# Patient Record
Sex: FEMALE | Race: OTHER | HISPANIC OR LATINO | URBAN - METROPOLITAN AREA
[De-identification: names, ages, dates, MRNs, and addresses within clinical notes are randomized per-mention and may not be internally consistent; named-entity substitution may affect disease eponyms.]

---

## 2018-07-06 ENCOUNTER — INPATIENT (INPATIENT)
Facility: HOSPITAL | Age: 73
LOS: 9 days | Discharge: HOME HEALTH | DRG: 233 | End: 2018-07-16
Attending: THORACIC SURGERY (CARDIOTHORACIC VASCULAR SURGERY) | Admitting: HOSPITALIST
Payer: MEDICAID

## 2018-07-06 VITALS — WEIGHT: 149.91 LBS

## 2018-07-06 DIAGNOSIS — I20.0 UNSTABLE ANGINA: ICD-10-CM

## 2018-07-06 LAB
ALBUMIN SERPL ELPH-MCNC: 4 G/DL — SIGNIFICANT CHANGE UP (ref 3.3–5.2)
ALP SERPL-CCNC: 107 U/L — SIGNIFICANT CHANGE UP (ref 40–120)
ALT FLD-CCNC: 7 U/L — SIGNIFICANT CHANGE UP
ANION GAP SERPL CALC-SCNC: 16 MMOL/L — SIGNIFICANT CHANGE UP (ref 5–17)
ANION GAP SERPL CALC-SCNC: 18 MMOL/L — HIGH (ref 5–17)
ANION GAP SERPL CALC-SCNC: 19 MMOL/L — HIGH (ref 5–17)
APPEARANCE UR: CLEAR — SIGNIFICANT CHANGE UP
APTT BLD: 35 SEC — SIGNIFICANT CHANGE UP (ref 27.5–37.4)
AST SERPL-CCNC: 16 U/L — SIGNIFICANT CHANGE UP
BACTERIA # UR AUTO: ABNORMAL
BASOPHILS # BLD AUTO: 0.1 K/UL — SIGNIFICANT CHANGE UP (ref 0–0.2)
BASOPHILS # BLD AUTO: 0.1 K/UL — SIGNIFICANT CHANGE UP (ref 0–0.2)
BASOPHILS NFR BLD AUTO: 0.5 % — SIGNIFICANT CHANGE UP (ref 0–2)
BASOPHILS NFR BLD AUTO: 0.6 % — SIGNIFICANT CHANGE UP (ref 0–2)
BILIRUB SERPL-MCNC: 0.5 MG/DL — SIGNIFICANT CHANGE UP (ref 0.4–2)
BILIRUB UR-MCNC: NEGATIVE — SIGNIFICANT CHANGE UP
BUN SERPL-MCNC: 10 MG/DL — SIGNIFICANT CHANGE UP (ref 8–20)
BUN SERPL-MCNC: 12 MG/DL — SIGNIFICANT CHANGE UP (ref 8–20)
BUN SERPL-MCNC: 9 MG/DL — SIGNIFICANT CHANGE UP (ref 8–20)
CALCIUM SERPL-MCNC: 9 MG/DL — SIGNIFICANT CHANGE UP (ref 8.6–10.2)
CALCIUM SERPL-MCNC: 9.3 MG/DL — SIGNIFICANT CHANGE UP (ref 8.6–10.2)
CALCIUM SERPL-MCNC: 9.4 MG/DL — SIGNIFICANT CHANGE UP (ref 8.6–10.2)
CHLORIDE SERPL-SCNC: 97 MMOL/L — LOW (ref 98–107)
CHLORIDE SERPL-SCNC: 97 MMOL/L — LOW (ref 98–107)
CHLORIDE SERPL-SCNC: 98 MMOL/L — SIGNIFICANT CHANGE UP (ref 98–107)
CHOLEST SERPL-MCNC: 244 MG/DL — HIGH (ref 110–199)
CO2 SERPL-SCNC: 24 MMOL/L — SIGNIFICANT CHANGE UP (ref 22–29)
CO2 SERPL-SCNC: 25 MMOL/L — SIGNIFICANT CHANGE UP (ref 22–29)
CO2 SERPL-SCNC: 27 MMOL/L — SIGNIFICANT CHANGE UP (ref 22–29)
COLOR SPEC: YELLOW — SIGNIFICANT CHANGE UP
CREAT SERPL-MCNC: 0.4 MG/DL — LOW (ref 0.5–1.3)
CREAT SERPL-MCNC: 0.49 MG/DL — LOW (ref 0.5–1.3)
CREAT SERPL-MCNC: 0.52 MG/DL — SIGNIFICANT CHANGE UP (ref 0.5–1.3)
DIFF PNL FLD: NEGATIVE — SIGNIFICANT CHANGE UP
EOSINOPHIL # BLD AUTO: 0.3 K/UL — SIGNIFICANT CHANGE UP (ref 0–0.5)
EOSINOPHIL # BLD AUTO: 0.4 K/UL — SIGNIFICANT CHANGE UP (ref 0–0.5)
EOSINOPHIL NFR BLD AUTO: 2.7 % — SIGNIFICANT CHANGE UP (ref 0–6)
EOSINOPHIL NFR BLD AUTO: 3 % — SIGNIFICANT CHANGE UP (ref 0–6)
EPI CELLS # UR: SIGNIFICANT CHANGE UP
GLUCOSE SERPL-MCNC: 107 MG/DL — SIGNIFICANT CHANGE UP (ref 70–115)
GLUCOSE SERPL-MCNC: 96 MG/DL — SIGNIFICANT CHANGE UP (ref 70–115)
GLUCOSE SERPL-MCNC: 99 MG/DL — SIGNIFICANT CHANGE UP (ref 70–115)
GLUCOSE UR QL: NEGATIVE MG/DL — SIGNIFICANT CHANGE UP
HBA1C BLD-MCNC: 4.8 % — SIGNIFICANT CHANGE UP (ref 4–5.6)
HCT VFR BLD CALC: 39.7 % — SIGNIFICANT CHANGE UP (ref 37–47)
HCT VFR BLD CALC: 40 % — SIGNIFICANT CHANGE UP (ref 37–47)
HCT VFR BLD CALC: 41.1 % — SIGNIFICANT CHANGE UP (ref 37–47)
HDLC SERPL-MCNC: 58 MG/DL — LOW
HGB BLD-MCNC: 13.2 G/DL — SIGNIFICANT CHANGE UP (ref 12–16)
HGB BLD-MCNC: 13.4 G/DL — SIGNIFICANT CHANGE UP (ref 12–16)
HGB BLD-MCNC: 13.6 G/DL — SIGNIFICANT CHANGE UP (ref 12–16)
INR BLD: 1.01 RATIO — SIGNIFICANT CHANGE UP (ref 0.88–1.16)
KETONES UR-MCNC: NEGATIVE — SIGNIFICANT CHANGE UP
LACTATE BLDV-MCNC: 1.4 MMOL/L — SIGNIFICANT CHANGE UP (ref 0.5–2)
LEUKOCYTE ESTERASE UR-ACNC: NEGATIVE — SIGNIFICANT CHANGE UP
LIDOCAIN IGE QN: 26 U/L — SIGNIFICANT CHANGE UP (ref 22–51)
LIPID PNL WITH DIRECT LDL SERPL: 157 MG/DL — SIGNIFICANT CHANGE UP
LYMPHOCYTES # BLD AUTO: 2.6 K/UL — SIGNIFICANT CHANGE UP (ref 1–4.8)
LYMPHOCYTES # BLD AUTO: 23.1 % — SIGNIFICANT CHANGE UP (ref 20–55)
LYMPHOCYTES # BLD AUTO: 3.4 K/UL — SIGNIFICANT CHANGE UP (ref 1–4.8)
LYMPHOCYTES # BLD AUTO: 30 % — SIGNIFICANT CHANGE UP (ref 20–55)
MAGNESIUM SERPL-MCNC: 2.2 MG/DL — SIGNIFICANT CHANGE UP (ref 1.6–2.6)
MAGNESIUM SERPL-MCNC: 2.3 MG/DL — SIGNIFICANT CHANGE UP (ref 1.8–2.6)
MCHC RBC-ENTMCNC: 30.2 PG — SIGNIFICANT CHANGE UP (ref 27–31)
MCHC RBC-ENTMCNC: 30.4 PG — SIGNIFICANT CHANGE UP (ref 27–31)
MCHC RBC-ENTMCNC: 30.9 PG — SIGNIFICANT CHANGE UP (ref 27–31)
MCHC RBC-ENTMCNC: 33.1 G/DL — SIGNIFICANT CHANGE UP (ref 32–36)
MCHC RBC-ENTMCNC: 33.2 G/DL — SIGNIFICANT CHANGE UP (ref 32–36)
MCHC RBC-ENTMCNC: 33.5 G/DL — SIGNIFICANT CHANGE UP (ref 32–36)
MCV RBC AUTO: 91.3 FL — SIGNIFICANT CHANGE UP (ref 81–99)
MCV RBC AUTO: 91.5 FL — SIGNIFICANT CHANGE UP (ref 81–99)
MCV RBC AUTO: 92.2 FL — SIGNIFICANT CHANGE UP (ref 81–99)
MONOCYTES # BLD AUTO: 0.6 K/UL — SIGNIFICANT CHANGE UP (ref 0–0.8)
MONOCYTES # BLD AUTO: 0.8 K/UL — SIGNIFICANT CHANGE UP (ref 0–0.8)
MONOCYTES NFR BLD AUTO: 5.4 % — SIGNIFICANT CHANGE UP (ref 3–10)
MONOCYTES NFR BLD AUTO: 7.3 % — SIGNIFICANT CHANGE UP (ref 3–10)
NEUTROPHILS # BLD AUTO: 7 K/UL — SIGNIFICANT CHANGE UP (ref 1.8–8)
NEUTROPHILS # BLD AUTO: 7.4 K/UL — SIGNIFICANT CHANGE UP (ref 1.8–8)
NEUTROPHILS NFR BLD AUTO: 60.6 % — SIGNIFICANT CHANGE UP (ref 37–73)
NEUTROPHILS NFR BLD AUTO: 66 % — SIGNIFICANT CHANGE UP (ref 37–73)
NITRITE UR-MCNC: NEGATIVE — SIGNIFICANT CHANGE UP
NT-PROBNP SERPL-SCNC: 175 PG/ML — SIGNIFICANT CHANGE UP (ref 0–300)
PH UR: 7 — SIGNIFICANT CHANGE UP (ref 5–8)
PLATELET # BLD AUTO: 261 K/UL — SIGNIFICANT CHANGE UP (ref 150–400)
PLATELET # BLD AUTO: 272 K/UL — SIGNIFICANT CHANGE UP (ref 150–400)
PLATELET # BLD AUTO: 277 K/UL — SIGNIFICANT CHANGE UP (ref 150–400)
POTASSIUM SERPL-MCNC: 3.2 MMOL/L — LOW (ref 3.5–5.3)
POTASSIUM SERPL-MCNC: 3.5 MMOL/L — SIGNIFICANT CHANGE UP (ref 3.5–5.3)
POTASSIUM SERPL-MCNC: 3.7 MMOL/L — SIGNIFICANT CHANGE UP (ref 3.5–5.3)
POTASSIUM SERPL-SCNC: 3.2 MMOL/L — LOW (ref 3.5–5.3)
POTASSIUM SERPL-SCNC: 3.5 MMOL/L — SIGNIFICANT CHANGE UP (ref 3.5–5.3)
POTASSIUM SERPL-SCNC: 3.7 MMOL/L — SIGNIFICANT CHANGE UP (ref 3.5–5.3)
PROT SERPL-MCNC: 7.5 G/DL — SIGNIFICANT CHANGE UP (ref 6.6–8.7)
PROT UR-MCNC: 15 MG/DL
PROTHROM AB SERPL-ACNC: 11.1 SEC — SIGNIFICANT CHANGE UP (ref 9.8–12.7)
RBC # BLD: 4.34 M/UL — LOW (ref 4.4–5.2)
RBC # BLD: 4.34 M/UL — LOW (ref 4.4–5.2)
RBC # BLD: 4.5 M/UL — SIGNIFICANT CHANGE UP (ref 4.4–5.2)
RBC # FLD: 12.3 % — SIGNIFICANT CHANGE UP (ref 11–15.6)
RBC # FLD: 12.4 % — SIGNIFICANT CHANGE UP (ref 11–15.6)
RBC # FLD: 12.5 % — SIGNIFICANT CHANGE UP (ref 11–15.6)
RBC CASTS # UR COMP ASSIST: SIGNIFICANT CHANGE UP /HPF (ref 0–4)
SODIUM SERPL-SCNC: 139 MMOL/L — SIGNIFICANT CHANGE UP (ref 135–145)
SODIUM SERPL-SCNC: 141 MMOL/L — SIGNIFICANT CHANGE UP (ref 135–145)
SODIUM SERPL-SCNC: 141 MMOL/L — SIGNIFICANT CHANGE UP (ref 135–145)
SP GR SPEC: 1.01 — SIGNIFICANT CHANGE UP (ref 1.01–1.02)
TOTAL CHOLESTEROL/HDL RATIO MEASUREMENT: 4 RATIO — SIGNIFICANT CHANGE UP (ref 3.3–7.1)
TRIGL SERPL-MCNC: 145 MG/DL — SIGNIFICANT CHANGE UP (ref 10–200)
TROPONIN T SERPL-MCNC: 0.07 NG/ML — HIGH (ref 0–0.06)
TROPONIN T SERPL-MCNC: 0.23 NG/ML — HIGH (ref 0–0.06)
TROPONIN T SERPL-MCNC: 0.34 NG/ML — HIGH (ref 0–0.06)
TSH SERPL-MCNC: 2.36 UIU/ML — SIGNIFICANT CHANGE UP (ref 0.27–4.2)
UROBILINOGEN FLD QL: NEGATIVE MG/DL — SIGNIFICANT CHANGE UP
WBC # BLD: 10 K/UL — SIGNIFICANT CHANGE UP (ref 4.8–10.8)
WBC # BLD: 11.2 K/UL — HIGH (ref 4.8–10.8)
WBC # BLD: 11.5 K/UL — HIGH (ref 4.8–10.8)
WBC # FLD AUTO: 10 K/UL — SIGNIFICANT CHANGE UP (ref 4.8–10.8)
WBC # FLD AUTO: 11.2 K/UL — HIGH (ref 4.8–10.8)
WBC # FLD AUTO: 11.5 K/UL — HIGH (ref 4.8–10.8)
WBC UR QL: SIGNIFICANT CHANGE UP

## 2018-07-06 PROCEDURE — 71260 CT THORAX DX C+: CPT | Mod: 26

## 2018-07-06 PROCEDURE — 93010 ELECTROCARDIOGRAM REPORT: CPT

## 2018-07-06 PROCEDURE — 99223 1ST HOSP IP/OBS HIGH 75: CPT

## 2018-07-06 PROCEDURE — 93458 L HRT ARTERY/VENTRICLE ANGIO: CPT | Mod: 26

## 2018-07-06 PROCEDURE — 71046 X-RAY EXAM CHEST 2 VIEWS: CPT | Mod: 26

## 2018-07-06 PROCEDURE — 99152 MOD SED SAME PHYS/QHP 5/>YRS: CPT

## 2018-07-06 PROCEDURE — 74177 CT ABD & PELVIS W/CONTRAST: CPT | Mod: 26

## 2018-07-06 PROCEDURE — 99283 EMERGENCY DEPT VISIT LOW MDM: CPT | Mod: 25

## 2018-07-06 RX ORDER — MORPHINE SULFATE 50 MG/1
2 CAPSULE, EXTENDED RELEASE ORAL EVERY 4 HOURS
Qty: 0 | Refills: 0 | Status: DISCONTINUED | OUTPATIENT
Start: 2018-07-06 | End: 2018-07-09

## 2018-07-06 RX ORDER — HYDRALAZINE HCL 50 MG
5 TABLET ORAL EVERY 6 HOURS
Qty: 0 | Refills: 0 | Status: DISCONTINUED | OUTPATIENT
Start: 2018-07-06 | End: 2018-07-11

## 2018-07-06 RX ORDER — PANTOPRAZOLE SODIUM 20 MG/1
40 TABLET, DELAYED RELEASE ORAL ONCE
Qty: 0 | Refills: 0 | Status: COMPLETED | OUTPATIENT
Start: 2018-07-06 | End: 2018-07-06

## 2018-07-06 RX ORDER — ENOXAPARIN SODIUM 100 MG/ML
40 INJECTION SUBCUTANEOUS DAILY
Qty: 0 | Refills: 0 | Status: DISCONTINUED | OUTPATIENT
Start: 2018-07-06 | End: 2018-07-10

## 2018-07-06 RX ORDER — AMLODIPINE BESYLATE 2.5 MG/1
5 TABLET ORAL DAILY
Qty: 0 | Refills: 0 | Status: DISCONTINUED | OUTPATIENT
Start: 2018-07-06 | End: 2018-07-11

## 2018-07-06 RX ORDER — ASPIRIN/CALCIUM CARB/MAGNESIUM 324 MG
325 TABLET ORAL DAILY
Qty: 0 | Refills: 0 | Status: DISCONTINUED | OUTPATIENT
Start: 2018-07-07 | End: 2018-07-10

## 2018-07-06 RX ORDER — METOCLOPRAMIDE HCL 10 MG
10 TABLET ORAL ONCE
Qty: 0 | Refills: 0 | Status: COMPLETED | OUTPATIENT
Start: 2018-07-06 | End: 2018-07-06

## 2018-07-06 RX ORDER — ASPIRIN/CALCIUM CARB/MAGNESIUM 324 MG
325 TABLET ORAL ONCE
Qty: 0 | Refills: 0 | Status: COMPLETED | OUTPATIENT
Start: 2018-07-06 | End: 2018-07-06

## 2018-07-06 RX ORDER — ACETAMINOPHEN 500 MG
650 TABLET ORAL ONCE
Qty: 0 | Refills: 0 | Status: COMPLETED | OUTPATIENT
Start: 2018-07-06 | End: 2018-07-06

## 2018-07-06 RX ORDER — IPRATROPIUM/ALBUTEROL SULFATE 18-103MCG
3 AEROSOL WITH ADAPTER (GRAM) INHALATION EVERY 6 HOURS
Qty: 0 | Refills: 0 | Status: DISCONTINUED | OUTPATIENT
Start: 2018-07-06 | End: 2018-07-08

## 2018-07-06 RX ORDER — NICOTINE POLACRILEX 2 MG
1 GUM BUCCAL DAILY
Qty: 0 | Refills: 0 | Status: DISCONTINUED | OUTPATIENT
Start: 2018-07-06 | End: 2018-07-11

## 2018-07-06 RX ORDER — NITROGLYCERIN 6.5 MG
1 CAPSULE, EXTENDED RELEASE ORAL DAILY
Qty: 0 | Refills: 0 | Status: DISCONTINUED | OUTPATIENT
Start: 2018-07-06 | End: 2018-07-11

## 2018-07-06 RX ADMIN — Medication 325 MILLIGRAM(S): at 08:10

## 2018-07-06 RX ADMIN — Medication 650 MILLIGRAM(S): at 18:46

## 2018-07-06 RX ADMIN — Medication 104 MILLIGRAM(S): at 05:12

## 2018-07-06 RX ADMIN — PANTOPRAZOLE SODIUM 40 MILLIGRAM(S): 20 TABLET, DELAYED RELEASE ORAL at 05:12

## 2018-07-06 RX ADMIN — Medication 650 MILLIGRAM(S): at 18:31

## 2018-07-06 RX ADMIN — Medication 3 MILLILITER(S): at 14:31

## 2018-07-06 RX ADMIN — Medication 1 PATCH: at 11:20

## 2018-07-06 NOTE — PROGRESS NOTE ADULT - SUBJECTIVE AND OBJECTIVE BOX
Nurse Practitioner Progress note:     INTERVAL HISTORY: 73 year old female with c/o chest pain.     MEDICATIONS:  amLODIPine   Tablet 5 milliGRAM(s) Oral daily  hydrALAZINE Injectable 5 milliGRAM(s) IV Push every 6 hours PRN  nitroglycerin    Patch 0.1 mG/Hr(s) 1 patch Transdermal daily  ALBUTerol/ipratropium for Nebulization 3 milliLiter(s) Nebulizer every 6 hours  LORazepam   Injectable 0.25 milliGRAM(s) IV Push every 8 hours PRN  morphine  - Injectable 2 milliGRAM(s) IV Push every 4 hours PRN  enoxaparin Injectable 40 milliGRAM(s) SubCutaneous daily      TELEMETRY:  SB 54 bpm    T(C): 36.6 (07-06-18 @ 16:30), Max: 37 (07-06-18 @ 01:09)  HR: 56 (07-06-18 @ 18:03) (55 - 67)  BP: 163/77 (07-06-18 @ 18:03) (141/72 - 163/77)  RR: 14 (07-06-18 @ 18:03) (14 - 20)  SpO2: 93% (07-06-18 @ 18:03) (92% - 99%)  Wt(kg): --    PHYSICAL EXAM:  Appearance: Normal	  HEENT:   Normal oral mucosa, PERRL  Cardiovascular: Normal S1 S2, No JVD, No murmurs, No edema  Respiratory: Lungs with b/l wheezilng  Psychiatry: A & O x 3, Mood & affect appropriate.  Croatian speaking  Gastrointestinal:  Soft, Non-tender, + BS	  Skin: No rashes, No ecchymoses, No cyanosis  Neurologic: Non-focal, A&O X3.  No neuro deficits  Extremities: Normal range of motion, No clubbing, cyanosis or edema  Vascular: Peripheral pulses palpable 2+ bilaterally  Procedure Site: Right radial band in place.  Site benign.  No bleeding/hematoma/ecchymosis.  + palp radial.    	      PROCEDURE RESULTS: S/P LHC which revealed: mLAD=95%, OM=60%, pRCA 100% and normal LV (full report to follow) via right radial.  No intervention.   For CTS consult    ASSESSMENT/PLAN: 	  -Radial precautions  -D/C radial band in 1 hour  -CTS consult pending  -Site check in AM  -Please call with questions/concerns  -Transfer back to telemetry

## 2018-07-06 NOTE — ED ADULT NURSE NOTE - OBJECTIVE STATEMENT
Pt received sitting on stretcher in NAD. Pt AOx3 C/o chest pain x 3 days. pt started coughing tonight. Neuro WNL. PERRLA. Lungs with BL Wheeze, RR even unlabored. Ab soft non tender, + bowel sounds x 4quads. Denies Nausea, Vomiting, Diarrhea. Skin warm, dry, color appropriate for age and race.

## 2018-07-06 NOTE — ED ADULT NURSE REASSESSMENT NOTE - NS ED NURSE REASSESS COMMENT FT1
Pt transported to Ct scan
Report given to Marybel RN and patient transported to A9. No distress present. Safety maintained at all times.
Pt received sleeping in stretcher with family at bedside, easily arousable to verbal and tactile stimluli. Alert and oriented x4 with CM in place showing NSR. No distress present and no complaints offered. Plan of care reviewed and safety maintained at all times. Deniess dizziness, CP, or SOB. Denies nausea or vomiting. RN to continue to monitor and reassess closely.

## 2018-07-06 NOTE — H&P ADULT - HISTORY OF PRESENT ILLNESS
73 yof with pmh of active smoker, htn and possible cad presents from home with 2 day history of mid sternal chest pain which is rated 6 out of 10 with radiation to left sided neck and is intermittent. Patients family was at bedside who provided much of the history. Patient states she was told she may have had a silent heart attack in the past. Patient also states the pain is reproducible. Patient dneies nausea, diaphoresis. Admission trop is 0.07.

## 2018-07-06 NOTE — ED ADULT NURSE NOTE - NSSISCREENINGQ3_ED_A_ED
Is This A New Presentation, Or A Follow-Up?: Skin Lesion
How Severe Is Your Skin Lesion?: moderate
Has Your Skin Lesion Been Treated?: not been treated
No

## 2018-07-06 NOTE — PROGRESS NOTE ADULT - SUBJECTIVE AND OBJECTIVE BOX
Cardiology NP    ASA 2  Mallampati 2    73 year old female with prior cardiac history "pre heart attacks" with c/o chest pain.  + trops. + smoker.  + wheezing.  For LHC to assess coronary arteries.

## 2018-07-06 NOTE — ED PROVIDER NOTE - PHYSICAL EXAMINATION
Constitutional : Appears uncomfortable, talking in full sentences  Head :NC AT , no swelling  Eyes :eomi, no swelling  Mouth :mm dry,   Neck : supple, trachea in midline  Chest : Diffuse wheezing. Edilberto air entry, symm chest expansion, no distress  Heart :S1 S2 distant  Abdomen :abd soft, LUQ tenderness with localized guarding   Musc/Skel :ext no swelling, no deformity, no spine tenderness, distal pulses present  Neuro  :AAO 3 no focal deficits

## 2018-07-06 NOTE — H&P ADULT - NSHPPHYSICALEXAM_GEN_ALL_CORE
PHYSICAL EXAM:    GENERAL: NAD, well-groomed, well-developed  HEAD:  Atraumatic, Normocephalic  EYES: EOMI, PERRLA,   ENMT: No tonsillar erythema, exudates, or enlargement;   NECK: Supple, No JVD, Normal thyroid  NERVOUS SYSTEM:  Alert & Oriented X3, Good concentration;   CHEST/LUNG: Clear to percussion bilaterally; No rales,  HEART: Regular rate and rhythm; No murmurs,  ABDOMEN: Soft, Nontender, Nondistended;   EXTREMITIES:  2+ Peripheral Pulses, No edema  LYMPH: No lymphadenopathy noted  SKIN: No rashes or lesions PHYSICAL EXAM:    GENERAL: NAD, speaking in full sentences  HEAD:  Atraumatic, Normocephalic  EYES: EOMI, PERRLA,   ENMT: No tonsillar erythema, exudates, or enlargement;   NECK: Supple, No JVD, Normal thyroid  NERVOUS SYSTEM:  Alert & Oriented X3, Good concentration;   CHEST/LUNG: tender on palpation   HEART: Regular rate and rhythm; No murmurs,  ABDOMEN: Soft, Nontender, Nondistended;   EXTREMITIES:  2+ Peripheral Pulses, No edema  LYMPH: No lymphadenopathy noted  SKIN: No rashes or lesions

## 2018-07-06 NOTE — H&P ADULT - ASSESSMENT
1) Atypical chest pain --> cardio consult appreciated  --> admit to tele  --> tte, stress test  --> hemoglobin a1c and lipid panel sent  --> nitro paste. morphine prn     2) NSTEMI --> plan as per #1  ---> trending upward  --> asa given  --> will start heparin drip for now     3) smoker --> patch    4) htn --> hold oral meds for now  --> iv hydrlaazine prn     5) dvt prop --> lovenox sub q    6) diet --> npo  --> dash diet after stress 1) Atypical chest pain --> cardio consult appreciated  --> admit to tele  --> tte, stress test  --> hemoglobin a1c and lipid panel sent  --> nitro paste. morphine prn     2) NSTEMI --> plan as per #1  ---> trending upward  --> asa given  --> plan as per cardio     3) smoker --> patch    4) htn --> hold oral meds for now  --> iv hydrlaazine prn     5) dvt prop --> lovenox sub q    6) diet --> npo  --> dash diet after stress

## 2018-07-06 NOTE — CONSULT NOTE ADULT - SUBJECTIVE AND OBJECTIVE BOX
HPI:  Asked to see patient in ER  who presented with Chest pain. Her Granddaughters were present - they translated and provided the History. Patient is not a good Historian.  She has had 3 days of chest pain - constant - below the left breast - sharp in nature - reproducible to pressure/touch- not worsen with exertion - without radiation and not associated SOB or palpitations. Does not change with position  Denies DM, prior MI  h/o HTN  Seen by cardiologist in [facility] - told that she has enlarge heart.  She is a smoker since age 17  + FH x CAD ?    In ER: vss. EKG: nonspecific ST-T abn.     First Trop - negative.       PAST MEDICAL & SURGICAL HISTORY:  HTN (hypertension)      PREVIOUS DIAGNOSTIC TESTING:      ECHO  FINDINGS:    STRESS  FINDINGS:    CATHETERIZATION  FINDINGS:      Allergies    latex (Unknown)  No Known Drug Allergies    Intolerances        MEDICATIONS  (STANDING):  ALBUTerol/ipratropium for Nebulization 3 milliLiter(s) Nebulizer every 6 hours  nicotine - 21 mG/24Hr(s) Patch 1 patch Transdermal daily  nitroglycerin    Patch 0.1 mG/Hr(s) 1 patch Transdermal daily    MEDICATIONS  (PRN):  hydrALAZINE Injectable 5 milliGRAM(s) IV Push every 6 hours PRN for sbp above 160 mmhg  LORazepam   Injectable 0.25 milliGRAM(s) IV Push every 8 hours PRN Agitation  morphine  - Injectable 2 milliGRAM(s) IV Push every 4 hours PRN chest pain      FAMILY HISTORY:      SOCIAL HISTORY:    CIGARETTES:  none    ALCOHOL:  none    REVIEW OF SYSTEMS:  CONSTITUTIONAL: No fever, weight loss  EYES: Novisual disturbances  ENMT:  No sinus or throat pain  NECK: No pain or stiffness  RESPIRATORY: occasional cough cough, wheezing;  no chills or hemoptysis; occasional  Shortness of Breath  CARDIOVASCULAR: No  passing out, dizziness, or leg swelling  GASTROINTESTINAL: No abdominal or epigastric pain. No nausea, vomiting, or hematemesis;  NEUROLOGICAL: No headaches  SKIN: No rashes lesions   MUSCULOSKELETAL:joint pain   PSYCHIATRIC: No depression  HEME/LYMPH: No easy bruising  Vital Signs Last 24 Hrs  T(C): 37 (2018 01:09), Max: 37 (2018 01:09)  T(F): 98.6 (2018 01:09), Max: 98.6 (2018 01:09)  HR: 56 (2018 07:38) (56 - 67)  BP: 153/70 (2018 07:38) (153/70 - 153/84)  BP(mean): 100 (2018 07:38) (100 - 100)  RR: 20 (2018 07:38) (20 - 20)  SpO2: 96% (2018 07:38) (94% - 96%)    Daily     Daily     I&O's Detail      PHYSICAL EXAM:  Appearance: Normal  HEENT:   Normal oral mucosa, PERRL, EOMI, sclera non-icteric	  Lymphatic: No cervical lymphadenopathy  Cardiovascular: Normal S1 S2, No JVD, No cardiac murmurs, No carotid bruits, No peripheral edema  Respiratory: Lungs - scattered wheezing. No rales.   Psychiatry: A & O x 3  Gastrointestinal:  Soft, Non-tender, + BS  Skin: No rashes, No ecchymoses, No cyanosis  Neurologic: Grossly non-focal   Extremities: No clubbing, cyanosis or edema  Vascular: Peripheral pulses palpable 1+ bilaterally      INTERPRETATION OF TELEMETRY:    ECG: SR, nonspecific ST- T changes.     LABS:                        13.4   10.0  )-----------( 272      ( 2018 09:19 )             40.0     07-06    141  |  98  |  9.0  ----------------------------<  96  3.7   |  27.0  |  0.49<L>    Ca    9.4      2018 09:19  Mg     2.3     07-06    TPro  7.5  /  Alb  4.0  /  TBili  0.5  /  DBili  x   /  AST  16  /  ALT  7   /  AlkPhos  107  07-06      CARDIAC MARKERS ( 2018 09:19 )  CKx     / CKMBx     / Troponin T0.23 ng/mL /  CARDIAC MARKERS ( 2018 05:10 )  CKx     / CKMBx     / Troponin T0.07 ng/mL /      PT/INR - ( 2018 05:10 )   PT: 11.1 sec;   INR: 1.01 ratio         PTT - ( 2018 05:10 )  PTT:35.0 sec  Urinalysis Basic - ( 2018 05:10 )    Color: Yellow / Appearance: Clear / S.010 / pH: x  Gluc: x / Ketone: Negative  / Bili: Negative / Urobili: Negative mg/dL   Blood: x / Protein: 15 mg/dL / Nitrite: Negative   Leuk Esterase: Negative / RBC: 0-2 /HPF / WBC 0-2   Sq Epi: x / Non Sq Epi: Occasional / Bacteria: Occasional      I&O's Summary    BNPSerum Pro-Brain Natriuretic Peptide: 175 pg/mL ( @ 05:10)    RADIOLOGY & ADDITIONAL STUDIES:

## 2018-07-06 NOTE — ED PROVIDER NOTE - OBJECTIVE STATEMENT
72 y/o female smoker with PMHx HTN presents to the ED for evaluation of LUQ abdominal pain radiating towards chest

## 2018-07-06 NOTE — H&P ADULT - NSHPLABSRESULTS_GEN_ALL_CORE
13.4   10.0   )----------(  272       ( 2018 09:19 )               40.0      141    |  98     |  9.0    ----------------------------<  96         ( 2018 09:19 )  3.7     |  27.0   |  0.49     Ca    9.4        ( 2018 09:19 )  Mg     2.3       ( 2018 09:19 )    TPro  7.5    /  Alb  4.0    /  TBili  0.5    /  DBili  x      /  AST  16     /  ALT  7      /  AlkPhos  107    ( 2018 05:10 )    LIVER FUNCTIONS - ( 2018 05:10 )  Alb: 4.0 g/dL / Pro: 7.5 g/dL / ALK PHOS: 107 U/L / ALT: 7 U/L / AST: 16 U/L / GGT: x           PT/INR -  11.1 sec / 1.01 ratio   ( 2018 05:10 )       PTT -  35.0 sec   ( 2018 05:10 )  CAPILLARY BLOOD GLUCOSE    CARDIAC MARKERS ( 2018 09:19 )  x     / 0.23 ng/mL / x     / x     / x      CARDIAC MARKERS ( 2018 05:10 )  x     / 0.07 ng/mL / x     / x     / x          Urinalysis Basic - ( 2018 05:10 )    Color: Yellow / Appearance: Clear / S.010 / pH: x  Gluc: x / Ketone: Negative  / Bili: Negative / Urobili: Negative mg/dL   Blood: x / Protein: 15 mg/dL / Nitrite: Negative   Leuk Esterase: Negative / RBC: 0-2 /HPF / WBC 0-2   Sq Epi: x / Non Sq Epi: Occasional / Bacteria: Occasional      tte - ordered    stress test --> ordered

## 2018-07-06 NOTE — CONSULT NOTE ADULT - ASSESSMENT
The chest pain appears atypical in nature - constant, sharp, reproducible with pressure/touch  Recommend echo to assess LV function and stress test to further risk stratify. The chest pain appears atypical in nature - constant, sharp, reproducible with pressure/touch  Recommend echo to assess LV function and stress test or CTA to further risk stratify.

## 2018-07-07 DIAGNOSIS — I21.4 NON-ST ELEVATION (NSTEMI) MYOCARDIAL INFARCTION: ICD-10-CM

## 2018-07-07 DIAGNOSIS — I25.110 ATHEROSCLEROTIC HEART DISEASE OF NATIVE CORONARY ARTERY WITH UNSTABLE ANGINA PECTORIS: ICD-10-CM

## 2018-07-07 LAB
ANION GAP SERPL CALC-SCNC: 18 MMOL/L — HIGH (ref 5–17)
BUN SERPL-MCNC: 14 MG/DL — SIGNIFICANT CHANGE UP (ref 8–20)
CALCIUM SERPL-MCNC: 9.1 MG/DL — SIGNIFICANT CHANGE UP (ref 8.6–10.2)
CHLORIDE SERPL-SCNC: 97 MMOL/L — LOW (ref 98–107)
CO2 SERPL-SCNC: 25 MMOL/L — SIGNIFICANT CHANGE UP (ref 22–29)
CREAT SERPL-MCNC: 0.5 MG/DL — SIGNIFICANT CHANGE UP (ref 0.5–1.3)
GLUCOSE SERPL-MCNC: 87 MG/DL — SIGNIFICANT CHANGE UP (ref 70–115)
HCT VFR BLD CALC: 38.3 % — SIGNIFICANT CHANGE UP (ref 37–47)
HGB BLD-MCNC: 12.5 G/DL — SIGNIFICANT CHANGE UP (ref 12–16)
MAGNESIUM SERPL-MCNC: 2.4 MG/DL — SIGNIFICANT CHANGE UP (ref 1.6–2.6)
MCHC RBC-ENTMCNC: 30.5 PG — SIGNIFICANT CHANGE UP (ref 27–31)
MCHC RBC-ENTMCNC: 32.6 G/DL — SIGNIFICANT CHANGE UP (ref 32–36)
MCV RBC AUTO: 93.4 FL — SIGNIFICANT CHANGE UP (ref 81–99)
MRSA PCR RESULT.: SIGNIFICANT CHANGE UP
PLATELET # BLD AUTO: 258 K/UL — SIGNIFICANT CHANGE UP (ref 150–400)
POTASSIUM SERPL-MCNC: 3.4 MMOL/L — LOW (ref 3.5–5.3)
POTASSIUM SERPL-SCNC: 3.4 MMOL/L — LOW (ref 3.5–5.3)
RBC # BLD: 4.1 M/UL — LOW (ref 4.4–5.2)
RBC # FLD: 12.6 % — SIGNIFICANT CHANGE UP (ref 11–15.6)
S AUREUS DNA NOSE QL NAA+PROBE: SIGNIFICANT CHANGE UP
SODIUM SERPL-SCNC: 140 MMOL/L — SIGNIFICANT CHANGE UP (ref 135–145)
TROPONIN T SERPL-MCNC: 0.2 NG/ML — HIGH (ref 0–0.06)
WBC # BLD: 8.5 K/UL — SIGNIFICANT CHANGE UP (ref 4.8–10.8)
WBC # FLD AUTO: 8.5 K/UL — SIGNIFICANT CHANGE UP (ref 4.8–10.8)

## 2018-07-07 PROCEDURE — 93880 EXTRACRANIAL BILAT STUDY: CPT | Mod: 26

## 2018-07-07 PROCEDURE — 99253 IP/OBS CNSLTJ NEW/EST LOW 45: CPT

## 2018-07-07 PROCEDURE — 99233 SBSQ HOSP IP/OBS HIGH 50: CPT

## 2018-07-07 RX ORDER — ACETAMINOPHEN 500 MG
650 TABLET ORAL EVERY 6 HOURS
Qty: 0 | Refills: 0 | Status: DISCONTINUED | OUTPATIENT
Start: 2018-07-07 | End: 2018-07-11

## 2018-07-07 RX ADMIN — Medication 3 MILLILITER(S): at 15:41

## 2018-07-07 RX ADMIN — AMLODIPINE BESYLATE 5 MILLIGRAM(S): 2.5 TABLET ORAL at 05:31

## 2018-07-07 RX ADMIN — Medication 3 MILLILITER(S): at 20:58

## 2018-07-07 RX ADMIN — ENOXAPARIN SODIUM 40 MILLIGRAM(S): 100 INJECTION SUBCUTANEOUS at 20:50

## 2018-07-07 RX ADMIN — Medication 3 MILLILITER(S): at 10:09

## 2018-07-07 RX ADMIN — Medication 1 PATCH: at 12:28

## 2018-07-07 RX ADMIN — Medication 325 MILLIGRAM(S): at 12:28

## 2018-07-07 RX ADMIN — Medication 1 PATCH: at 12:29

## 2018-07-07 NOTE — PROGRESS NOTE ADULT - SUBJECTIVE AND OBJECTIVE BOX
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 Patient seen at bedside and complains of headache     REVIEW OF SYSTEMS:    CONSTITUTIONAL: No fever, weight loss, or fatigue  RESPIRATORY: No cough, wheezing, hemoptysis; No shortness of breath  CARDIOVASCULAR: No chest pain, palpitations  GASTROINTESTINAL: No abdominal or epigastric pain. No nausea, vomiting  NEUROLOGICAL: headache  MISCELLANEOUS:      Vital Signs Last 24 Hrs  T(C): 36.7 (2018 10:43), Max: 36.9 (2018 15:28)  T(F): 98 (2018 10:43), Max: 98.5 (2018 15:28)  HR: 64 (2018 10:43) (47 - 64)  BP: 112/52 (2018 10:43) (112/52 - 163/77)  BP(mean): --  RR: 18 (2018 10:43) (14 - 19)  SpO2: 99% (2018 10:43) (92% - 99%)    PHYSICAL EXAM:    	GENERAL: NAD, speaking in full sentences  	EYES: EOMI, PERRLA,   	ENMT: No tonsillar erythema, exudates, or enlargement;   	NECK: Supple, No JVD, Normal thyroid  	NERVOUS SYSTEM:  Alert & Oriented X3, Good concentration;   	CHEST/LUNG: tender on palpation   	HEART: Regular rate and rhythm; No murmurs,  	ABDOMEN: Soft, Nontender, Nondistended;   	EXTREMITIES:  2+ Peripheral Pulses, No edema  	LYMPH: No lymphadenopathy noted  SKIN: No rashes or lesions    LABS:                        12.5   8.5   )-----------( 258      ( 2018 09:53 )             38.3         140  |  97<L>  |  14.0  ----------------------------<  87  3.4<L>   |  25.0  |  0.50    Ca    9.1      2018 06:05  Mg     2.4         TPro  7.5  /  Alb  4.0  /  TBili  0.5  /  DBili  x   /  AST  16  /  ALT  7   /  AlkPhos  107      PT/INR - ( 2018 05:10 )   PT: 11.1 sec;   INR: 1.01 ratio         PTT - ( 2018 05:10 )  PTT:35.0 sec  Urinalysis Basic - ( 2018 05:10 )    Color: Yellow / Appearance: Clear / S.010 / pH: x  Gluc: x / Ketone: Negative  / Bili: Negative / Urobili: Negative mg/dL   Blood: x / Protein: 15 mg/dL / Nitrite: Negative   Leuk Esterase: Negative / RBC: 0-2 /HPF / WBC 0-2   Sq Epi: x / Non Sq Epi: Occasional / Bacteria: Occasional          MEDICATIONS  (STANDING):  ALBUTerol/ipratropium for Nebulization 3 milliLiter(s) Nebulizer every 6 hours  amLODIPine   Tablet 5 milliGRAM(s) Oral daily  aspirin 325 milliGRAM(s) Oral daily  enoxaparin Injectable 40 milliGRAM(s) SubCutaneous daily  nicotine - 21 mG/24Hr(s) Patch 1 patch Transdermal daily  nitroglycerin    Patch 0.1 mG/Hr(s) 1 patch Transdermal daily    MEDICATIONS  (PRN):  hydrALAZINE Injectable 5 milliGRAM(s) IV Push every 6 hours PRN for sbp above 160 mmhg  LORazepam   Injectable 0.25 milliGRAM(s) IV Push every 8 hours PRN Agitation  morphine  - Injectable 2 milliGRAM(s) IV Push every 4 hours PRN chest pain      RADIOLOGY & ADDITIONAL TESTS:

## 2018-07-07 NOTE — CONSULT NOTE ADULT - PROBLEM SELECTOR RECOMMENDATION 9
recommend CABG   pt unsure if she wants  will preop in the meantime  TTE/carotids/PFTs  MRSA/MSSA  UA neg  type and screen x2  if amenable to surgery, will likely proceed Tues/Wed  will need to trial a beta blocker recommend CABG   pt unsure if she wants  will preop in the meantime  TTE/carotids/PFTs  MRSA/MSSA  UA neg  type and screen x2  if amenable to surgery, will likely proceed Tues/Wed  will need to trial a beta blocker if HR will tolerate (SB 50s at baseline)

## 2018-07-07 NOTE — PROGRESS NOTE ADULT - ASSESSMENT
1) multivessel disease-> ct surgery consult appreciated  --> plan as per ct surgery  --> patient is s.p cath yesterday     2) NSTEMI --> plan as per #1  ---> plan as per cardio ad ct surgery     3) smoker --> patch  --> advised to quit     4) htn --> stable    5) headache --> tylenol prn

## 2018-07-07 NOTE — PATIENT PROFILE ADULT. - LANGUAGE ASSISTANCE NEEDED
RN able to communicate in Tajik with patient./No-Patient/Caregiver offered and refused free interpretation services.

## 2018-07-07 NOTE — CONSULT NOTE ADULT - SUBJECTIVE AND OBJECTIVE BOX
Surgeon:    Consult requesting by:    HISTORY OF PRESENT ILLNESS:  73y Female    PAST MEDICAL & SURGICAL HISTORY:  HTN (hypertension)  No significant past surgical history      MEDICATIONS  (STANDING):  ALBUTerol/ipratropium for Nebulization 3 milliLiter(s) Nebulizer every 6 hours  amLODIPine   Tablet 5 milliGRAM(s) Oral daily  aspirin 325 milliGRAM(s) Oral daily  enoxaparin Injectable 40 milliGRAM(s) SubCutaneous daily  nicotine - 21 mG/24Hr(s) Patch 1 patch Transdermal daily  nitroglycerin    Patch 0.1 mG/Hr(s) 1 patch Transdermal daily    MEDICATIONS  (PRN):  hydrALAZINE Injectable 5 milliGRAM(s) IV Push every 6 hours PRN for sbp above 160 mmhg  LORazepam   Injectable 0.25 milliGRAM(s) IV Push every 8 hours PRN Agitation  morphine  - Injectable 2 milliGRAM(s) IV Push every 4 hours PRN chest pain    Antiplatelet therapy:                           Last dose/amt:    Allergies    latex (Unknown)  No Known Drug Allergies    Intolerances        SOCIAL HISTORY:  Smoker: [ ] Yes  [ ] No        PACK YEARS:                         WHEN QUIT?  ETOH use: [ ] Yes  [ ] No              FREQUENCY / QUANTITY:  Ilicit Drug use:  [ ] Yes  [ ] No  Occupation:  Live with:  Assisted device use:    FAMILY HISTORY:  No pertinent family history in first degree relatives      Review of Systems  CONSTITUTIONAL:  Fevers[ ] chills[ ] sweats[ ] fatigue[ ] weight loss[ ] weight gain [ ]                                     NEGATIVE [ ]   NEURO:  parathesias[ ] seizures [ ]  syncope [ ]  confusion [ ]                                                                                NEGATIVE[ ]   EYES: glasses[ ]  blurry vision[ ]  discharge[ ] pain[ ] glaucoma [ ]                                                                          NEGATIVE[ ]   ENMT:  difficulty hearing [ ]  vertigo[ ]  dysphagia[ ] epistaxis[ ] recent dental work [ ]                                    NEGATIVE[ ]   CV:  chest pain[ ] palpitations[ ] LAYNE [ ] diaphoresis [ ]                                                                                           NEGATIVE[ ]   RESPIRATORY:  wheezing[ ] SOB[ ] cough [ ] sputum[ ] hemoptysis[ ]                                                                  NEGATIVE[ ]   GI:  nausea[ ]  vommiting [ ]  diarrhea[ ] constipation [ ] melena [ ]                                                                      NEGATIVE[ ]   : hematuria[ ]  dysuria[ ] urgency[ ] incontinence[ ]                                                                                            NEGATIVE[ ]   MUSKULOSKELETAL:  arthritis[ ]  joint swelling [ ] muscle weakness [ ]                                                                NEGATIVE[ ]   SKIN/BREAST:  rash[ ] itching [ ]  hair loss[ ] masses[ ]                                                                                              NEGATIVE[ ]   PSYCH:  dementia [ ] depresion [ ] anxiety[ ]                                                                                                               NEGATIVE[ ]   HEME/LYMPH:  bruises easily[ ] enlarged lymph nodes[ ] tender lymph nodes[ ]                                               NEGATIVE[ ]   ENDOCRINE:  cold intolerance[ ] heat intolerance[ ] polydipsia[ ]                                                                          NEGATIVE[ ]     PHYSICAL EXAM  Vital Signs Last 24 Hrs  T(C): 36.7 (2018 10:43), Max: 36.9 (2018 15:28)  T(F): 98 (2018 10:43), Max: 98.5 (2018 15:28)  HR: 64 (2018 10:43) (47 - 64)  BP: 112/52 (2018 10:43) (112/52 - 163/77)  BP(mean): --  RR: 18 (2018 10:43) (14 - 19)  SpO2: 99% (2018 10:43) (92% - 99%)    CONSTITUTIONAL:                                                                          WNL[ ]   Neuro: WNL[ ] Normal exam oriented to person/place & time with no focal motor or sensory  deficits. Other __________                    Eyes: WNL[ ]   Normal exam of conjunctiva & lids, pupils equally reactive. Other______________________________     ENT: WNL[ ]    Normal exam of nasal/oral mucosa with absence of cyanosis. Other_____________________________  Neck: WNL[ ]  Normal exam of jugular veins, trachea & thyroid. Other_________________________________________  Chest: WNL[ ] Normal lung exam with good air movement absence of wheezes, rales, or rhonchi: Other_________________________________________                                                                                CV:  Auscultation: normal [ ] S3[ ] S4[ ] Irregular [ ] Rub[ ] Clicks[ ]    Murmurs none:[ ]systolic [ ]  diastolic [ ] holosystolic [ ]  Carotids: No Bruits[ ] Other____________ Abdominal Aorta: normal [ ] nonpalpable[ ]Other___________                                                                                      GI: WNL[ ] Normal exam of abdomen, liver & spleen with no noted masses or tenderness. Other______________________                                                                                                        Extremities: WNL[ ] Normal no evidence of cyanosis or deformity Edema: none[ ]trace[ ]1+[ ]2+[ ]3+[ ]4+[ ]  Lower Extremity Pulses: Right[ ] Left[ ]Varicosities[ ]  SKIN :WNL[ ] Normal exam to inspection & palation. Other:____________________                                                          LABS:                        12.5   8.5   )-----------( 258      ( 2018 09:53 )             38.3     07-07    140  |  97<L>  |  14.0  ----------------------------<  87  3.4<L>   |  25.0  |  0.50    Ca    9.1      2018 06:05  Mg     2.4     07-07    TPro  7.5  /  Alb  4.0  /  TBili  0.5  /  DBili  x   /  AST  16  /  ALT  7   /  AlkPhos  107  07-06    PT/INR - ( 2018 05:10 )   PT: 11.1 sec;   INR: 1.01 ratio         PTT - ( 2018 05:10 )  PTT:35.0 sec  Urinalysis Basic - ( 2018 05:10 )    Color: Yellow / Appearance: Clear / S.010 / pH: x  Gluc: x / Ketone: Negative  / Bili: Negative / Urobili: Negative mg/dL   Blood: x / Protein: 15 mg/dL / Nitrite: Negative   Leuk Esterase: Negative / RBC: 0-2 /HPF / WBC 0-2   Sq Epi: x / Non Sq Epi: Occasional / Bacteria: Occasional    CARDIAC MARKERS ( 2018 06:05 )  x     / 0.20 ng/mL / x     / x     / x      CARDIAC MARKERS ( 2018 15:34 )  x     / 0.34 ng/mL / x     / x     / x      CARDIAC MARKERS ( 2018 09:19 )  x     / 0.23 ng/mL / x     / x     / x      CARDIAC MARKERS ( 2018 05:10 )  x     / 0.07 ng/mL / x     / x     / x      Serum Pro-Brain Natriuretic Peptide: 175 pg/mL (18 @ 05:10)  Thyroid Stimulating Hormone, Serum: 2.36 uIU/mL (18 @ 05:10)    Cardiac Cath:    TTE / VANESSA:      Assessment:          Plan: Surgeon: Kaleb    Consult requesting by: Dr. Perez    HISTORY OF PRESENT ILLNESS:  73y F, pmhx HTN, p/w 2 day h/o intermittent CP under L breast, rule in for NSTEMI, cardiac cath yesterday with triple vessel disease. CT surgery asked to eval. Pt reports pain started morning of  while making her bed. Minimal radiation across anterior chest. States she took some advil and ASA initially with some relief.  Pt also admits to SOB and feeling fatigue with one flight of stairs. Family reports she does not go out much because she is easily fatigued.     PAST MEDICAL & SURGICAL HISTORY:  HTN (hypertension)  No significant past surgical history    MEDICATIONS  (STANDING):  ALBUTerol/ipratropium for Nebulization 3 milliLiter(s) Nebulizer every 6 hours  amLODIPine   Tablet 5 milliGRAM(s) Oral daily  aspirin 325 milliGRAM(s) Oral daily  enoxaparin Injectable 40 milliGRAM(s) SubCutaneous daily  nicotine - 21 mG/24Hr(s) Patch 1 patch Transdermal daily  nitroglycerin    Patch 0.1 mG/Hr(s) 1 patch Transdermal daily    MEDICATIONS  (PRN):  hydrALAZINE Injectable 5 milliGRAM(s) IV Push every 6 hours PRN for sbp above 160 mmhg  LORazepam   Injectable 0.25 milliGRAM(s) IV Push every 8 hours PRN Agitation  morphine  - Injectable 2 milliGRAM(s) IV Push every 4 hours PRN chest pain    Antiplatelet therapy:    Asa 325 daily      Last dose/amt:    Allergies  latex (Unknown)  No Known Drug Allergies    Intolerances    SOCIAL HISTORY:  Smoker: 1/2 PPD x 55 years  ETOH use: denies  Ilicit Drug use:  denies  Occupation:  Live with: resident of [facility], current visiting and staying with daughter in 2 story house  Assisted device use: denies    FAMILY HISTORY:  No pertinent family history in first degree relatives      Review of Systems  CONSTITUTIONAL: DENIES  Fevers[ ] chills[ ] sweats[ ] fatigue[ ] weight loss[ ] weight gain [ ]                                      NEURO:  DENIES parathesias[ ] seizures [ ]  syncope [ ]  confusion [ ]                                                                               EYES: DENIES glasses[ ]  blurry vision[ ]  discharge[ ] pain[ ] glaucoma [ ]                                                                       ENMT: DENIES  difficulty hearing [ ]  vertigo[ ]  dysphagia[ ] epistaxis[ ] recent dental work [ ]                                     CV:  chest pain[X ] (none current) palpitations[ ] LAYNE [ ] diaphoresis [ ]                                                                                         RESPIRATORY:  wheezing[ ] SOB[X ] (one flight of stairs) cough [ ] sputum[ ] hemoptysis[ ]                                                                    GI:  DENIES nausea[ ]  vomiting [ ]  diarrhea[ ] constipation [ ] melena [ ]                                                                        : DENIES hematuria[ ]  dysuria[ ] urgency[ ] incontinence[ ]                                                                                            MUSKULOSKELETAL:  DENIES  arthritis[ ]  joint swelling [ ] muscle weakness [ ]                                                                  SKIN/BREAST:  DENIES rash[ ] itching [ ]  hair loss[ ] masses[ ]                                                                                             PSYCH: DENIES   dementia [ ] depression [ ] anxiety[ ]                                                                                                                 HEME/LYMPH: DENIES  bruises easily[ ] enlarged lymph nodes[ ] tender lymph nodes[ ]                                                ENDOCRINE:  DENIES cold intolerance[ ] heat intolerance[ ] polydipsia[ ]                                                                             PHYSICAL EXAM  Vital Signs Last 24 Hrs  T(C): 36.7 (2018 10:43), Max: 36.9 (2018 15:28)  T(F): 98 (2018 10:43), Max: 98.5 (2018 15:28)  HR: 64 (2018 10:43) (47 - 64)  BP: 112/52 (2018 10:43) (112/52 - 163/77)  BP(mean): --  RR: 18 (2018 10:43) (14 - 19)  SpO2: 99% (2018 10:43) (92% - 99%)    CONSTITUTIONAL: WNL[X]   Neuro: WNL[X ] Normal exam oriented to person/place & time with no focal motor or sensory  deficits. Other __________                    Eyes: WNL[X ]   Normal exam of conjunctiva & lids, pupils equally reactive. Other______________________________     ENT: WNL[ X]    Normal exam of nasal/oral mucosa with absence of cyanosis. Other_____________________________  Neck: WNL[ X]  Normal exam of jugular veins, trachea & thyroid. Other_________________________________________  Chest: WNL[ ] Normal lung exam with good air movement absence of wheezes, rales, or rhonchi: Other mild expiratory wheeze                                                                            CV:  Auscultation: normal [ ] S3[ ] S4[ ] Irregular [ ] Rub[ ] Clicks[ ]    Murmurs none:[X ]systolic [ ]  diastolic [ ] holosystolic [ ]  Carotids: No Bruits[X] Other____________ Abdominal Aorta: normal [ ] nonpalpable[ ]Other___________                                                                                      GI: WNL[X] Normal exam of abdomen, liver & spleen with no noted masses or tenderness. Other______________________                                                                                                        Extremities: WNL[ X] Normal no evidence of cyanosis or deformity Edema: none[X ]trace[ ]1+[ ]2+[ ]3+[ ]4+[ ]  Vascular: 2+ DP and radial pulses b/l Varicosities[X ]  SKIN :WNL[X] Normal exam to inspection & palpation. Other:____________________                                                          LABS:                        12.5   8.5   )-----------( 258      ( 2018 09:53 )             38.3     07-07    140  |  97<L>  |  14.0  ----------------------------<  87  3.4<L>   |  25.0  |  0.50    Ca    9.1      2018 06:05  Mg     2.4     07-07    TPro  7.5  /  Alb  4.0  /  TBili  0.5  /  DBili  x   /  AST  16  /  ALT  7   /  AlkPhos  107  07-06    PT/INR - ( 2018 05:10 )   PT: 11.1 sec;   INR: 1.01 ratio         PTT - ( 2018 05:10 )  PTT:35.0 sec  Urinalysis Basic - ( 2018 05:10 )    Color: Yellow / Appearance: Clear / S.010 / pH: x  Gluc: x / Ketone: Negative  / Bili: Negative / Urobili: Negative mg/dL   Blood: x / Protein: 15 mg/dL / Nitrite: Negative   Leuk Esterase: Negative / RBC: 0-2 /HPF / WBC 0-2   Sq Epi: x / Non Sq Epi: Occasional / Bacteria: Occasional    CARDIAC MARKERS ( 2018 06:05 )  x     / 0.20 ng/mL / x     / x     / x      CARDIAC MARKERS ( 2018 15:34 )  x     / 0.34 ng/mL / x     / x     / x      CARDIAC MARKERS ( 2018 09:19 )  x     / 0.23 ng/mL / x     / x     / x      CARDIAC MARKERS ( 2018 05:10 )  x     / 0.07 ng/mL / x     / x     / x      Serum Pro-Brain Natriuretic Peptide: 175 pg/mL (18 @ 05:10)  Thyroid Stimulating Hormone, Serum: 2.36 uIU/mL (18 @ 05:10)

## 2018-07-07 NOTE — PROGRESS NOTE ADULT - SUBJECTIVE AND OBJECTIVE BOX
REASON FOR VISIT:  Post cath check    EVENTS PAST 24 HOURS:  Denies any cp  right radial band dressing removed  strong pulse good capillary refill to nail beds  + desaturation on monitor    ROS:  CV:  No cp, sob or palpitations  Resp:  No cough, wheeze, mucus production  GI:  no abd pain, nausea, melana  :  no hematuria no dysuria  SKIN: no rashes, hematomas      Home Medications:  bisoprolol 5 mg oral tablet: 1 tab(s) orally once a day (2018 07:40)  Norvasc 5 mg oral tablet: 1 tab(s) orally once a day (2018 07:40)    ALBUTerol/ipratropium for Nebulization 3 milliLiter(s) Nebulizer every 6 hours  amLODIPine   Tablet 5 milliGRAM(s) Oral daily  aspirin 325 milliGRAM(s) Oral daily  enoxaparin Injectable 40 milliGRAM(s) SubCutaneous daily  hydrALAZINE Injectable 5 milliGRAM(s) IV Push every 6 hours PRN  LORazepam   Injectable 0.25 milliGRAM(s) IV Push every 8 hours PRN  morphine  - Injectable 2 milliGRAM(s) IV Push every 4 hours PRN  nicotine - 21 mG/24Hr(s) Patch 1 patch Transdermal daily  nitroglycerin    Patch 0.1 mG/Hr(s) 1 patch Transdermal daily    I&O's Summary    Daily     Daily Weight in k (2018 05:26)    LABS  7-6-18    CBC Full  -  ( 2018 17:51 )  WBC Count : 11.2 K/uL  Hemoglobin : 13.6 g/dL  Hematocrit : 41.1 %  Platelet Count - Automated : 277 K/uL  Mean Cell Volume : 91.3 fl  Mean Cell Hemoglobin : 30.2 pg  Mean Cell Hemoglobin Concentration : 33.1 g/dL      141  |  97<L>  |  12.0  ----------------------------<  107    3.2<L>   |  25.0  |  0.52    Ca    9.3      2018 15:34  Mg     2.3     -    TPro  7.5  /  Alb  4.0  /  TBili  0.5  /  DBili  x   /  AST  16  /  ALT  7   /  AlkPhos  107  07-06    CARDIAC MARKERS ( 2018 06:05 )  x     / 0.20 ng/mL / x     / x     / x      CARDIAC MARKERS ( 2018 15:34 )  x     / 0.34 ng/mL / x     / x     / x      CARDIAC MARKERS ( 2018 09:19 )  x     / 0.23 ng/mL / x     / x     / x      CARDIAC MARKERS ( 2018 05:10 )  x     / 0.07 ng/mL / x     / x     / x            TELEMETRY:  Sinus rhythm  PE Exam  NECK supple no jvd  Lungs clear to auscultation  Heart s1&s2   Abd soft active bowel sounds non tender  Ext  right radial pulse +    Neuro alert and oriented no focal findings      Full cath report pending  verbal report  S/P LHC which revealed: mLAD=95%, OM=60%, pRCA 100% and normal LV (full report to follow) via right radial.  No intervention.   For CTS consult    Echo Pending  Carotids pending  PFT pending     CAD  asa REASON FOR VISIT:  Post cath check    EVENTS PAST 24 HOURS:  Denies any cp  right radial band dressing removed  strong pulse good capillary refill to nail beds  + desaturation on monitor    ROS:  CV:  No cp, sob or palpitations  Resp:  No cough, wheeze, mucus production  GI:  no abd pain, nausea, melana  :  no hematuria no dysuria  SKIN: no rashes, hematomas      Home Medications:  bisoprolol 5 mg oral tablet: 1 tab(s) orally once a day (2018 07:40)  Norvasc 5 mg oral tablet: 1 tab(s) orally once a day (2018 07:40)    ALBUTerol/ipratropium for Nebulization 3 milliLiter(s) Nebulizer every 6 hours  amLODIPine   Tablet 5 milliGRAM(s) Oral daily  aspirin 325 milliGRAM(s) Oral daily  enoxaparin Injectable 40 milliGRAM(s) SubCutaneous daily  hydrALAZINE Injectable 5 milliGRAM(s) IV Push every 6 hours PRN  LORazepam   Injectable 0.25 milliGRAM(s) IV Push every 8 hours PRN  morphine  - Injectable 2 milliGRAM(s) IV Push every 4 hours PRN  nicotine - 21 mG/24Hr(s) Patch 1 patch Transdermal daily  nitroglycerin    Patch 0.1 mG/Hr(s) 1 patch Transdermal daily    I&O's Summary    Daily     Daily Weight in k (2018 05:26)    LABS  7-6-18    CBC Full  -  ( 2018 17:51 )  WBC Count : 11.2 K/uL  Hemoglobin : 13.6 g/dL  Hematocrit : 41.1 %  Platelet Count - Automated : 277 K/uL  Mean Cell Volume : 91.3 fl  Mean Cell Hemoglobin : 30.2 pg  Mean Cell Hemoglobin Concentration : 33.1 g/dL      141  |  97<L>  |  12.0  ----------------------------<  107    3.2<L>   |  25.0  |  0.52    Ca    9.3      2018 15:34  Mg     2.3     -    TPro  7.5  /  Alb  4.0  /  TBili  0.5  /  DBili  x   /  AST  16  /  ALT  7   /  AlkPhos  107  07-06    CARDIAC MARKERS ( 2018 06:05 )  x     / 0.20 ng/mL / x     / x     / x      CARDIAC MARKERS ( 2018 15:34 )  x     / 0.34 ng/mL / x     / x     / x      CARDIAC MARKERS ( 2018 09:19 )  x     / 0.23 ng/mL / x     / x     / x      CARDIAC MARKERS ( 2018 05:10 )  x     / 0.07 ng/mL / x     / x     / x          TELEMETRY:  Sinus rhythm, sinus josefa  desaturations  PE Exam  NECK supple no jvd  Lungs clear to auscultation  Heart s1&s2   Abd soft active bowel sounds non tender  Ext  right radial pulse +    Neuro alert and oriented no focal findings      Full cath report pending  verbal report  S/P LHC which revealed: m LAD=95%, OM=60%, pRCA 100% and normal LV (full report to follow) via right radial.  No intervention.   For CTS consult    Echo Pending  Carotids pending  PFT pending     CAD  Multi vessel  Evaluated By CV  for cabg  asa  hr slow without beta blocker  Surgical workup in progress  Discussed with granddaughter at bedside\  Repeat labs today REASON FOR VISIT:  Post cath check    EVENTS PAST 24 HOURS:  Denies any cp  right radial band dressing removed  strong pulse good capillary refill to nail beds  + desaturation on monitor    ROS:  CV:  No cp, sob or palpitations  Resp:  No cough, wheeze, mucus production  GI:  no abd pain, nausea, melana  :  no hematuria no dysuria  SKIN: no rashes, hematomas      Home Medications:  bisoprolol 5 mg oral tablet: 1 tab(s) orally once a day (2018 07:40)  Norvasc 5 mg oral tablet: 1 tab(s) orally once a day (2018 07:40)    ALBUTerol/ipratropium for Nebulization 3 milliLiter(s) Nebulizer every 6 hours  amLODIPine   Tablet 5 milliGRAM(s) Oral daily  aspirin 325 milliGRAM(s) Oral daily  enoxaparin Injectable 40 milliGRAM(s) SubCutaneous daily  hydrALAZINE Injectable 5 milliGRAM(s) IV Push every 6 hours PRN  LORazepam   Injectable 0.25 milliGRAM(s) IV Push every 8 hours PRN  morphine  - Injectable 2 milliGRAM(s) IV Push every 4 hours PRN  nicotine - 21 mG/24Hr(s) Patch 1 patch Transdermal daily  nitroglycerin    Patch 0.1 mG/Hr(s) 1 patch Transdermal daily    I&O's Summary    Daily     Daily Weight in k (2018 05:26)    LABS  7-6-18    CBC Full  -  ( 2018 17:51 )  WBC Count : 11.2 K/uL  Hemoglobin : 13.6 g/dL  Hematocrit : 41.1 %  Platelet Count - Automated : 277 K/uL  Mean Cell Volume : 91.3 fl  Mean Cell Hemoglobin : 30.2 pg  Mean Cell Hemoglobin Concentration : 33.1 g/dL      141  |  97<L>  |  12.0  ----------------------------<  107    3.2<L>   |  25.0  |  0.52    Ca    9.3      2018 15:34  Mg     2.3     -    TPro  7.5  /  Alb  4.0  /  TBili  0.5  /  DBili  x   /  AST  16  /  ALT  7   /  AlkPhos  107  07-06    CARDIAC MARKERS ( 2018 06:05 )  x     / 0.20 ng/mL / x     / x     / x      CARDIAC MARKERS ( 2018 15:34 )  x     / 0.34 ng/mL / x     / x     / x      CARDIAC MARKERS ( 2018 09:19 )  x     / 0.23 ng/mL / x     / x     / x      CARDIAC MARKERS ( 2018 05:10 )  x     / 0.07 ng/mL / x     / x     / x          TELEMETRY:  Sinus rhythm, sinus josefa  desaturations  PE Exam  NECK supple no jvd  Lungs clear to auscultation  Heart s1&s2   Abd soft active bowel sounds non tender  Ext  right radial pulse +    Neuro alert and oriented no focal findings      Full cath report pending  verbal report  S/P LHC which revealed: m LAD=95%, OM=60%, pRCA 100% and normal LV (full report to follow) via right radial.  No intervention.   For CTS consult    Echo Pending  Carotids pending  PFT pending     CAD  Multi vessel  NSTEMI  Peak trop 0.34  Evaluated By CV  for cabg  asa  hr slow without beta blocker  Surgical workup in progress  Discussed with granddaughter at bedside\  Repeat labs today

## 2018-07-08 DIAGNOSIS — F17.200 NICOTINE DEPENDENCE, UNSPECIFIED, UNCOMPLICATED: ICD-10-CM

## 2018-07-08 LAB
ABO RH CONFIRMATION: SIGNIFICANT CHANGE UP
ALBUMIN SERPL ELPH-MCNC: 4.1 G/DL — SIGNIFICANT CHANGE UP (ref 3.3–5.2)
ALP SERPL-CCNC: 101 U/L — SIGNIFICANT CHANGE UP (ref 40–120)
ALT FLD-CCNC: 8 U/L — SIGNIFICANT CHANGE UP
ANION GAP SERPL CALC-SCNC: 18 MMOL/L — HIGH (ref 5–17)
APTT BLD: 37.1 SEC — SIGNIFICANT CHANGE UP (ref 27.5–37.4)
AST SERPL-CCNC: 12 U/L — SIGNIFICANT CHANGE UP
BILIRUB DIRECT SERPL-MCNC: 0.1 MG/DL — SIGNIFICANT CHANGE UP (ref 0–0.3)
BILIRUB INDIRECT FLD-MCNC: 0.5 MG/DL — SIGNIFICANT CHANGE UP (ref 0.2–1)
BILIRUB SERPL-MCNC: 0.6 MG/DL — SIGNIFICANT CHANGE UP (ref 0.4–2)
BLD GP AB SCN SERPL QL: SIGNIFICANT CHANGE UP
BUN SERPL-MCNC: 12 MG/DL — SIGNIFICANT CHANGE UP (ref 8–20)
CALCIUM SERPL-MCNC: 9.5 MG/DL — SIGNIFICANT CHANGE UP (ref 8.6–10.2)
CHLORIDE SERPL-SCNC: 99 MMOL/L — SIGNIFICANT CHANGE UP (ref 98–107)
CO2 SERPL-SCNC: 25 MMOL/L — SIGNIFICANT CHANGE UP (ref 22–29)
CREAT SERPL-MCNC: 0.55 MG/DL — SIGNIFICANT CHANGE UP (ref 0.5–1.3)
GLUCOSE SERPL-MCNC: 108 MG/DL — SIGNIFICANT CHANGE UP (ref 70–115)
HCT VFR BLD CALC: 38.9 % — SIGNIFICANT CHANGE UP (ref 37–47)
HGB BLD-MCNC: 12.8 G/DL — SIGNIFICANT CHANGE UP (ref 12–16)
INR BLD: 1.05 RATIO — SIGNIFICANT CHANGE UP (ref 0.88–1.16)
MAGNESIUM SERPL-MCNC: 2.3 MG/DL — SIGNIFICANT CHANGE UP (ref 1.6–2.6)
MCHC RBC-ENTMCNC: 30.5 PG — SIGNIFICANT CHANGE UP (ref 27–31)
MCHC RBC-ENTMCNC: 32.9 G/DL — SIGNIFICANT CHANGE UP (ref 32–36)
MCV RBC AUTO: 92.6 FL — SIGNIFICANT CHANGE UP (ref 81–99)
PHOSPHATE SERPL-MCNC: 4 MG/DL — SIGNIFICANT CHANGE UP (ref 2.4–4.7)
PLATELET # BLD AUTO: 264 K/UL — SIGNIFICANT CHANGE UP (ref 150–400)
POTASSIUM SERPL-MCNC: 4 MMOL/L — SIGNIFICANT CHANGE UP (ref 3.5–5.3)
POTASSIUM SERPL-SCNC: 4 MMOL/L — SIGNIFICANT CHANGE UP (ref 3.5–5.3)
PREALB SERPL-MCNC: 23 MG/DL — SIGNIFICANT CHANGE UP (ref 18–38)
PROT SERPL-MCNC: 7.4 G/DL — SIGNIFICANT CHANGE UP (ref 6.6–8.7)
PROTHROM AB SERPL-ACNC: 11.6 SEC — SIGNIFICANT CHANGE UP (ref 9.8–12.7)
RBC # BLD: 4.2 M/UL — LOW (ref 4.4–5.2)
RBC # FLD: 12.5 % — SIGNIFICANT CHANGE UP (ref 11–15.6)
SODIUM SERPL-SCNC: 142 MMOL/L — SIGNIFICANT CHANGE UP (ref 135–145)
T4 AB SER-ACNC: 7.4 UG/DL — SIGNIFICANT CHANGE UP (ref 4.5–12)
TYPE + AB SCN PNL BLD: SIGNIFICANT CHANGE UP
WBC # BLD: 10 K/UL — SIGNIFICANT CHANGE UP (ref 4.8–10.8)
WBC # FLD AUTO: 10 K/UL — SIGNIFICANT CHANGE UP (ref 4.8–10.8)

## 2018-07-08 PROCEDURE — 99232 SBSQ HOSP IP/OBS MODERATE 35: CPT

## 2018-07-08 PROCEDURE — 99223 1ST HOSP IP/OBS HIGH 75: CPT

## 2018-07-08 PROCEDURE — 99233 SBSQ HOSP IP/OBS HIGH 50: CPT

## 2018-07-08 RX ORDER — CHLORHEXIDINE GLUCONATE 213 G/1000ML
1 SOLUTION TOPICAL
Qty: 0 | Refills: 0 | Status: DISCONTINUED | OUTPATIENT
Start: 2018-07-09 | End: 2018-07-11

## 2018-07-08 RX ORDER — CHLORHEXIDINE GLUCONATE 213 G/1000ML
15 SOLUTION TOPICAL
Qty: 0 | Refills: 0 | Status: DISCONTINUED | OUTPATIENT
Start: 2018-07-08 | End: 2018-07-11

## 2018-07-08 RX ORDER — BUDESONIDE, MICRONIZED 100 %
0.5 POWDER (GRAM) MISCELLANEOUS
Qty: 0 | Refills: 0 | Status: DISCONTINUED | OUTPATIENT
Start: 2018-07-08 | End: 2018-07-11

## 2018-07-08 RX ORDER — ATORVASTATIN CALCIUM 80 MG/1
40 TABLET, FILM COATED ORAL AT BEDTIME
Qty: 0 | Refills: 0 | Status: DISCONTINUED | OUTPATIENT
Start: 2018-07-08 | End: 2018-07-11

## 2018-07-08 RX ORDER — IPRATROPIUM/ALBUTEROL SULFATE 18-103MCG
3 AEROSOL WITH ADAPTER (GRAM) INHALATION EVERY 4 HOURS
Qty: 0 | Refills: 0 | Status: DISCONTINUED | OUTPATIENT
Start: 2018-07-08 | End: 2018-07-10

## 2018-07-08 RX ADMIN — Medication 40 MILLIGRAM(S): at 22:53

## 2018-07-08 RX ADMIN — Medication 1 PATCH: at 11:01

## 2018-07-08 RX ADMIN — Medication 1 PATCH: at 00:45

## 2018-07-08 RX ADMIN — Medication 1 PATCH: at 11:00

## 2018-07-08 RX ADMIN — ATORVASTATIN CALCIUM 40 MILLIGRAM(S): 80 TABLET, FILM COATED ORAL at 22:53

## 2018-07-08 RX ADMIN — Medication 3 MILLILITER(S): at 08:46

## 2018-07-08 RX ADMIN — Medication 3 MILLILITER(S): at 23:53

## 2018-07-08 RX ADMIN — Medication 650 MILLIGRAM(S): at 05:18

## 2018-07-08 RX ADMIN — Medication 650 MILLIGRAM(S): at 22:30

## 2018-07-08 RX ADMIN — ENOXAPARIN SODIUM 40 MILLIGRAM(S): 100 INJECTION SUBCUTANEOUS at 22:53

## 2018-07-08 RX ADMIN — Medication 1 PATCH: at 22:02

## 2018-07-08 RX ADMIN — CHLORHEXIDINE GLUCONATE 15 MILLILITER(S): 213 SOLUTION TOPICAL at 17:04

## 2018-07-08 RX ADMIN — Medication 3 MILLILITER(S): at 03:51

## 2018-07-08 RX ADMIN — Medication 40 MILLIGRAM(S): at 17:04

## 2018-07-08 RX ADMIN — Medication 3 MILLILITER(S): at 21:09

## 2018-07-08 RX ADMIN — Medication 650 MILLIGRAM(S): at 22:00

## 2018-07-08 RX ADMIN — Medication 3 MILLILITER(S): at 15:10

## 2018-07-08 RX ADMIN — Medication 650 MILLIGRAM(S): at 06:00

## 2018-07-08 RX ADMIN — CHLORHEXIDINE GLUCONATE 1 APPLICATION(S): 213 SOLUTION TOPICAL at 21:00

## 2018-07-08 RX ADMIN — AMLODIPINE BESYLATE 5 MILLIGRAM(S): 2.5 TABLET ORAL at 05:18

## 2018-07-08 RX ADMIN — Medication 325 MILLIGRAM(S): at 11:00

## 2018-07-08 RX ADMIN — Medication 0.5 MILLIGRAM(S): at 21:07

## 2018-07-08 NOTE — CONSULT NOTE ADULT - SUBJECTIVE AND OBJECTIVE BOX
PULMONARY CONSULT NOTE      ALEJANDRA MEJÍAN-[id number]    Patient is a 73y old  Female who presents with a chief complaint of chest pain (06 Jul 2018 12:16)  Visiting from [place] and developed anterior chest pain>NSTEMI.  +cath with need for CABG.  +tobacco 1/2 PPD for most of adult life.  Reports cough only with URI.  No unusual exercise intolerance other than with exertional activities.  Currently denies chest pain, shortness of breath; some cough.     INTERVAL HPI/OVERNIGHT EVENTS:    MEDICATIONS  (STANDING):  ALBUTerol/ipratropium for Nebulization 3 milliLiter(s) Nebulizer every 4 hours  amLODIPine   Tablet 5 milliGRAM(s) Oral daily  aspirin 325 milliGRAM(s) Oral daily  buDESOnide   0.5 milliGRAM(s) Respule 0.5 milliGRAM(s) Inhalation two times a day  chlorhexidine 0.12% Liquid 15 milliLiter(s) Swish and Spit two times a day  enoxaparin Injectable 40 milliGRAM(s) SubCutaneous daily  methylPREDNISolone sodium succinate Injectable 40 milliGRAM(s) IV Push every 6 hours  nicotine - 21 mG/24Hr(s) Patch 1 patch Transdermal daily  nitroglycerin    Patch 0.1 mG/Hr(s) 1 patch Transdermal daily      MEDICATIONS  (PRN):  acetaminophen   Tablet. 650 milliGRAM(s) Oral every 6 hours PRN headache  hydrALAZINE Injectable 5 milliGRAM(s) IV Push every 6 hours PRN for sbp above 160 mmhg  LORazepam   Injectable 0.25 milliGRAM(s) IV Push every 8 hours PRN Agitation  morphine  - Injectable 2 milliGRAM(s) IV Push every 4 hours PRN chest pain      Allergies    latex (Unknown)  No Known Drug Allergies    Intolerances        PAST MEDICAL & SURGICAL HISTORY:  HTN (hypertension)  No significant past surgical history      FAMILY HISTORY:  No pertinent family history in first degree relatives      SOCIAL HISTORY  Smoking History: Active    REVIEW OF SYSTEMS:    CONSTITUTIONAL:  As per HPI.    HEENT:  Eyes:  No diplopia or blurred vision. ENT:  No earache, sore throat or runny nose.    CARDIOVASCULAR: Per HPI    RESPIRATORY: Per HPI    GASTROINTESTINAL:  No nausea, vomiting or diarrhea.    GENITOURINARY:  No dysuria, frequency or urgency.    MUSCULOSKELETAL:  No joint pains    SKIN:  No new lesions.    NEUROLOGIC:  No paresthesias, fasciculations, seizures or weakness.    PSYCHIATRIC:  No disorder of thought or mood.    ENDOCRINE:  No heat or cold intolerance, polyuria or polydipsia.    HEMATOLOGICAL:  No easy bruising or bleeding.     Vital Signs Last 24 Hrs  T(C): 36.7 (08 Jul 2018 10:22), Max: 37.1 (08 Jul 2018 05:13)  T(F): 98.1 (08 Jul 2018 10:22), Max: 98.8 (08 Jul 2018 05:13)  HR: 72 (08 Jul 2018 10:58) (59 - 89)  BP: 122/68 (08 Jul 2018 10:58) (100/71 - 138/60)  BP(mean): --  RR: 18 (08 Jul 2018 10:58) (18 - 18)  SpO2: 98% (08 Jul 2018 10:58) (95% - 98%)    PHYSICAL EXAMINATION:    GENERAL: The patient is a well-developed, well-nourished _____in no apparent distress.     HEENT: Head is normocephalic and atraumatic. Extraocular muscles are intact. Mucous membranes are moist.     NECK: Supple.     LUNGS: Bilateral scattered expiratory wheezes and rhonchi    HEART: Regular rate and rhythm without murmur.    ABDOMEN: Soft, nontender, and nondistended.  No hepatosplenomegaly is noted.    EXTREMITIES: Without any cyanosis, clubbing, rash, lesions or edema.    NEUROLOGIC: Grossly intact.    SKIN: No ulceration or induration present.      LABS:                        12.8   10.0  )-----------( 264      ( 08 Jul 2018 05:57 )             38.9     07-08    142  |  99  |  12.0  ----------------------------<  108  4.0   |  25.0  |  0.55    Ca    9.5      08 Jul 2018 05:57  Phos  4.0     07-08  Mg     2.3     07-08    TPro  7.4  /  Alb  4.1  /  TBili  0.6  /  DBili  0.1  /  AST  12  /  ALT  8   /  AlkPhos  101  07-08    PT/INR - ( 08 Jul 2018 05:57 )   PT: 11.6 sec;   INR: 1.05 ratio         PTT - ( 08 Jul 2018 05:57 )  PTT:37.1 sec      CARDIAC MARKERS ( 07 Jul 2018 06:05 )  x     / 0.20 ng/mL / x     / x     / x      CARDIAC MARKERS ( 06 Jul 2018 15:34 )  x     / 0.34 ng/mL / x     / x     / x            Serum Pro-Brain Natriuretic Peptide: 175 pg/mL (07-06-18 @ 05:10)          MICROBIOLOGY:    RADIOLOGY & ADDITIONAL STUDIES:< from: CT Chest w/ IV Cont (07.06.18 @ 06:48) >   EXAM:  CT ABDOMEN AND PELVIS OC IC                         EXAM:  CT CHEST IC                          PROCEDURE DATE:  07/06/2018          INTERPRETATION:  CLINICAL INFORMATION: Chest and epigastric pain.    COMPARISON: None available.    PROCEDURE:   CT of the Chest, Abdomen and Pelvis was performed with intravenous   contrast.   Imaging was performed through the chest in the arterial phase followed by   imaging of the abdomen and pelvis in the portal venous phase.  Intravenous contrast: 92 ml Omnipaque 300. 8 ml discarded.  Oral contrast: Positive oral contrast.  Sagittal and coronal reformats were performed.    FINDINGS:    CHEST:     LUNGS AND LARGE AIRWAYS: Patent central airways. 2 mm right upper lobe   pulmonary nodule (series 3, image 37). Left upper in the lower lobe   fissural nodular opacities, likely intrapulmonary lymph nodes.  PLEURA: No pleural effusion.  VESSELS: Atherosclerosis of the thoracic aorta. No central pulmonary   embolus.  HEART: Heart size is normal. No pericardial effusion. Coronary artery   calcification and/or stents.  MEDIASTINUM AND PARAG: No lymphadenopathy.  CHEST WALL AND LOWER NECK: Within normal limits.    ABDOMEN AND PELVIS:    LIVER: Within normal limits.  BILE DUCTS: Normal caliber.  GALLBLADDER: Cholelithiasis.  SPLEEN: Within normal limits.  PANCREAS: Within normal limits.  ADRENALS: Within normal limits.  KIDNEYS/URETERS: Nonobstructing left renal calculi measuring up to 4 mm.   No hydronephrosis. Symmetric parenchymal enhancement.    BLADDER: Within normal limits.  REPRODUCTIVE ORGANS: The uterus and adnexa are within normal limits.    BOWEL: No bowel obstruction. Appendix normal.  PERITONEUM: No ascites.  VESSELS:  Atherosclerosis of the abdominal aorta and its branch vessels.  RETROPERITONEUM: No lymphadenopathy.    ABDOMINAL WALL: Within normal limits.  BONES: Multilevel degenerative changes of the spine.    IMPRESSION:    No CT evidence of acute pathology    < end of copied text >

## 2018-07-08 NOTE — PROGRESS NOTE ADULT - SUBJECTIVE AND OBJECTIVE BOX
Subjective: Pt sitting up in chair.  Denies CP or SOB.  NAD noted.  Granddaughter at bedside.        Tele:   SR                       T(F): 98.5 (18 @ 16:09), Max: 98.8 (18 @ 05:13)  HR: 94 (18 @ 16:09) (59 - 94)  BP: 122/69 (18 @ 16:09) (100/71 - 138/60)  RR: 18 (18 @ 16:09) (18 - 18)  SpO2: 98% (18 @ 16:09) (95% - 98%) on room air at rest.    Daily     Daily Weight in k (2018 04:00)    LV EF: 70-75%    latex (Unknown)  No Known Drug Allergies          142  |  99  |  12.0  ----------------------------<  108  4.0   |  25.0  |  0.55    Ca    9.5      2018 05:57  Phos  4.0       Mg     2.3         TPro  7.4  /  Alb  4.1  /  TBili  0.6  /  DBili  0.1  /  AST  12  /  ALT  8   /  AlkPhos  101  08                               12.8   10.0  )-----------( 264      ( 2018 05:57 )             38.9        PT/INR - ( 2018 05:57 )   PT: 11.6 sec;   INR: 1.05 ratio         PTT - ( 2018 05:57 )  PTT:37.1 sec              CXR: < from: Xray Chest 2 Views PA/Lat (18 @ 05:12) >  EXAM:  XR CHEST PA LAT 2V                          PROCEDURE DATE:  2018          INTERPRETATION:    CHEST X-RAY PA AND LATERAL:    History: Epigastric pain.    Date and time of exam: 2018 5:17 AM.    Comparison exam: No prior exam.    Technique: PA and lateral views of the chest were obtained.    Findings:  The lungs are clear.  The heart is enlarged. The aorta is tortuous. The   mediastinum is unremarkable. No hilar abnormality is seen. There is no   evidence of pleural effusion.  The visualized osseous structures   demonstrate degenerative changes in the spine.    Impression:  Cardiomegaly. Clear lungs.      DOREEN RIGGS M.D., ATTENDING RADIOLOGIST  This document has been electronically signed. 2018  9:22AM    < end of copied text >    < from: TTE Echo Complete w/Doppler (18 @ 13:32) >  Summary:   1. Technically difficult study.   2. There is mild concentric left ventricular hypertrophy.  3. Hyperdynamic global left ventricular systolic function.   4. Left ventricular ejection fraction, by visual estimation, is 70 to   75%.   5. Spectral Doppler shows impaired relaxation pattern of left   ventricular myocardial filling (Grade I diastolic dysfunction).   6. Normal right ventricular size and function.   7. Mildly enlarged left atrium.   8. The right atrium is normal in size.   9. Sclerotic aortic valve with normal opening.  10. Normal pulmonary artery pressure.  11. Moderate sized plaque involving the ascending aorta.  12. There is no evidence of pericardial effusion.    [id number] Elvin Lozoya MD, Electronically signed on 2018 at 4:27:52 PM      < end of copied text >    < from: US Duplex Carotid Arteries Complete, Bilateral (18 @ 10:03) >  Impression:    No evidence of hemodynamically significant stenosis in the right internal   carotid artery.    50-69% stenosis in the region of the left carotid bulb/proximal left   internal carotid artery.      RED CABRERA M.D., ATTENDING RADIOLOGIST  This document has been electronically signed. 2018 10:24AM        < end of copied text >      < from: CT Chest w/ IV Cont (18 @ 06:48) >  IMPRESSION:    No CT evidence of acute pathology within the chest, abdomen, or pelvis.                      DESTINEY TRUJILLO M.D., ATTENDING RADIOLOGIST  This document has been electronically signed. 2018  7:17AM    < end of copied text >          I&O's Detail    2018 07:01  -  2018 07:00  --------------------------------------------------------  IN:    Oral Fluid: 480 mL  Total IN: 480 mL    OUT:    Voided: 1950 mL  Total OUT: 1950 mL    Total NET: -1470 mL      2018 07:01  -  2018 16:52  --------------------------------------------------------  IN:    Oral Fluid: 480 mL  Total IN: 480 mL    OUT:    Voided: 500 mL  Total OUT: 500 mL    Total NET: -20 mL          Active Medications:  acetaminophen   Tablet. 650 milliGRAM(s) Oral every 6 hours PRN  ALBUTerol/ipratropium for Nebulization 3 milliLiter(s) Nebulizer every 4 hours  amLODIPine   Tablet 5 milliGRAM(s) Oral daily  aspirin 325 milliGRAM(s) Oral daily  buDESOnide   0.5 milliGRAM(s) Respule 0.5 milliGRAM(s) Inhalation two times a day  chlorhexidine 0.12% Liquid 15 milliLiter(s) Swish and Spit two times a day  enoxaparin Injectable 40 milliGRAM(s) SubCutaneous daily  hydrALAZINE Injectable 5 milliGRAM(s) IV Push every 6 hours PRN  LORazepam   Injectable 0.25 milliGRAM(s) IV Push every 8 hours PRN  methylPREDNISolone sodium succinate Injectable 40 milliGRAM(s) IV Push every 6 hours  morphine  - Injectable 2 milliGRAM(s) IV Push every 4 hours PRN  nicotine - 21 mG/24Hr(s) Patch 1 patch Transdermal daily  nitroglycerin    Patch 0.1 mG/Hr(s) 1 patch Transdermal daily      Physical Exam:    Neuro: AAOX3.  No focal deficits.    Pulm: Diminished BLL with slight expiratory wheezing.    CV: RRR.  +S1+S2.    Abd: Soft/NT/ND.  +BS.    Extremities: +pp                      PAST MEDICAL & SURGICAL HISTORY:  HTN (hypertension)  No significant past surgical history

## 2018-07-08 NOTE — CONSULT NOTE ADULT - ASSESSMENT
Personal review of CT demonstrates some element of emphysema to the upper zones.  No other significant findings  Currently with some wheezing however consistent with element of COPD and likely related to cessation of tobacco approximately one week ago.  Spirometry done was reviewed and is not a good study with at least values.  However by examination of the flow volume loop patient does not have evidence of severe disease and likely at most mild to moderate    Plan:  1.Duoneb q4 WA  2.Budesonide BID  3.Brief steroids  4.Have asked respiratory to attempt to obtain a better study>language issues prominent and unfortunately  during study does not improve necessarily quality of study due to timing of maneuvers  5. No pulmonary contraindications to procedure as necessary  6.Continue nebs post op.

## 2018-07-08 NOTE — PROGRESS NOTE ADULT - PROBLEM SELECTOR PLAN 3
Current cigarette smoker.  Smoking cessation education.  Pulmonary consult appreciated.  Course of steroids added, as well as nebs.    Discussed with CT surgery team and Dr. Manuel in AM rounds

## 2018-07-08 NOTE — PROGRESS NOTE ADULT - ASSESSMENT
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

## 2018-07-08 NOTE — PROGRESS NOTE ADULT - ASSESSMENT
73y F, pmhx HTN, p/w 2 day h/o intermittent CP under L breast, rule in for NSTEMI, cardiac cath yesterday with triple vessel disease. CT surgery asked to deanna. Pt reports pain started morning of 4th of July while making her bed. Minimal radiation across anterior chest. States she took some advil and ASA initially with some relief.  Pt also admits to SOB and feeling fatigue with one flight of stairs.

## 2018-07-08 NOTE — PROGRESS NOTE ADULT - SUBJECTIVE AND OBJECTIVE BOX
ALEJANDRA RANDALLLOSALLISON    [id number]    73y      Female    INTERVAL HPI/OVERNIGHT EVENTS:    patient being seen for multivessel disease and active smoker. PAtient seen at bedside with family and complains of intermittent headaches.       REVIEW OF SYSTEMS:    CONSTITUTIONAL: No fever, weight loss, or fatigue  RESPIRATORY: No cough, wheezing, hemoptysis; No shortness of breath  CARDIOVASCULAR: No chest pain, palpitations  GASTROINTESTINAL: No abdominal or epigastric pain. No nausea, vomiting  NEUROLOGICAL: headaches  MISCELLANEOUS:      Vital Signs Last 24 Hrs  T(C): 36.7 (08 Jul 2018 10:22), Max: 37.1 (08 Jul 2018 05:13)  T(F): 98.1 (08 Jul 2018 10:22), Max: 98.8 (08 Jul 2018 05:13)  HR: 72 (08 Jul 2018 10:58) (59 - 89)  BP: 122/68 (08 Jul 2018 10:58) (100/71 - 138/60)  BP(mean): --  RR: 18 (08 Jul 2018 10:58) (18 - 18)  SpO2: 98% (08 Jul 2018 10:58) (95% - 98%)    PHYSICAL EXAM:    	GENERAL: NAD, speaking in full sentences  	EYES: EOMI, PERRLA,   	ENMT: No tonsillar erythema, exudates, or enlargement;   	NECK: Supple, No JVD, Normal thyroid  	NERVOUS SYSTEM:  Alert & Oriented X3, Good concentration;   	CHEST/LUNG: clear   	HEART: Regular rate and rhythm; No murmurs,  	ABDOMEN: Soft, Nontender, Nondistended;   	EXTREMITIES:  2+ Peripheral Pulses, No edema  	LYMPH: No lymphadenopathy noted  SKIN: No rashes or lesions    LABS:                        12.8   10.0  )-----------( 264      ( 08 Jul 2018 05:57 )             38.9     07-08    142  |  99  |  12.0  ----------------------------<  108  4.0   |  25.0  |  0.55    Ca    9.5      08 Jul 2018 05:57  Phos  4.0     07-08  Mg     2.3     07-08    TPro  7.4  /  Alb  4.1  /  TBili  0.6  /  DBili  0.1  /  AST  12  /  ALT  8   /  AlkPhos  101  07-08    PT/INR - ( 08 Jul 2018 05:57 )   PT: 11.6 sec;   INR: 1.05 ratio         PTT - ( 08 Jul 2018 05:57 )  PTT:37.1 sec        MEDICATIONS  (STANDING):  ALBUTerol/ipratropium for Nebulization 3 milliLiter(s) Nebulizer every 4 hours  amLODIPine   Tablet 5 milliGRAM(s) Oral daily  aspirin 325 milliGRAM(s) Oral daily  buDESOnide   0.5 milliGRAM(s) Respule 0.5 milliGRAM(s) Inhalation two times a day  chlorhexidine 0.12% Liquid 15 milliLiter(s) Swish and Spit two times a day  enoxaparin Injectable 40 milliGRAM(s) SubCutaneous daily  methylPREDNISolone sodium succinate Injectable 40 milliGRAM(s) IV Push every 6 hours  nicotine - 21 mG/24Hr(s) Patch 1 patch Transdermal daily  nitroglycerin    Patch 0.1 mG/Hr(s) 1 patch Transdermal daily    MEDICATIONS  (PRN):  acetaminophen   Tablet. 650 milliGRAM(s) Oral every 6 hours PRN headache  hydrALAZINE Injectable 5 milliGRAM(s) IV Push every 6 hours PRN for sbp above 160 mmhg  LORazepam   Injectable 0.25 milliGRAM(s) IV Push every 8 hours PRN Agitation  morphine  - Injectable 2 milliGRAM(s) IV Push every 4 hours PRN chest pain      RADIOLOGY & ADDITIONAL TESTS:

## 2018-07-09 DIAGNOSIS — R53.83 OTHER FATIGUE: ICD-10-CM

## 2018-07-09 PROBLEM — Z00.00 ENCOUNTER FOR PREVENTIVE HEALTH EXAMINATION: Status: ACTIVE | Noted: 2018-07-09

## 2018-07-09 PROBLEM — I10 ESSENTIAL (PRIMARY) HYPERTENSION: Chronic | Status: ACTIVE | Noted: 2018-07-06

## 2018-07-09 LAB — TROPONIN T SERPL-MCNC: 0.07 NG/ML — HIGH (ref 0–0.06)

## 2018-07-09 PROCEDURE — 99233 SBSQ HOSP IP/OBS HIGH 50: CPT

## 2018-07-09 PROCEDURE — 99232 SBSQ HOSP IP/OBS MODERATE 35: CPT

## 2018-07-09 PROCEDURE — 93010 ELECTROCARDIOGRAM REPORT: CPT

## 2018-07-09 RX ADMIN — Medication 1 PATCH: at 09:36

## 2018-07-09 RX ADMIN — Medication 3 MILLILITER(S): at 11:59

## 2018-07-09 RX ADMIN — Medication 3 MILLILITER(S): at 20:55

## 2018-07-09 RX ADMIN — ENOXAPARIN SODIUM 40 MILLIGRAM(S): 100 INJECTION SUBCUTANEOUS at 21:21

## 2018-07-09 RX ADMIN — AMLODIPINE BESYLATE 5 MILLIGRAM(S): 2.5 TABLET ORAL at 05:22

## 2018-07-09 RX ADMIN — Medication 1 PATCH: at 20:41

## 2018-07-09 RX ADMIN — CHLORHEXIDINE GLUCONATE 15 MILLILITER(S): 213 SOLUTION TOPICAL at 17:12

## 2018-07-09 RX ADMIN — Medication 3 MILLILITER(S): at 03:51

## 2018-07-09 RX ADMIN — ATORVASTATIN CALCIUM 40 MILLIGRAM(S): 80 TABLET, FILM COATED ORAL at 21:21

## 2018-07-09 RX ADMIN — Medication 0.5 MILLIGRAM(S): at 08:45

## 2018-07-09 RX ADMIN — Medication 1 PATCH: at 09:30

## 2018-07-09 RX ADMIN — Medication 40 MILLIGRAM(S): at 10:33

## 2018-07-09 RX ADMIN — CHLORHEXIDINE GLUCONATE 1 APPLICATION(S): 213 SOLUTION TOPICAL at 10:39

## 2018-07-09 RX ADMIN — CHLORHEXIDINE GLUCONATE 15 MILLILITER(S): 213 SOLUTION TOPICAL at 05:25

## 2018-07-09 RX ADMIN — Medication 3 MILLILITER(S): at 08:45

## 2018-07-09 RX ADMIN — Medication 40 MILLIGRAM(S): at 05:22

## 2018-07-09 RX ADMIN — Medication 3 MILLILITER(S): at 15:55

## 2018-07-09 RX ADMIN — CHLORHEXIDINE GLUCONATE 1 APPLICATION(S): 213 SOLUTION TOPICAL at 19:21

## 2018-07-09 RX ADMIN — Medication 0.25 MILLIGRAM(S): at 10:08

## 2018-07-09 RX ADMIN — Medication 3 MILLILITER(S): at 23:47

## 2018-07-09 RX ADMIN — Medication 325 MILLIGRAM(S): at 09:36

## 2018-07-09 NOTE — PROGRESS NOTE ADULT - SUBJECTIVE AND OBJECTIVE BOX
PULMONARY PROGRESS NOTE      [id number]    Patient is a 73y old  Female who presents with a chief complaint of chest pain (06 Jul 2018 12:16)  NSTEMI  Smoker  Overweight    INTERVAL HPI/OVERNIGHT EVENTS:  MIld chest pain  Somnolent and confused per her daughter    MEDICATIONS  (STANDING):  ALBUTerol/ipratropium for Nebulization 3 milliLiter(s) Nebulizer every 4 hours  amLODIPine   Tablet 5 milliGRAM(s) Oral daily  aspirin 325 milliGRAM(s) Oral daily  atorvastatin 40 milliGRAM(s) Oral at bedtime  buDESOnide   0.5 milliGRAM(s) Respule 0.5 milliGRAM(s) Inhalation two times a day  chlorhexidine 0.12% Liquid 15 milliLiter(s) Swish and Spit two times a day  chlorhexidine 4% Liquid 1 Application(s) Topical two times a day  enoxaparin Injectable 40 milliGRAM(s) SubCutaneous daily  nicotine - 21 mG/24Hr(s) Patch 1 patch Transdermal daily  nitroglycerin    Patch 0.1 mG/Hr(s) 1 patch Transdermal daily      MEDICATIONS  (PRN):  acetaminophen   Tablet. 650 milliGRAM(s) Oral every 6 hours PRN headache  hydrALAZINE Injectable 5 milliGRAM(s) IV Push every 6 hours PRN for sbp above 160 mmhg  LORazepam   Injectable 0.25 milliGRAM(s) IV Push every 8 hours PRN Agitation  morphine  - Injectable 2 milliGRAM(s) IV Push every 4 hours PRN chest pain      Allergies    latex (Unknown)  No Known Drug Allergies    Intolerances        PAST MEDICAL & SURGICAL HISTORY:  HTN (hypertension)  No significant past surgical history      SOCIAL HISTORY  Smoking History:       REVIEW OF SYSTEMS:    CONSTITUTIONAL:  No distress    HEENT:  Eyes:  No diplopia or blurred vision. ENT:  No earache, sore throat or runny nose.    CARDIOVASCULAR:  No palpitations.    RESPIRATORY:  No cough, shortness of breath, PND or orthopnea. Mild SOBOE    GASTROINTESTINAL:  No nausea, vomiting or diarrhea.    GENITOURINARY:  No dysuria, frequency or urgency.    NEUROLOGIC:  No paresthesias, fasciculations, seizures or weakness.    PSYCHIATRIC:  No disorder of thought or mood.    Vital Signs Last 24 Hrs  T(C): 36.6 (09 Jul 2018 05:35), Max: 37.1 (08 Jul 2018 22:45)  T(F): 97.8 (09 Jul 2018 05:35), Max: 98.7 (08 Jul 2018 22:45)  HR: 103 (09 Jul 2018 09:24) (75 - 104)  BP: 162/90 (09 Jul 2018 09:24) (122/69 - 162/90)  BP(mean): --  RR: 17 (09 Jul 2018 09:24) (17 - 18)  SpO2: 95% (09 Jul 2018 09:24) (93% - 98%)    PHYSICAL EXAMINATION:    GENERAL: The patient is awake and alert in no apparent distress.     HEENT: Head is normocephalic and atraumatic. Extraocular muscles are intact. Mucous membranes are moist.    NECK: Supple.    LUNGS: Clear to auscultation without wheezing, rales or rhonchi; respirations unlabored    HEART: Regular rate and rhythm without murmur.    ABDOMEN: Soft, nontender, and nondistended.      EXTREMITIES: Without any cyanosis, clubbing, rash, lesions or edema.    NEUROLOGIC: Grossly intact.    LABS:                        12.8   10.0  )-----------( 264      ( 08 Jul 2018 05:57 )             38.9     07-08    142  |  99  |  12.0  ----------------------------<  108  4.0   |  25.0  |  0.55    Ca    9.5      08 Jul 2018 05:57  Phos  4.0     07-08  Mg     2.3     07-08    TPro  7.4  /  Alb  4.1  /  TBili  0.6  /  DBili  0.1  /  AST  12  /  ALT  8   /  AlkPhos  101  07-08    PT/INR - ( 08 Jul 2018 05:57 )   PT: 11.6 sec;   INR: 1.05 ratio         PTT - ( 08 Jul 2018 05:57 )  PTT:37.1 sec    Adrien from 7/7/18  Restricted - likely poor effort    RADIOLOGY & ADDITIONAL STUDIES:  \  CT of chest, abdomen and pelvis unremarkable on 7/6/18 PULMONARY PROGRESS NOTE      ALEJANDRA MEJÍAN-[id number]    Patient is a 73y old  Female who presents with a chief complaint of chest pain (06 Jul 2018 12:16)  NSTEMI  Smoker  Overweight    INTERVAL HPI/OVERNIGHT EVENTS:  MIld chest pain  Somnolent and confused per her daughter after benzo for steroid related aggitation    MEDICATIONS  (STANDING):  ALBUTerol/ipratropium for Nebulization 3 milliLiter(s) Nebulizer every 4 hours  amLODIPine   Tablet 5 milliGRAM(s) Oral daily  aspirin 325 milliGRAM(s) Oral daily  atorvastatin 40 milliGRAM(s) Oral at bedtime  buDESOnide   0.5 milliGRAM(s) Respule 0.5 milliGRAM(s) Inhalation two times a day  chlorhexidine 0.12% Liquid 15 milliLiter(s) Swish and Spit two times a day  chlorhexidine 4% Liquid 1 Application(s) Topical two times a day  enoxaparin Injectable 40 milliGRAM(s) SubCutaneous daily  nicotine - 21 mG/24Hr(s) Patch 1 patch Transdermal daily  nitroglycerin    Patch 0.1 mG/Hr(s) 1 patch Transdermal daily      MEDICATIONS  (PRN):  acetaminophen   Tablet. 650 milliGRAM(s) Oral every 6 hours PRN headache  hydrALAZINE Injectable 5 milliGRAM(s) IV Push every 6 hours PRN for sbp above 160 mmhg  LORazepam   Injectable 0.25 milliGRAM(s) IV Push every 8 hours PRN Agitation  morphine  - Injectable 2 milliGRAM(s) IV Push every 4 hours PRN chest pain      Allergies    latex (Unknown)  No Known Drug Allergies    Intolerances        PAST MEDICAL & SURGICAL HISTORY:  HTN (hypertension)  No significant past surgical history      SOCIAL HISTORY  Smoking History:       REVIEW OF SYSTEMS:    CONSTITUTIONAL:  No distress    HEENT:  Eyes:  No diplopia or blurred vision. ENT:  No earache, sore throat or runny nose.    CARDIOVASCULAR:  No palpitations.    RESPIRATORY:  No cough, shortness of breath, PND or orthopnea. Mild SOBOE    GASTROINTESTINAL:  No nausea, vomiting or diarrhea.    GENITOURINARY:  No dysuria, frequency or urgency.    NEUROLOGIC:  No paresthesias, fasciculations, seizures or weakness.    PSYCHIATRIC:  No disorder of thought or mood.    Vital Signs Last 24 Hrs  T(C): 36.6 (09 Jul 2018 05:35), Max: 37.1 (08 Jul 2018 22:45)  T(F): 97.8 (09 Jul 2018 05:35), Max: 98.7 (08 Jul 2018 22:45)  HR: 103 (09 Jul 2018 09:24) (75 - 104)  BP: 162/90 (09 Jul 2018 09:24) (122/69 - 162/90)  BP(mean): --  RR: 17 (09 Jul 2018 09:24) (17 - 18)  SpO2: 95% (09 Jul 2018 09:24) (93% - 98%)    PHYSICAL EXAMINATION:    GENERAL: The patient is awake and alert in no apparent distress.     HEENT: Head is normocephalic and atraumatic. Extraocular muscles are intact. Mucous membranes are moist.    NECK: Supple.    LUNGS: Clear to auscultation without wheezing, rales or rhonchi; respirations unlabored    HEART: Regular rate and rhythm without murmur.    ABDOMEN: Soft, nontender, and nondistended.      EXTREMITIES: Without any cyanosis, clubbing, rash, lesions or edema.    NEUROLOGIC: Grossly intact.    LABS:                        12.8   10.0  )-----------( 264      ( 08 Jul 2018 05:57 )             38.9     07-08    142  |  99  |  12.0  ----------------------------<  108  4.0   |  25.0  |  0.55    Ca    9.5      08 Jul 2018 05:57  Phos  4.0     07-08  Mg     2.3     07-08    TPro  7.4  /  Alb  4.1  /  TBili  0.6  /  DBili  0.1  /  AST  12  /  ALT  8   /  AlkPhos  101  07-08    PT/INR - ( 08 Jul 2018 05:57 )   PT: 11.6 sec;   INR: 1.05 ratio         PTT - ( 08 Jul 2018 05:57 )  PTT:37.1 sec    Adrien from 7/7/18  Restricted - likely poor effort    RADIOLOGY & ADDITIONAL STUDIES:  \  CT of chest, abdomen and pelvis unremarkable on 7/6/18

## 2018-07-09 NOTE — PROGRESS NOTE ADULT - PROBLEM SELECTOR PLAN 2
Continue ASA,statin  Beta blocker initiall defferred due to bradycardia.> will re evaluate  DASH/TLC diet.

## 2018-07-09 NOTE — PROGRESS NOTE ADULT - ASSESSMENT
Assess    Mild emphysema  Overweight with some restriction  NSTEMI  Suspect mild GERD with mild bronchospasm  No significant pulmonary contraindication to CABG under general anesthesia      Plan    Duoneb/budesonide  Truncal elevation  Reduce sedation  Repeat aiden if able Assess    Steroid related aggitation  Mild emphysema  Overweight with some restriction  NSTEMI  Suspect mild GERD with mild bronchospasm  No significant pulmonary contraindication to CABG under general anesthesia      Plan    Duoneb/budesonide  DC steroids  Truncal elevation  Reduce sedation  Repeat aiden if able

## 2018-07-09 NOTE — PROGRESS NOTE ADULT - SUBJECTIVE AND OBJECTIVE BOX
ALEJANDRA RANDALLLOSALLISON    [id number]    73y      Female    INTERVAL HPI/OVERNIGHT EVENTS:    patient being seen for multivessel disease and med management. Patient seen at bedside and complains of headache and palpitations.     REVIEW OF SYSTEMS:    CONSTITUTIONAL: No fever, weight loss, or fatigue  RESPIRATORY: No cough, wheezing, hemoptysis; No shortness of breath  CARDIOVASCULAR: No chest pain, palpitations  GASTROINTESTINAL: No abdominal or epigastric pain. No nausea, vomiting  NEUROLOGICAL: headache,   MISCELLANEOUS:      Vital Signs Last 24 Hrs  T(C): 36.6 (09 Jul 2018 05:35), Max: 37.1 (08 Jul 2018 22:45)  T(F): 97.8 (09 Jul 2018 05:35), Max: 98.7 (08 Jul 2018 22:45)  HR: 103 (09 Jul 2018 09:24) (75 - 104)  BP: 162/90 (09 Jul 2018 09:24) (122/69 - 162/90)  BP(mean): --  RR: 17 (09 Jul 2018 09:24) (17 - 18)  SpO2: 95% (09 Jul 2018 09:24) (93% - 98%)    PHYSICAL EXAM:    	GENERAL: NAD,   	EYES: EOMI, PERRLA,   	NECK: Supple, No JVD, Normal thyroid  	NERVOUS SYSTEM:  Alert & Oriented X3,  	HEART: Regular rate and rhythm; No murmurs,  	ABDOMEN: Soft, Nontender, Nondistended;   	EXTREMITIES:  2+ Peripheral Pulses, No edema  	LYMPH: No lymphadenopathy noted  SKIN: No rashes or lesions      LABS:                        12.8   10.0  )-----------( 264      ( 08 Jul 2018 05:57 )             38.9     07-08    142  |  99  |  12.0  ----------------------------<  108  4.0   |  25.0  |  0.55    Ca    9.5      08 Jul 2018 05:57  Phos  4.0     07-08  Mg     2.3     07-08    TPro  7.4  /  Alb  4.1  /  TBili  0.6  /  DBili  0.1  /  AST  12  /  ALT  8   /  AlkPhos  101  07-08    PT/INR - ( 08 Jul 2018 05:57 )   PT: 11.6 sec;   INR: 1.05 ratio         PTT - ( 08 Jul 2018 05:57 )  PTT:37.1 sec        MEDICATIONS  (STANDING):  ALBUTerol/ipratropium for Nebulization 3 milliLiter(s) Nebulizer every 4 hours  amLODIPine   Tablet 5 milliGRAM(s) Oral daily  aspirin 325 milliGRAM(s) Oral daily  atorvastatin 40 milliGRAM(s) Oral at bedtime  buDESOnide   0.5 milliGRAM(s) Respule 0.5 milliGRAM(s) Inhalation two times a day  chlorhexidine 0.12% Liquid 15 milliLiter(s) Swish and Spit two times a day  chlorhexidine 4% Liquid 1 Application(s) Topical two times a day  enoxaparin Injectable 40 milliGRAM(s) SubCutaneous daily  nicotine - 21 mG/24Hr(s) Patch 1 patch Transdermal daily  nitroglycerin    Patch 0.1 mG/Hr(s) 1 patch Transdermal daily    MEDICATIONS  (PRN):  acetaminophen   Tablet. 650 milliGRAM(s) Oral every 6 hours PRN headache  hydrALAZINE Injectable 5 milliGRAM(s) IV Push every 6 hours PRN for sbp above 160 mmhg  LORazepam   Injectable 0.25 milliGRAM(s) IV Push every 8 hours PRN Agitation  morphine  - Injectable 2 milliGRAM(s) IV Push every 4 hours PRN chest pain      RADIOLOGY & ADDITIONAL TESTS:

## 2018-07-09 NOTE — PROGRESS NOTE ADULT - ASSESSMENT
1) multivessel disease-> ct surgery following  --> plan as per ct surgery  --> for cabg this week     2) NSTEMI --> plan as per #1  ---> plan as per cardio and ct surgery     3) smoker/copd --> patch  --> advised to quit   --> plan as per pulmonary     4) htn --> stable    5) headache --> tylenol prn     6) anxiety --> ativan prn

## 2018-07-09 NOTE — PROGRESS NOTE ADULT - PROBLEM SELECTOR PLAN 3
Current cigarette smoker.  Smoking cessation education.  Pulmonary following  Supplemental O2  Received short Course of steroids > d/cd with tachycardia, agitation  Continue nebs.

## 2018-07-09 NOTE — PROGRESS NOTE ADULT - SUBJECTIVE AND OBJECTIVE BOX
Subjective: Unable to verbalize  Dtrs at bedside> Language services Romy communicated w/ family    Tele:                              T(C): 36.6 (07-09-18 @ 05:35), Max: 37.1 (07-08-18 @ 22:45)  HR: 103 (07-09-18 @ 09:24) (75 - 104)  BP: 162/90 (07-09-18 @ 09:24) (122/69 - 162/90)  RR: 17 (07-09-18 @ 09:24) (17 - 18)  SpO2: 95% (07-09-18 @ 09:24) (93% - 98%)    LVEF: 70%      07-08    142  |  99  |  12.0  ----------------------------<  108  4.0   |  25.0  |  0.55    Ca    9.5      08 Jul 2018 05:57  Phos  4.0     07-08  Mg     2.3     07-08    TPro  7.4  /  Alb  4.1  /  TBili  0.6  /  DBili  0.1  /  AST  12  /  ALT  8   /  AlkPhos  101  07-08                               12.8   10.0  )-----------( 264      ( 08 Jul 2018 05:57 )             38.9        PT/INR - ( 08 Jul 2018 05:57 )   PT: 11.6 sec;   INR: 1.05 ratio         PTT - ( 08 Jul 2018 05:57 )  PTT:37.1 sec        Assessment    Neuro: lethargic, difficult to arouse....pt received IV ativan "for agitation"  1 hour ago    Pulm: anteriorly clear> supplemental applied    CV: S1 S2 tachycardia    Abd: soft    Extremities: no edema      MEDICATIONS  (STANDING):  ALBUTerol/ipratropium for Nebulization 3 milliLiter(s) Nebulizer every 4 hours  amLODIPine   Tablet 5 milliGRAM(s) Oral daily  aspirin 325 milliGRAM(s) Oral daily  atorvastatin 40 milliGRAM(s) Oral at bedtime  buDESOnide   0.5 milliGRAM(s) Respule 0.5 milliGRAM(s) Inhalation two times a day  chlorhexidine 0.12% Liquid 15 milliLiter(s) Swish and Spit two times a day  chlorhexidine 4% Liquid 1 Application(s) Topical two times a day  enoxaparin Injectable 40 milliGRAM(s) SubCutaneous daily  nicotine - 21 mG/24Hr(s) Patch 1 patch Transdermal daily  nitroglycerin    Patch 0.1 mG/Hr(s) 1 patch Transdermal daily       PAST MEDICAL & SURGICAL HISTORY:  HTN (hypertension)  No significant past surgical history

## 2018-07-10 ENCOUNTER — TRANSCRIPTION ENCOUNTER (OUTPATIENT)
Age: 73
End: 2018-07-10

## 2018-07-10 LAB
ANION GAP SERPL CALC-SCNC: 16 MMOL/L — SIGNIFICANT CHANGE UP (ref 5–17)
BLD GP AB SCN SERPL QL: SIGNIFICANT CHANGE UP
BUN SERPL-MCNC: 25 MG/DL — HIGH (ref 8–20)
CALCIUM SERPL-MCNC: 9.5 MG/DL — SIGNIFICANT CHANGE UP (ref 8.6–10.2)
CHLORIDE SERPL-SCNC: 96 MMOL/L — LOW (ref 98–107)
CK SERPL-CCNC: 60 U/L — SIGNIFICANT CHANGE UP (ref 25–170)
CO2 SERPL-SCNC: 24 MMOL/L — SIGNIFICANT CHANGE UP (ref 22–29)
CREAT SERPL-MCNC: 0.6 MG/DL — SIGNIFICANT CHANGE UP (ref 0.5–1.3)
GAS PNL BLDA: SIGNIFICANT CHANGE UP
GLUCOSE SERPL-MCNC: 128 MG/DL — HIGH (ref 70–115)
HCT VFR BLD CALC: 38.3 % — SIGNIFICANT CHANGE UP (ref 37–47)
HGB BLD-MCNC: 12.5 G/DL — SIGNIFICANT CHANGE UP (ref 12–16)
MAGNESIUM SERPL-MCNC: 2.3 MG/DL — SIGNIFICANT CHANGE UP (ref 1.8–2.6)
MCHC RBC-ENTMCNC: 30.3 PG — SIGNIFICANT CHANGE UP (ref 27–31)
MCHC RBC-ENTMCNC: 32.6 G/DL — SIGNIFICANT CHANGE UP (ref 32–36)
MCV RBC AUTO: 93 FL — SIGNIFICANT CHANGE UP (ref 81–99)
PLATELET # BLD AUTO: 271 K/UL — SIGNIFICANT CHANGE UP (ref 150–400)
POTASSIUM SERPL-MCNC: 4.6 MMOL/L — SIGNIFICANT CHANGE UP (ref 3.5–5.3)
POTASSIUM SERPL-SCNC: 4.6 MMOL/L — SIGNIFICANT CHANGE UP (ref 3.5–5.3)
RBC # BLD: 4.12 M/UL — LOW (ref 4.4–5.2)
RBC # FLD: 12.2 % — SIGNIFICANT CHANGE UP (ref 11–15.6)
SODIUM SERPL-SCNC: 136 MMOL/L — SIGNIFICANT CHANGE UP (ref 135–145)
TROPONIN T SERPL-MCNC: 0.06 NG/ML — SIGNIFICANT CHANGE UP (ref 0–0.06)
TYPE + AB SCN PNL BLD: SIGNIFICANT CHANGE UP
WBC # BLD: 20.5 K/UL — HIGH (ref 4.8–10.8)
WBC # FLD AUTO: 20.5 K/UL — HIGH (ref 4.8–10.8)

## 2018-07-10 PROCEDURE — 71045 X-RAY EXAM CHEST 1 VIEW: CPT | Mod: 26

## 2018-07-10 PROCEDURE — 93010 ELECTROCARDIOGRAM REPORT: CPT

## 2018-07-10 PROCEDURE — 99233 SBSQ HOSP IP/OBS HIGH 50: CPT

## 2018-07-10 PROCEDURE — 99232 SBSQ HOSP IP/OBS MODERATE 35: CPT

## 2018-07-10 RX ORDER — VANCOMYCIN HCL 1 G
1000 VIAL (EA) INTRAVENOUS ONCE
Qty: 0 | Refills: 0 | Status: DISCONTINUED | OUTPATIENT
Start: 2018-07-11 | End: 2018-07-11

## 2018-07-10 RX ORDER — METOPROLOL TARTRATE 50 MG
25 TABLET ORAL
Qty: 0 | Refills: 0 | Status: DISCONTINUED | OUTPATIENT
Start: 2018-07-10 | End: 2018-07-11

## 2018-07-10 RX ORDER — CEFUROXIME AXETIL 250 MG
1500 TABLET ORAL ONCE
Qty: 0 | Refills: 0 | Status: DISCONTINUED | OUTPATIENT
Start: 2018-07-11 | End: 2018-07-11

## 2018-07-10 RX ORDER — LEVALBUTEROL 1.25 MG/.5ML
1.25 SOLUTION, CONCENTRATE RESPIRATORY (INHALATION)
Qty: 0 | Refills: 0 | Status: DISCONTINUED | OUTPATIENT
Start: 2018-07-10 | End: 2018-07-11

## 2018-07-10 RX ORDER — ALPRAZOLAM 0.25 MG
0.25 TABLET ORAL ONCE
Qty: 0 | Refills: 0 | Status: DISCONTINUED | OUTPATIENT
Start: 2018-07-10 | End: 2018-07-10

## 2018-07-10 RX ORDER — MONTELUKAST 4 MG/1
10 TABLET, CHEWABLE ORAL AT BEDTIME
Qty: 0 | Refills: 0 | Status: DISCONTINUED | OUTPATIENT
Start: 2018-07-10 | End: 2018-07-11

## 2018-07-10 RX ADMIN — Medication 650 MILLIGRAM(S): at 22:42

## 2018-07-10 RX ADMIN — Medication 650 MILLIGRAM(S): at 01:30

## 2018-07-10 RX ADMIN — Medication 0.5 MILLIGRAM(S): at 21:04

## 2018-07-10 RX ADMIN — CHLORHEXIDINE GLUCONATE 15 MILLILITER(S): 213 SOLUTION TOPICAL at 05:16

## 2018-07-10 RX ADMIN — Medication 25 MILLIGRAM(S): at 18:32

## 2018-07-10 RX ADMIN — ATORVASTATIN CALCIUM 40 MILLIGRAM(S): 80 TABLET, FILM COATED ORAL at 22:12

## 2018-07-10 RX ADMIN — CHLORHEXIDINE GLUCONATE 15 MILLILITER(S): 213 SOLUTION TOPICAL at 18:32

## 2018-07-10 RX ADMIN — Medication 650 MILLIGRAM(S): at 02:30

## 2018-07-10 RX ADMIN — MONTELUKAST 10 MILLIGRAM(S): 4 TABLET, CHEWABLE ORAL at 22:12

## 2018-07-10 RX ADMIN — Medication 0.25 MILLIGRAM(S): at 01:47

## 2018-07-10 RX ADMIN — LEVALBUTEROL 1.25 MILLIGRAM(S): 1.25 SOLUTION, CONCENTRATE RESPIRATORY (INHALATION) at 16:14

## 2018-07-10 RX ADMIN — Medication 3 MILLILITER(S): at 08:50

## 2018-07-10 RX ADMIN — Medication 0.5 MILLIGRAM(S): at 08:50

## 2018-07-10 RX ADMIN — Medication 1 PATCH: at 09:07

## 2018-07-10 RX ADMIN — Medication 325 MILLIGRAM(S): at 09:06

## 2018-07-10 RX ADMIN — LEVALBUTEROL 1.25 MILLIGRAM(S): 1.25 SOLUTION, CONCENTRATE RESPIRATORY (INHALATION) at 12:04

## 2018-07-10 RX ADMIN — LEVALBUTEROL 1.25 MILLIGRAM(S): 1.25 SOLUTION, CONCENTRATE RESPIRATORY (INHALATION) at 21:04

## 2018-07-10 RX ADMIN — CHLORHEXIDINE GLUCONATE 1 APPLICATION(S): 213 SOLUTION TOPICAL at 14:00

## 2018-07-10 RX ADMIN — Medication 1 PATCH: at 09:06

## 2018-07-10 RX ADMIN — CHLORHEXIDINE GLUCONATE 1 APPLICATION(S): 213 SOLUTION TOPICAL at 21:43

## 2018-07-10 RX ADMIN — AMLODIPINE BESYLATE 5 MILLIGRAM(S): 2.5 TABLET ORAL at 05:16

## 2018-07-10 RX ADMIN — Medication 650 MILLIGRAM(S): at 09:07

## 2018-07-10 RX ADMIN — Medication 1 PATCH: at 21:43

## 2018-07-10 RX ADMIN — Medication 650 MILLIGRAM(S): at 22:12

## 2018-07-10 NOTE — PROGRESS NOTE ADULT - SUBJECTIVE AND OBJECTIVE BOX
ALEJANDRA RANDALLLOSALLISON    [id number]    73y      Female    INTERVAL HPI/OVERNIGHT EVENTS:    patient being seen for multivessel disease and COPD. Patient seen at bedside and denies any complaints    last 24 hours patient is afebrile.     REVIEW OF SYSTEMS:    CONSTITUTIONAL: No fever, weight loss, or fatigue  RESPIRATORY: No cough, wheezing, hemoptysis; No shortness of breath  CARDIOVASCULAR: No chest pain, palpitations  GASTROINTESTINAL: No abdominal or epigastric pain. No nausea, vomiting  NEUROLOGICAL: No headaches, memory loss, loss of strength.  MISCELLANEOUS:      Vital Signs Last 24 Hrs  T(C): 36.4 (10 Jul 2018 05:07), Max: 36.8 (09 Jul 2018 15:22)  T(F): 97.5 (10 Jul 2018 05:07), Max: 98.2 (09 Jul 2018 15:22)  HR: 75 (10 Jul 2018 09:21) (75 - 110)  BP: 130/72 (10 Jul 2018 07:57) (112/73 - 166/90)  BP(mean): --  RR: 18 (10 Jul 2018 05:07) (18 - 48)  SpO2: 97% (10 Jul 2018 09:21) (93% - 100%)    PHYSICAL EXAM:      	GENERAL: NAD,   	EYES: EOMI, PERRLA,   	NECK: Supple, No JVD, Normal thyroid  	NERVOUS SYSTEM:  Alert & Oriented X3,  	HEART: Regular rate and rhythm; No murmurs,  	ABDOMEN: Soft, Nontender, Nondistended;   	EXTREMITIES:  2+ Peripheral Pulses, No edema  	LYMPH: No lymphadenopathy noted  SKIN: No rashes or lesions        LABS:                        12.5   20.5  )-----------( 271      ( 10 Jul 2018 06:17 )             38.3     07-10    136  |  96<L>  |  25.0<H>  ----------------------------<  128<H>  4.6   |  24.0  |  0.60    Ca    9.5      10 Jul 2018 06:17  Mg     2.3     07-10              MEDICATIONS  (STANDING):  ALBUTerol/ipratropium for Nebulization 3 milliLiter(s) Nebulizer every 4 hours  amLODIPine   Tablet 5 milliGRAM(s) Oral daily  aspirin 325 milliGRAM(s) Oral daily  atorvastatin 40 milliGRAM(s) Oral at bedtime  buDESOnide   0.5 milliGRAM(s) Respule 0.5 milliGRAM(s) Inhalation two times a day  chlorhexidine 0.12% Liquid 15 milliLiter(s) Swish and Spit two times a day  chlorhexidine 4% Liquid 1 Application(s) Topical two times a day  enoxaparin Injectable 40 milliGRAM(s) SubCutaneous daily  levalbuterol Inhalation 1.25 milliGRAM(s) Inhalation four times a day  montelukast 10 milliGRAM(s) Oral at bedtime  nicotine - 21 mG/24Hr(s) Patch 1 patch Transdermal daily  nitroglycerin    Patch 0.1 mG/Hr(s) 1 patch Transdermal daily    MEDICATIONS  (PRN):  acetaminophen   Tablet. 650 milliGRAM(s) Oral every 6 hours PRN headache  hydrALAZINE Injectable 5 milliGRAM(s) IV Push every 6 hours PRN for sbp above 160 mmhg      RADIOLOGY & ADDITIONAL TESTS:

## 2018-07-10 NOTE — PROGRESS NOTE ADULT - ASSESSMENT
Assess    Steroid related aggitation  Mild bronchospasm  Neb related Headache and ?Ischemia  Mild emphysema  Overweight with some restriction  NSTEMI  Suspect mild GERD with mild bronchospasm  No significant pulmonary contraindication to CABG under general anesthesia      Plan    Levalbuterol/budesonide  DC steroids  Montelukast  Truncal elevation  Reduce sedation

## 2018-07-10 NOTE — PROGRESS NOTE ADULT - SUBJECTIVE AND OBJECTIVE BOX
Cardiac Surgery Pre-op Note:    CC: Patient is a 73y old  Female who presents with a chief complaint of chest pain (06 Jul 2018 12:16)                                                                                                             Surgeon: Kaleb    Procedure: CABG    Allergies    latex (Unknown)  No Known Drug Allergies    Intolerances        HPI:  73 yof with pmh of active smoker, htn and possible cad presents from home with 2 day history of mid sternal chest pain which is rated 6 out of 10 with radiation to left sided neck and is intermittent. Patients family was at bedside who provided much of the history. Patient states she was told she may have had a silent heart attack in the past. Patient also states the pain is reproducible. Patient dneies nausea, diaphoresis. Admission trop is 0.07. (06 Jul 2018 12:16)      PAST MEDICAL & SURGICAL HISTORY:  HTN (hypertension)  No significant past surgical history      MEDICATIONS  (STANDING):  ALBUTerol/ipratropium for Nebulization 3 milliLiter(s) Nebulizer every 4 hours  amLODIPine   Tablet 5 milliGRAM(s) Oral daily  aspirin 325 milliGRAM(s) Oral daily  atorvastatin 40 milliGRAM(s) Oral at bedtime  buDESOnide   0.5 milliGRAM(s) Respule 0.5 milliGRAM(s) Inhalation two times a day  chlorhexidine 0.12% Liquid 15 milliLiter(s) Swish and Spit two times a day  chlorhexidine 4% Liquid 1 Application(s) Topical two times a day  enoxaparin Injectable 40 milliGRAM(s) SubCutaneous daily  levalbuterol Inhalation 1.25 milliGRAM(s) Inhalation four times a day  montelukast 10 milliGRAM(s) Oral at bedtime  nicotine - 21 mG/24Hr(s) Patch 1 patch Transdermal daily  nitroglycerin    Patch 0.1 mG/Hr(s) 1 patch Transdermal daily    MEDICATIONS  (PRN):  acetaminophen   Tablet. 650 milliGRAM(s) Oral every 6 hours PRN headache  hydrALAZINE Injectable 5 milliGRAM(s) IV Push every 6 hours PRN for sbp above 160 mmhg        Labs:                        12.5   20.5  )-----------( 271      ( 10 Jul 2018 06:17 )             38.3     07-10    136  |  96<L>  |  25.0<H>  ----------------------------<  128<H>  4.6   |  24.0  |  0.60    Ca    9.5      10 Jul 2018 06:17  Mg     2.3     07-10            Prealbumin, Serum: 23 mg/dL (07-08 @ 05:57)                CXR:     EKG:    Carotid Duplex:      PFT's:    Echo:    Cath report:          Pt has AICD/PPm [ ] Yes  [ ] No             Brand Name:  Pre-op Beta Blocker ordered within 24 hrs of surgery?  [ ] Yes  [ ] No  If not, Why?  Type & Cross  [ ] Yes  [ ] No  NPO after Midnight [ ] Yes  [ ] No  Pre-op ABX ordered, to be taped on chart:  [ ] Yes  [ ] No   ( Vanco only if Anaphylaxis, or MRSA)  Consent obtained  [ ] Yes  [ ] No Cardiac Surgery Pre-op Note:    CC: Patient is a 73y old  Female who presents with a chief complaint of chest pain (06 Jul 2018 12:16)                                                                                                             Surgeon: Kaleb    Procedure: CABG    Allergies    latex (Unknown)  No Known Drug Allergies    Intolerances        HPI:  73 yof with pmh of active smoker, htn and possible cad presents from home with 2 day history of mid sternal chest pain which is rated 6 out of 10 with radiation to left sided neck and is intermittent. Patients family was at bedside who provided much of the history. Patient states she was told she may have had a silent heart attack in the past. Patient also states the pain is reproducible. Patient dneies nausea, diaphoresis. Admission trop is 0.07. (06 Jul 2018 12:16)      PAST MEDICAL & SURGICAL HISTORY:  HTN (hypertension)  No significant past surgical history      MEDICATIONS  (STANDING):  ALBUTerol/ipratropium for Nebulization 3 milliLiter(s) Nebulizer every 4 hours  amLODIPine   Tablet 5 milliGRAM(s) Oral daily  aspirin 325 milliGRAM(s) Oral daily  atorvastatin 40 milliGRAM(s) Oral at bedtime  buDESOnide   0.5 milliGRAM(s) Respule 0.5 milliGRAM(s) Inhalation two times a day  chlorhexidine 0.12% Liquid 15 milliLiter(s) Swish and Spit two times a day  chlorhexidine 4% Liquid 1 Application(s) Topical two times a day  enoxaparin Injectable 40 milliGRAM(s) SubCutaneous daily  levalbuterol Inhalation 1.25 milliGRAM(s) Inhalation four times a day  montelukast 10 milliGRAM(s) Oral at bedtime  nicotine - 21 mG/24Hr(s) Patch 1 patch Transdermal daily  nitroglycerin    Patch 0.1 mG/Hr(s) 1 patch Transdermal daily    MEDICATIONS  (PRN):  acetaminophen   Tablet. 650 milliGRAM(s) Oral every 6 hours PRN headache  hydrALAZINE Injectable 5 milliGRAM(s) IV Push every 6 hours PRN for sbp above 160 mmhg        Labs:                        12.5   20.5  )-----------( 271      ( 10 Jul 2018 06:17 )             38.3     07-10    136  |  96<L>  |  25.0<H>  ----------------------------<  128<H>  4.6   |  24.0  |  0.60    Ca    9.5      10 Jul 2018 06:17  Mg     2.3     07-10            Prealbumin, Serum: 23 mg/dL (07-08 @ 05:57)                CXR: < from: Xray Chest 1 View- PORTABLE-Urgent (07.10.18 @ 02:39) >  Impression:  Cardiomegaly. Clear lungs. No change since prior exam..    < end of copied text >      EKG: < from: 12 Lead ECG (07.09.18 @ 09:39) >  Ventricular Rate 109 BPM    Atrial Rate 109 BPM    P-R Interval 170 ms    QRS Duration 90 ms    Q-T Interval 352 ms    QTC Calculation(Bezet) 474 ms    P Axis 53 degrees    R Axis 6 degrees    T Axis 39 degrees    Diagnosis Line Sinus tachycardia  ST & T wave abnormality, consider anterior ischemia  Abnormal ECG    Confirmed by MARIAELENA TROTTER ([id number]) on 7/9/2018 10:04:31 AM    < end of copied text >      Carotid Duplex:  < from:  Duplex Carotid Arteries Complete, Bilateral (07.07.18 @ 10:03) >  No evidence of hemodynamically significant stenosis in the right internal   carotid artery.    < end of copied text >      PFT's: reviewed, poor study.  will attempt repeat today.     Echo: < from: TTE Echo Complete w/Doppler (07.07.18 @ 13:32) >  . Technically difficult study.   2. There is mild concentric left ventricular hypertrophy.  3. Hyperdynamic global left ventricular systolic function.   4. Left ventricular ejection fraction, by visual estimation, is 70 to   75%.   5. Spectral Doppler shows impaired relaxation pattern of left   ventricular myocardial filling (Grade I diastolic dysfunction).   6. Normal right ventricular size and function.   7. Mildly enlarged left atrium.   8. The right atrium is normal in size.   9. Sclerotic aortic valve with normal opening.  10. Normal pulmonary artery pressure.  11. Moderate sized plaque involving the ascending aorta.  12. There is no evidence of pericardial effusion.    < end of copied text >      Cath report: < from: Cardiac Cath Lab - Adult (07.06.18 @ 17:05) >  There is significant triple vessel coronary artery  disease.  Mid LAD=90%  Mid LCx=70%  Prox RCA=95%  Mid ULH=966% (Fills via collaterals from the Left system) Left ventricular  function is normal.  LVEF=70%    < end of copied text >            Pt has AICD/PPm [ ] Yes  [x ] No             Brand Name:  Pre-op Beta Blocker ordered within 24 hrs of surgery?  [x ] Yes  [ ] No  If not, Why?  Type & Cross  [x ] Yes  [ ] No  NPO after Midnight [x ] Yes  [ ] No  Pre-op ABX ordered, to be taped on chart:  [x ] Yes  [ ] No   ( Vanco only if Anaphylaxis, or MRSA)  Consent obtained  [ ] Yes  [ ] No To be obtained by Dr. Manuel

## 2018-07-10 NOTE — PROGRESS NOTE ADULT - PROBLEM SELECTOR PLAN 1
continue BB statin  transfer Dr Manuel service  NPO after midnight  xanax x1 overnight tonight for anxiety  Plan for CABG in AM

## 2018-07-10 NOTE — PROGRESS NOTE ADULT - SUBJECTIVE AND OBJECTIVE BOX
PULMONARY PROGRESS NOTE      ALEJANDRA MEJÍAN-[id number]    Patient is a 73y old  Female who presents with a chief complaint of chest pain (06 Jul 2018 12:16)  NSTEMI  Smoker  Overweight    INTERVAL HPI/OVERNIGHT EVENTS:  MIld chest pain  Somnolent and confused per her daughter after benzo for steroid related anxiety yesterday  Headache and chest pain with neb rx    MEDICATIONS  (STANDING):  ALBUTerol/ipratropium for Nebulization 3 milliLiter(s) Nebulizer every 4 hours  amLODIPine   Tablet 5 milliGRAM(s) Oral daily  aspirin 325 milliGRAM(s) Oral daily  atorvastatin 40 milliGRAM(s) Oral at bedtime  buDESOnide   0.5 milliGRAM(s) Respule 0.5 milliGRAM(s) Inhalation two times a day  chlorhexidine 0.12% Liquid 15 milliLiter(s) Swish and Spit two times a day  chlorhexidine 4% Liquid 1 Application(s) Topical two times a day  enoxaparin Injectable 40 milliGRAM(s) SubCutaneous daily  nicotine - 21 mG/24Hr(s) Patch 1 patch Transdermal daily  nitroglycerin    Patch 0.1 mG/Hr(s) 1 patch Transdermal daily      MEDICATIONS  (PRN):  acetaminophen   Tablet. 650 milliGRAM(s) Oral every 6 hours PRN headache  hydrALAZINE Injectable 5 milliGRAM(s) IV Push every 6 hours PRN for sbp above 160 mmhg  LORazepam   Injectable 0.25 milliGRAM(s) IV Push every 8 hours PRN Agitation  morphine  - Injectable 2 milliGRAM(s) IV Push every 4 hours PRN chest pain      Allergies    latex (Unknown)  No Known Drug Allergies    Intolerances        PAST MEDICAL & SURGICAL HISTORY:  HTN (hypertension)  No significant past surgical history      SOCIAL HISTORY  Smoking History:       REVIEW OF SYSTEMS:    CONSTITUTIONAL:  No distress    HEENT:  Eyes:  No diplopia or blurred vision. ENT:  No earache, sore throat or runny nose.    CARDIOVASCULAR:  No palpitations.    RESPIRATORY:  No cough, shortness of breath, PND or orthopnea. Mild SOBOE    GASTROINTESTINAL:  No nausea, vomiting or diarrhea.    GENITOURINARY:  No dysuria, frequency or urgency.    NEUROLOGIC:  No paresthesias, fasciculations, seizures or weakness.    PSYCHIATRIC:  No disorder of thought or mood.    Vital Signs Last 24 Hrs  T(C): 36.6 (09 Jul 2018 05:35), Max: 37.1 (08 Jul 2018 22:45)  T(F): 97.8 (09 Jul 2018 05:35), Max: 98.7 (08 Jul 2018 22:45)  HR: 103 (09 Jul 2018 09:24) (75 - 104)  BP: 162/90 (09 Jul 2018 09:24) (122/69 - 162/90)  BP(mean): --  RR: 17 (09 Jul 2018 09:24) (17 - 18)  SpO2: 95% (09 Jul 2018 09:24) (93% - 98%)    PHYSICAL EXAMINATION:    GENERAL: The patient is awake and alert in no apparent distress.     HEENT: Head is normocephalic and atraumatic. Extraocular muscles are intact. Mucous membranes are moist.    NECK: Supple.    LUNGS: Scattered rhonchi to auscultation without rales; respirations unlabored    HEART: Regular rate and rhythm without murmur.    ABDOMEN: Soft, nontender, and nondistended.      EXTREMITIES: Without any cyanosis, clubbing, rash, lesions or edema.    NEUROLOGIC: Grossly intact.    LABS:                        12.8   10.0  )-----------( 264      ( 08 Jul 2018 05:57 )             38.9     07-08    142  |  99  |  12.0  ----------------------------<  108  4.0   |  25.0  |  0.55    Ca    9.5      08 Jul 2018 05:57  Phos  4.0     07-08  Mg     2.3     07-08    TPro  7.4  /  Alb  4.1  /  TBili  0.6  /  DBili  0.1  /  AST  12  /  ALT  8   /  AlkPhos  101  07-08    PT/INR - ( 08 Jul 2018 05:57 )   PT: 11.6 sec;   INR: 1.05 ratio      Magnesium, Serum: 2.3 mg/dL (07.10.18 @ 06:17)       Magnesium, Serum in AM (07.10.18 @ 06:17)    Magnesium, Serum: 2.3 mg/dL    PTT - ( 08 Jul 2018 05:57 )  PTT:37.1 sec  Basic Metabolic Panel in AM (07.10.18 @ 06:17)    Sodium, Serum: 136 mmol/L    Potassium, Serum: 4.6 mmol/L    Chloride, Serum: 96 mmol/L    Carbon Dioxide, Serum: 24.0 mmol/L    Anion Gap, Serum: 16 mmol/L    Blood Urea Nitrogen, Serum: 25.0 mg/dL    Creatinine, Serum: 0.60 mg/dL    Glucose, Serum: 128 mg/dL    Calcium, Total Serum: 9.5 mg/dL           [place] from 7/7/18  Restricted - likely poor effort    RADIOLOGY & ADDITIONAL STUDIES:  \  CT of chest, abdomen and pelvis unremarkable on 7/6/18

## 2018-07-10 NOTE — CHART NOTE - NSCHARTNOTEFT_GEN_A_CORE
Called to bedside by RN and JESSICA Tello. Both stated that patient complained of chest pain and headache. According to patient and family, symptoms occur after receiving Duonebs and Pulmicort. Similar episodes occured during the day at which time ECG and cardiac enzymes had been done, both WNL. When I evaluated the patient, she appeared anxious which rapid respirations and slightly hysterical. ABG was done to evaluate for any respiratory or metabolic component, ultimately showing a slight respiratory acidosis and hypoxia for which she was placed on NC by RT. No metabolic process evident. ECG was reviewed and did not show any new ischemic changes. Cardiac enzymes and CXR were ordered. At time of re-evaluation, patient appeared better with RR unlabored and able to speak in full sentences. Anxiety was treated. Will follow up remainder of work up. VS reviewed, HD stable. Communicated with RN. Will follow up pending results.

## 2018-07-10 NOTE — PROGRESS NOTE ADULT - ASSESSMENT
1) multivessel disease-> ct surgery following  --> plan as per ct surgery  --> for cabg tomorrow    2) smoker/copd --> patch  --> advised to quit   --> plan as per pulmonary     3) htn --> stable    to be transferred to ct surgery service

## 2018-07-10 NOTE — PROGRESS NOTE ADULT - ASSESSMENT
73F hyperdynamic LV and severe TVD referred for surgical management.  Overnight events significant for anxiety possibly made worse by steroids.  Steroids since discontinued, xanax given yesterday with good result.  Pt stable, NAD.

## 2018-07-11 ENCOUNTER — APPOINTMENT (OUTPATIENT)
Dept: CARDIOTHORACIC SURGERY | Facility: HOSPITAL | Age: 73
End: 2018-07-11
Payer: MEDICAID

## 2018-07-11 DIAGNOSIS — Z87.891 PERSONAL HISTORY OF NICOTINE DEPENDENCE: ICD-10-CM

## 2018-07-11 LAB
ALBUMIN SERPL ELPH-MCNC: 3.8 G/DL — SIGNIFICANT CHANGE UP (ref 3.3–5.2)
ALP SERPL-CCNC: 62 U/L — SIGNIFICANT CHANGE UP (ref 40–120)
ALT FLD-CCNC: 13 U/L — SIGNIFICANT CHANGE UP
ANION GAP SERPL CALC-SCNC: 13 MMOL/L — SIGNIFICANT CHANGE UP (ref 5–17)
ANION GAP SERPL CALC-SCNC: 16 MMOL/L — SIGNIFICANT CHANGE UP (ref 5–17)
APTT BLD: 31.5 SEC — SIGNIFICANT CHANGE UP (ref 27.5–37.4)
AST SERPL-CCNC: 25 U/L — SIGNIFICANT CHANGE UP
BILIRUB SERPL-MCNC: 0.8 MG/DL — SIGNIFICANT CHANGE UP (ref 0.4–2)
BUN SERPL-MCNC: 17 MG/DL — SIGNIFICANT CHANGE UP (ref 8–20)
BUN SERPL-MCNC: 22 MG/DL — HIGH (ref 8–20)
CALCIUM SERPL-MCNC: 9.5 MG/DL — SIGNIFICANT CHANGE UP (ref 8.6–10.2)
CALCIUM SERPL-MCNC: 9.7 MG/DL — SIGNIFICANT CHANGE UP (ref 8.6–10.2)
CHLORIDE SERPL-SCNC: 100 MMOL/L — SIGNIFICANT CHANGE UP (ref 98–107)
CHLORIDE SERPL-SCNC: 101 MMOL/L — SIGNIFICANT CHANGE UP (ref 98–107)
CK MB CFR SERPL CALC: 20.3 NG/ML — HIGH (ref 0–6.7)
CK SERPL-CCNC: 207 U/L — HIGH (ref 25–170)
CO2 SERPL-SCNC: 24 MMOL/L — SIGNIFICANT CHANGE UP (ref 22–29)
CO2 SERPL-SCNC: 28 MMOL/L — SIGNIFICANT CHANGE UP (ref 22–29)
CREAT SERPL-MCNC: 0.59 MG/DL — SIGNIFICANT CHANGE UP (ref 0.5–1.3)
CREAT SERPL-MCNC: 0.7 MG/DL — SIGNIFICANT CHANGE UP (ref 0.5–1.3)
GAS PNL BLDA: SIGNIFICANT CHANGE UP
GLUCOSE BLDC GLUCOMTR-MCNC: 125 MG/DL — HIGH (ref 70–99)
GLUCOSE BLDC GLUCOMTR-MCNC: 134 MG/DL — HIGH (ref 70–99)
GLUCOSE BLDC GLUCOMTR-MCNC: 135 MG/DL — HIGH (ref 70–99)
GLUCOSE BLDC GLUCOMTR-MCNC: 146 MG/DL — HIGH (ref 70–99)
GLUCOSE BLDC GLUCOMTR-MCNC: 169 MG/DL — HIGH (ref 70–99)
GLUCOSE BLDC GLUCOMTR-MCNC: 193 MG/DL — HIGH (ref 70–99)
GLUCOSE SERPL-MCNC: 172 MG/DL — HIGH (ref 70–115)
GLUCOSE SERPL-MCNC: 92 MG/DL — SIGNIFICANT CHANGE UP (ref 70–115)
HCT VFR BLD CALC: 29.5 % — LOW (ref 37–47)
HCT VFR BLD CALC: 40.3 % — SIGNIFICANT CHANGE UP (ref 37–47)
HGB BLD-MCNC: 13 G/DL — SIGNIFICANT CHANGE UP (ref 12–16)
HGB BLD-MCNC: 9.5 G/DL — LOW (ref 12–16)
INR BLD: 1.21 RATIO — HIGH (ref 0.88–1.16)
MAGNESIUM SERPL-MCNC: 2.4 MG/DL — SIGNIFICANT CHANGE UP (ref 1.6–2.6)
MAGNESIUM SERPL-MCNC: 2.6 MG/DL — SIGNIFICANT CHANGE UP (ref 1.6–2.6)
MCHC RBC-ENTMCNC: 29.9 PG — SIGNIFICANT CHANGE UP (ref 27–31)
MCHC RBC-ENTMCNC: 30.7 PG — SIGNIFICANT CHANGE UP (ref 27–31)
MCHC RBC-ENTMCNC: 32.2 G/DL — SIGNIFICANT CHANGE UP (ref 32–36)
MCHC RBC-ENTMCNC: 32.3 G/DL — SIGNIFICANT CHANGE UP (ref 32–36)
MCV RBC AUTO: 92.8 FL — SIGNIFICANT CHANGE UP (ref 81–99)
MCV RBC AUTO: 95 FL — SIGNIFICANT CHANGE UP (ref 81–99)
PLATELET # BLD AUTO: 288 K/UL — SIGNIFICANT CHANGE UP (ref 150–400)
PLATELET # BLD AUTO: 292 K/UL — SIGNIFICANT CHANGE UP (ref 150–400)
POTASSIUM SERPL-MCNC: 4 MMOL/L — SIGNIFICANT CHANGE UP (ref 3.5–5.3)
POTASSIUM SERPL-MCNC: 6 MMOL/L — HIGH (ref 3.5–5.3)
POTASSIUM SERPL-SCNC: 4 MMOL/L — SIGNIFICANT CHANGE UP (ref 3.5–5.3)
POTASSIUM SERPL-SCNC: 6 MMOL/L — HIGH (ref 3.5–5.3)
PROT SERPL-MCNC: 6 G/DL — LOW (ref 6.6–8.7)
PROTHROM AB SERPL-ACNC: 13.4 SEC — HIGH (ref 9.8–12.7)
RBC # BLD: 3.18 M/UL — LOW (ref 4.4–5.2)
RBC # BLD: 4.24 M/UL — LOW (ref 4.4–5.2)
RBC # FLD: 12.6 % — SIGNIFICANT CHANGE UP (ref 11–15.6)
RBC # FLD: 12.8 % — SIGNIFICANT CHANGE UP (ref 11–15.6)
SODIUM SERPL-SCNC: 140 MMOL/L — SIGNIFICANT CHANGE UP (ref 135–145)
SODIUM SERPL-SCNC: 142 MMOL/L — SIGNIFICANT CHANGE UP (ref 135–145)
TROPONIN T SERPL-MCNC: 0.44 NG/ML — HIGH (ref 0–0.06)
WBC # BLD: 12.4 K/UL — HIGH (ref 4.8–10.8)
WBC # BLD: 17 K/UL — HIGH (ref 4.8–10.8)
WBC # FLD AUTO: 12.4 K/UL — HIGH (ref 4.8–10.8)
WBC # FLD AUTO: 17 K/UL — HIGH (ref 4.8–10.8)

## 2018-07-11 PROCEDURE — 33519 CABG ARTERY-VEIN THREE: CPT | Mod: AS

## 2018-07-11 PROCEDURE — 71045 X-RAY EXAM CHEST 1 VIEW: CPT | Mod: 26

## 2018-07-11 PROCEDURE — 33533 CABG ARTERIAL SINGLE: CPT

## 2018-07-11 PROCEDURE — 93010 ELECTROCARDIOGRAM REPORT: CPT

## 2018-07-11 PROCEDURE — 33533 CABG ARTERIAL SINGLE: CPT | Mod: AS

## 2018-07-11 PROCEDURE — 33519 CABG ARTERY-VEIN THREE: CPT

## 2018-07-11 RX ORDER — CLOPIDOGREL BISULFATE 75 MG/1
75 TABLET, FILM COATED ORAL ONCE
Qty: 0 | Refills: 0 | Status: COMPLETED | OUTPATIENT
Start: 2018-07-11 | End: 2018-07-11

## 2018-07-11 RX ORDER — ACETAMINOPHEN 500 MG
500 TABLET ORAL ONCE
Qty: 0 | Refills: 0 | Status: COMPLETED | OUTPATIENT
Start: 2018-07-11 | End: 2018-07-11

## 2018-07-11 RX ORDER — ONDANSETRON 8 MG/1
4 TABLET, FILM COATED ORAL EVERY 6 HOURS
Qty: 0 | Refills: 0 | Status: DISCONTINUED | OUTPATIENT
Start: 2018-07-11 | End: 2018-07-13

## 2018-07-11 RX ORDER — ACETAMINOPHEN 500 MG
1000 TABLET ORAL ONCE
Qty: 0 | Refills: 0 | Status: COMPLETED | OUTPATIENT
Start: 2018-07-11 | End: 2018-07-11

## 2018-07-11 RX ORDER — INSULIN HUMAN 100 [IU]/ML
2 INJECTION, SOLUTION SUBCUTANEOUS
Qty: 100 | Refills: 0 | Status: DISCONTINUED | OUTPATIENT
Start: 2018-07-11 | End: 2018-07-12

## 2018-07-11 RX ORDER — ASPIRIN/CALCIUM CARB/MAGNESIUM 324 MG
325 TABLET ORAL DAILY
Qty: 0 | Refills: 0 | Status: DISCONTINUED | OUTPATIENT
Start: 2018-07-12 | End: 2018-07-16

## 2018-07-11 RX ORDER — DEXTROSE 50 % IN WATER 50 %
25 SYRINGE (ML) INTRAVENOUS
Qty: 0 | Refills: 0 | Status: DISCONTINUED | OUTPATIENT
Start: 2018-07-11 | End: 2018-07-14

## 2018-07-11 RX ORDER — CHLORHEXIDINE GLUCONATE 213 G/1000ML
5 SOLUTION TOPICAL EVERY 4 HOURS
Qty: 0 | Refills: 0 | Status: DISCONTINUED | OUTPATIENT
Start: 2018-07-11 | End: 2018-07-11

## 2018-07-11 RX ORDER — SODIUM CHLORIDE 9 MG/ML
1000 INJECTION INTRAMUSCULAR; INTRAVENOUS; SUBCUTANEOUS
Qty: 0 | Refills: 0 | Status: DISCONTINUED | OUTPATIENT
Start: 2018-07-11 | End: 2018-07-14

## 2018-07-11 RX ORDER — DOCUSATE SODIUM 100 MG
100 CAPSULE ORAL THREE TIMES A DAY
Qty: 0 | Refills: 0 | Status: DISCONTINUED | OUTPATIENT
Start: 2018-07-11 | End: 2018-07-14

## 2018-07-11 RX ORDER — VANCOMYCIN HCL 1 G
1000 VIAL (EA) INTRAVENOUS EVERY 12 HOURS
Qty: 0 | Refills: 0 | Status: COMPLETED | OUTPATIENT
Start: 2018-07-11 | End: 2018-07-13

## 2018-07-11 RX ORDER — SODIUM CHLORIDE 9 MG/ML
500 INJECTION, SOLUTION INTRAVENOUS ONCE
Qty: 0 | Refills: 0 | Status: COMPLETED | OUTPATIENT
Start: 2018-07-11 | End: 2018-07-11

## 2018-07-11 RX ORDER — FENTANYL CITRATE 50 UG/ML
50 INJECTION INTRAVENOUS ONCE
Qty: 0 | Refills: 0 | Status: DISCONTINUED | OUTPATIENT
Start: 2018-07-11 | End: 2018-07-11

## 2018-07-11 RX ORDER — PANTOPRAZOLE SODIUM 20 MG/1
40 TABLET, DELAYED RELEASE ORAL ONCE
Qty: 0 | Refills: 0 | Status: COMPLETED | OUTPATIENT
Start: 2018-07-11 | End: 2018-07-11

## 2018-07-11 RX ORDER — POTASSIUM CHLORIDE 20 MEQ
10 PACKET (EA) ORAL
Qty: 0 | Refills: 0 | Status: COMPLETED | OUTPATIENT
Start: 2018-07-11 | End: 2018-07-11

## 2018-07-11 RX ORDER — NOREPINEPHRINE BITARTRATE/D5W 8 MG/250ML
0.05 PLASTIC BAG, INJECTION (ML) INTRAVENOUS
Qty: 8 | Refills: 0 | Status: DISCONTINUED | OUTPATIENT
Start: 2018-07-11 | End: 2018-07-11

## 2018-07-11 RX ORDER — CLOPIDOGREL BISULFATE 75 MG/1
75 TABLET, FILM COATED ORAL DAILY
Qty: 0 | Refills: 0 | Status: DISCONTINUED | OUTPATIENT
Start: 2018-07-12 | End: 2018-07-16

## 2018-07-11 RX ORDER — POTASSIUM CHLORIDE 20 MEQ
10 PACKET (EA) ORAL
Qty: 0 | Refills: 0 | Status: DISCONTINUED | OUTPATIENT
Start: 2018-07-11 | End: 2018-07-13

## 2018-07-11 RX ORDER — ALBUMIN HUMAN 25 %
250 VIAL (ML) INTRAVENOUS
Qty: 0 | Refills: 0 | Status: COMPLETED | OUTPATIENT
Start: 2018-07-11 | End: 2018-07-11

## 2018-07-11 RX ORDER — FENTANYL CITRATE 50 UG/ML
12.5 INJECTION INTRAVENOUS ONCE
Qty: 0 | Refills: 0 | Status: DISCONTINUED | OUTPATIENT
Start: 2018-07-11 | End: 2018-07-11

## 2018-07-11 RX ORDER — SENNA PLUS 8.6 MG/1
2 TABLET ORAL AT BEDTIME
Qty: 0 | Refills: 0 | Status: DISCONTINUED | OUTPATIENT
Start: 2018-07-11 | End: 2018-07-14

## 2018-07-11 RX ORDER — FENTANYL CITRATE 50 UG/ML
25 INJECTION INTRAVENOUS ONCE
Qty: 0 | Refills: 0 | Status: DISCONTINUED | OUTPATIENT
Start: 2018-07-11 | End: 2018-07-11

## 2018-07-11 RX ORDER — FENTANYL CITRATE 50 UG/ML
50 INJECTION INTRAVENOUS
Qty: 0 | Refills: 0 | Status: DISCONTINUED | OUTPATIENT
Start: 2018-07-11 | End: 2018-07-11

## 2018-07-11 RX ORDER — IPRATROPIUM/ALBUTEROL SULFATE 18-103MCG
3 AEROSOL WITH ADAPTER (GRAM) INHALATION EVERY 4 HOURS
Qty: 0 | Refills: 0 | Status: DISCONTINUED | OUTPATIENT
Start: 2018-07-11 | End: 2018-07-12

## 2018-07-11 RX ORDER — IPRATROPIUM/ALBUTEROL SULFATE 18-103MCG
3 AEROSOL WITH ADAPTER (GRAM) INHALATION EVERY 6 HOURS
Qty: 0 | Refills: 0 | Status: DISCONTINUED | OUTPATIENT
Start: 2018-07-11 | End: 2018-07-11

## 2018-07-11 RX ORDER — MEPERIDINE HYDROCHLORIDE 50 MG/ML
25 INJECTION INTRAMUSCULAR; INTRAVENOUS; SUBCUTANEOUS ONCE
Qty: 0 | Refills: 0 | Status: DISCONTINUED | OUTPATIENT
Start: 2018-07-11 | End: 2018-07-12

## 2018-07-11 RX ORDER — SODIUM CHLORIDE 9 MG/ML
500 INJECTION, SOLUTION INTRAVENOUS ONCE
Qty: 0 | Refills: 0 | Status: DISCONTINUED | OUTPATIENT
Start: 2018-07-11 | End: 2018-07-12

## 2018-07-11 RX ORDER — PANTOPRAZOLE SODIUM 20 MG/1
40 TABLET, DELAYED RELEASE ORAL DAILY
Qty: 0 | Refills: 0 | Status: DISCONTINUED | OUTPATIENT
Start: 2018-07-12 | End: 2018-07-16

## 2018-07-11 RX ORDER — DEXTROSE 50 % IN WATER 50 %
50 SYRINGE (ML) INTRAVENOUS
Qty: 0 | Refills: 0 | Status: DISCONTINUED | OUTPATIENT
Start: 2018-07-11 | End: 2018-07-14

## 2018-07-11 RX ORDER — ACETAMINOPHEN 500 MG
1000 TABLET ORAL ONCE
Qty: 0 | Refills: 0 | Status: DISCONTINUED | OUTPATIENT
Start: 2018-07-11 | End: 2018-07-11

## 2018-07-11 RX ORDER — POLYETHYLENE GLYCOL 3350 17 G/17G
17 POWDER, FOR SOLUTION ORAL DAILY
Qty: 0 | Refills: 0 | Status: DISCONTINUED | OUTPATIENT
Start: 2018-07-13 | End: 2018-07-14

## 2018-07-11 RX ORDER — ALBUMIN HUMAN 25 %
250 VIAL (ML) INTRAVENOUS ONCE
Qty: 0 | Refills: 0 | Status: COMPLETED | OUTPATIENT
Start: 2018-07-11 | End: 2018-07-11

## 2018-07-11 RX ORDER — CEFUROXIME AXETIL 250 MG
1500 TABLET ORAL EVERY 8 HOURS
Qty: 0 | Refills: 0 | Status: COMPLETED | OUTPATIENT
Start: 2018-07-11 | End: 2018-07-13

## 2018-07-11 RX ORDER — NICARDIPINE HYDROCHLORIDE 30 MG/1
5 CAPSULE, EXTENDED RELEASE ORAL
Qty: 40 | Refills: 0 | Status: DISCONTINUED | OUTPATIENT
Start: 2018-07-11 | End: 2018-07-12

## 2018-07-11 RX ORDER — ATORVASTATIN CALCIUM 80 MG/1
40 TABLET, FILM COATED ORAL AT BEDTIME
Qty: 0 | Refills: 0 | Status: DISCONTINUED | OUTPATIENT
Start: 2018-07-11 | End: 2018-07-16

## 2018-07-11 RX ORDER — ALBUMIN HUMAN 25 %
250 VIAL (ML) INTRAVENOUS ONCE
Qty: 0 | Refills: 0 | Status: DISCONTINUED | OUTPATIENT
Start: 2018-07-11 | End: 2018-07-12

## 2018-07-11 RX ORDER — ASPIRIN/CALCIUM CARB/MAGNESIUM 324 MG
325 TABLET ORAL ONCE
Qty: 0 | Refills: 0 | Status: COMPLETED | OUTPATIENT
Start: 2018-07-11 | End: 2018-07-11

## 2018-07-11 RX ORDER — HYDROCORTISONE 20 MG
100 TABLET ORAL EVERY 8 HOURS
Qty: 0 | Refills: 0 | Status: COMPLETED | OUTPATIENT
Start: 2018-07-11 | End: 2018-07-12

## 2018-07-11 RX ORDER — IPRATROPIUM/ALBUTEROL SULFATE 18-103MCG
3 AEROSOL WITH ADAPTER (GRAM) INHALATION ONCE
Qty: 0 | Refills: 0 | Status: COMPLETED | OUTPATIENT
Start: 2018-07-11 | End: 2018-07-11

## 2018-07-11 RX ADMIN — SODIUM CHLORIDE 2000 MILLILITER(S): 9 INJECTION, SOLUTION INTRAVENOUS at 15:00

## 2018-07-11 RX ADMIN — ATORVASTATIN CALCIUM 40 MILLIGRAM(S): 80 TABLET, FILM COATED ORAL at 20:43

## 2018-07-11 RX ADMIN — FENTANYL CITRATE 50 MICROGRAM(S): 50 INJECTION INTRAVENOUS at 18:00

## 2018-07-11 RX ADMIN — Medication 125 MILLILITER(S): at 19:40

## 2018-07-11 RX ADMIN — Medication 100 MILLIGRAM(S): at 21:49

## 2018-07-11 RX ADMIN — Medication 3 MILLILITER(S): at 21:25

## 2018-07-11 RX ADMIN — PANTOPRAZOLE SODIUM 40 MILLIGRAM(S): 20 TABLET, DELAYED RELEASE ORAL at 15:17

## 2018-07-11 RX ADMIN — FENTANYL CITRATE 25 MICROGRAM(S): 50 INJECTION INTRAVENOUS at 15:53

## 2018-07-11 RX ADMIN — Medication 100 MILLIEQUIVALENT(S): at 22:30

## 2018-07-11 RX ADMIN — CHLORHEXIDINE GLUCONATE 1 APPLICATION(S): 213 SOLUTION TOPICAL at 05:22

## 2018-07-11 RX ADMIN — Medication 125 MILLILITER(S): at 23:11

## 2018-07-11 RX ADMIN — Medication 100 MILLIEQUIVALENT(S): at 23:25

## 2018-07-11 RX ADMIN — SODIUM CHLORIDE 2000 MILLILITER(S): 9 INJECTION, SOLUTION INTRAVENOUS at 17:15

## 2018-07-11 RX ADMIN — FENTANYL CITRATE 25 MICROGRAM(S): 50 INJECTION INTRAVENOUS at 17:30

## 2018-07-11 RX ADMIN — FENTANYL CITRATE 25 MICROGRAM(S): 50 INJECTION INTRAVENOUS at 17:15

## 2018-07-11 RX ADMIN — Medication 125 MILLILITER(S): at 20:00

## 2018-07-11 RX ADMIN — Medication 1000 MILLIGRAM(S): at 18:55

## 2018-07-11 RX ADMIN — FENTANYL CITRATE 25 MICROGRAM(S): 50 INJECTION INTRAVENOUS at 15:38

## 2018-07-11 RX ADMIN — Medication 250 MILLIGRAM(S): at 20:43

## 2018-07-11 RX ADMIN — CHLORHEXIDINE GLUCONATE 5 MILLILITER(S): 213 SOLUTION TOPICAL at 18:24

## 2018-07-11 RX ADMIN — Medication 6.38 MICROGRAM(S)/KG/MIN: at 15:16

## 2018-07-11 RX ADMIN — FENTANYL CITRATE 50 MICROGRAM(S): 50 INJECTION INTRAVENOUS at 17:45

## 2018-07-11 RX ADMIN — Medication 100 MILLIEQUIVALENT(S): at 15:16

## 2018-07-11 RX ADMIN — INSULIN HUMAN 2 UNIT(S)/HR: 100 INJECTION, SOLUTION SUBCUTANEOUS at 15:16

## 2018-07-11 RX ADMIN — Medication 500 MILLIGRAM(S): at 23:00

## 2018-07-11 RX ADMIN — Medication 100 MILLIEQUIVALENT(S): at 16:39

## 2018-07-11 RX ADMIN — Medication 400 MILLIGRAM(S): at 18:35

## 2018-07-11 RX ADMIN — Medication 100 MILLIGRAM(S): at 15:30

## 2018-07-11 RX ADMIN — Medication 200 MILLIGRAM(S): at 22:30

## 2018-07-11 RX ADMIN — Medication 25 MILLIGRAM(S): at 05:22

## 2018-07-11 RX ADMIN — Medication 325 MILLIGRAM(S): at 20:43

## 2018-07-11 RX ADMIN — AMLODIPINE BESYLATE 5 MILLIGRAM(S): 2.5 TABLET ORAL at 05:22

## 2018-07-11 RX ADMIN — Medication 100 MILLIEQUIVALENT(S): at 22:16

## 2018-07-11 RX ADMIN — SODIUM CHLORIDE 1000 MILLILITER(S): 9 INJECTION, SOLUTION INTRAVENOUS at 17:00

## 2018-07-11 RX ADMIN — CHLORHEXIDINE GLUCONATE 15 MILLILITER(S): 213 SOLUTION TOPICAL at 05:22

## 2018-07-11 RX ADMIN — SODIUM CHLORIDE 2000 MILLILITER(S): 9 INJECTION, SOLUTION INTRAVENOUS at 15:30

## 2018-07-11 RX ADMIN — NICARDIPINE HYDROCHLORIDE 25 MG/HR: 30 CAPSULE, EXTENDED RELEASE ORAL at 15:16

## 2018-07-11 RX ADMIN — CLOPIDOGREL BISULFATE 75 MILLIGRAM(S): 75 TABLET, FILM COATED ORAL at 20:43

## 2018-07-11 NOTE — AIRWAY REMOVAL NOTE  ADULT & PEDS - RESPIRATORY RHYTHM/PATTERN
nasal flaring/no shortness of breath/unlabored/pattern regular/patient in pain/depth regular/rate regular

## 2018-07-11 NOTE — AIRWAY REMOVAL NOTE  ADULT & PEDS - ARTIFICAL AIRWAY REMOVAL COMMENTS
patient extubated no complications.  Post extubation patient slightly tachypnic , complaining of severe pain

## 2018-07-11 NOTE — BRIEF OPERATIVE NOTE - PRE-OP DX
Coronary artery disease of native artery of native heart with stable angina pectoris  07/11/2018    Active  Erickson Carrillo

## 2018-07-11 NOTE — BRIEF OPERATIVE NOTE - PROCEDURE
<<-----Click on this checkbox to enter Procedure CABG, using internal thoracic artery and endoscopic vein procurement, adult  07/11/2018  Coronary artery bypass grafting x3. LIMA-LAD, SVG-OM1, SVG-PDA.  Active  APANETTA1 CABG, using internal thoracic artery and endoscopic vein procurement, adult  07/11/2018  Coronary artery bypass grafting x4. LIMA-LAD, SVG-OM1, SVG seq RCA and PDA.  Active  Erickson Carrillo D

## 2018-07-12 DIAGNOSIS — R73.9 HYPERGLYCEMIA, UNSPECIFIED: ICD-10-CM

## 2018-07-12 DIAGNOSIS — I10 ESSENTIAL (PRIMARY) HYPERTENSION: ICD-10-CM

## 2018-07-12 DIAGNOSIS — E87.3 ALKALOSIS: ICD-10-CM

## 2018-07-12 DIAGNOSIS — Z95.1 PRESENCE OF AORTOCORONARY BYPASS GRAFT: ICD-10-CM

## 2018-07-12 DIAGNOSIS — R06.89 OTHER ABNORMALITIES OF BREATHING: ICD-10-CM

## 2018-07-12 LAB
ALBUMIN SERPL ELPH-MCNC: 4.2 G/DL — SIGNIFICANT CHANGE UP (ref 3.3–5.2)
ALP SERPL-CCNC: 50 U/L — SIGNIFICANT CHANGE UP (ref 40–120)
ALT FLD-CCNC: 12 U/L — SIGNIFICANT CHANGE UP
ANION GAP SERPL CALC-SCNC: 15 MMOL/L — SIGNIFICANT CHANGE UP (ref 5–17)
APTT BLD: 27.2 SEC — LOW (ref 27.5–37.4)
AST SERPL-CCNC: 26 U/L — SIGNIFICANT CHANGE UP
BILIRUB SERPL-MCNC: 1.4 MG/DL — SIGNIFICANT CHANGE UP (ref 0.4–2)
BUN SERPL-MCNC: 12 MG/DL — SIGNIFICANT CHANGE UP (ref 8–20)
CALCIUM SERPL-MCNC: 8.7 MG/DL — SIGNIFICANT CHANGE UP (ref 8.6–10.2)
CHLORIDE SERPL-SCNC: 98 MMOL/L — SIGNIFICANT CHANGE UP (ref 98–107)
CK MB CFR SERPL CALC: 12.1 NG/ML — HIGH (ref 0–6.7)
CK SERPL-CCNC: 296 U/L — HIGH (ref 25–170)
CO2 SERPL-SCNC: 26 MMOL/L — SIGNIFICANT CHANGE UP (ref 22–29)
CREAT SERPL-MCNC: 0.4 MG/DL — LOW (ref 0.5–1.3)
GAS PNL BLDA: SIGNIFICANT CHANGE UP
GLUCOSE BLDC GLUCOMTR-MCNC: 112 MG/DL — HIGH (ref 70–99)
GLUCOSE BLDC GLUCOMTR-MCNC: 121 MG/DL — HIGH (ref 70–99)
GLUCOSE BLDC GLUCOMTR-MCNC: 121 MG/DL — HIGH (ref 70–99)
GLUCOSE BLDC GLUCOMTR-MCNC: 123 MG/DL — HIGH (ref 70–99)
GLUCOSE BLDC GLUCOMTR-MCNC: 125 MG/DL — HIGH (ref 70–99)
GLUCOSE BLDC GLUCOMTR-MCNC: 127 MG/DL — HIGH (ref 70–99)
GLUCOSE BLDC GLUCOMTR-MCNC: 135 MG/DL — HIGH (ref 70–99)
GLUCOSE BLDC GLUCOMTR-MCNC: 136 MG/DL — HIGH (ref 70–99)
GLUCOSE BLDC GLUCOMTR-MCNC: 143 MG/DL — HIGH (ref 70–99)
GLUCOSE BLDC GLUCOMTR-MCNC: 154 MG/DL — HIGH (ref 70–99)
GLUCOSE BLDC GLUCOMTR-MCNC: 186 MG/DL — HIGH (ref 70–99)
GLUCOSE BLDC GLUCOMTR-MCNC: 85 MG/DL — SIGNIFICANT CHANGE UP (ref 70–99)
GLUCOSE BLDC GLUCOMTR-MCNC: 92 MG/DL — SIGNIFICANT CHANGE UP (ref 70–99)
GLUCOSE BLDC GLUCOMTR-MCNC: 98 MG/DL — SIGNIFICANT CHANGE UP (ref 70–99)
GLUCOSE SERPL-MCNC: 120 MG/DL — HIGH (ref 70–115)
HCT VFR BLD CALC: 25.1 % — LOW (ref 37–47)
HGB BLD-MCNC: 8.2 G/DL — LOW (ref 12–16)
INR BLD: 1.09 RATIO — SIGNIFICANT CHANGE UP (ref 0.88–1.16)
MAGNESIUM SERPL-MCNC: 2.1 MG/DL — SIGNIFICANT CHANGE UP (ref 1.6–2.6)
MCHC RBC-ENTMCNC: 30.8 PG — SIGNIFICANT CHANGE UP (ref 27–31)
MCHC RBC-ENTMCNC: 32.7 G/DL — SIGNIFICANT CHANGE UP (ref 32–36)
MCV RBC AUTO: 94.4 FL — SIGNIFICANT CHANGE UP (ref 81–99)
PLATELET # BLD AUTO: 290 K/UL — SIGNIFICANT CHANGE UP (ref 150–400)
POTASSIUM SERPL-MCNC: 3.9 MMOL/L — SIGNIFICANT CHANGE UP (ref 3.5–5.3)
POTASSIUM SERPL-SCNC: 3.9 MMOL/L — SIGNIFICANT CHANGE UP (ref 3.5–5.3)
PROT SERPL-MCNC: 6.3 G/DL — LOW (ref 6.6–8.7)
PROTHROM AB SERPL-ACNC: 12 SEC — SIGNIFICANT CHANGE UP (ref 9.8–12.7)
RBC # BLD: 2.66 M/UL — LOW (ref 4.4–5.2)
RBC # FLD: 12.3 % — SIGNIFICANT CHANGE UP (ref 11–15.6)
SODIUM SERPL-SCNC: 139 MMOL/L — SIGNIFICANT CHANGE UP (ref 135–145)
TROPONIN T SERPL-MCNC: 0.26 NG/ML — HIGH (ref 0–0.06)
WBC # BLD: 14.6 K/UL — HIGH (ref 4.8–10.8)
WBC # FLD AUTO: 14.6 K/UL — HIGH (ref 4.8–10.8)

## 2018-07-12 PROCEDURE — 71045 X-RAY EXAM CHEST 1 VIEW: CPT | Mod: 26

## 2018-07-12 PROCEDURE — 99233 SBSQ HOSP IP/OBS HIGH 50: CPT

## 2018-07-12 PROCEDURE — 93010 ELECTROCARDIOGRAM REPORT: CPT

## 2018-07-12 RX ORDER — SODIUM CHLORIDE 9 MG/ML
1000 INJECTION INTRAMUSCULAR; INTRAVENOUS; SUBCUTANEOUS
Qty: 0 | Refills: 0 | Status: DISCONTINUED | OUTPATIENT
Start: 2018-07-12 | End: 2018-07-14

## 2018-07-12 RX ORDER — THIAMINE MONONITRATE (VIT B1) 100 MG
100 TABLET ORAL DAILY
Qty: 0 | Refills: 0 | Status: DISCONTINUED | OUTPATIENT
Start: 2018-07-12 | End: 2018-07-16

## 2018-07-12 RX ORDER — ACETAMINOPHEN 500 MG
1000 TABLET ORAL ONCE
Qty: 0 | Refills: 0 | Status: COMPLETED | OUTPATIENT
Start: 2018-07-12 | End: 2018-07-12

## 2018-07-12 RX ORDER — ENOXAPARIN SODIUM 100 MG/ML
40 INJECTION SUBCUTANEOUS DAILY
Qty: 0 | Refills: 0 | Status: DISCONTINUED | OUTPATIENT
Start: 2018-07-12 | End: 2018-07-16

## 2018-07-12 RX ORDER — METOPROLOL TARTRATE 50 MG
50 TABLET ORAL EVERY 12 HOURS
Qty: 0 | Refills: 0 | Status: DISCONTINUED | OUTPATIENT
Start: 2018-07-13 | End: 2018-07-16

## 2018-07-12 RX ORDER — LEVALBUTEROL 1.25 MG/.5ML
1.25 SOLUTION, CONCENTRATE RESPIRATORY (INHALATION) EVERY 6 HOURS
Qty: 0 | Refills: 0 | Status: DISCONTINUED | OUTPATIENT
Start: 2018-07-12 | End: 2018-07-12

## 2018-07-12 RX ORDER — IPRATROPIUM BROMIDE 0.2 MG/ML
500 SOLUTION, NON-ORAL INHALATION EVERY 6 HOURS
Qty: 0 | Refills: 0 | Status: DISCONTINUED | OUTPATIENT
Start: 2018-07-12 | End: 2018-07-12

## 2018-07-12 RX ORDER — SODIUM CHLORIDE 9 MG/ML
1000 INJECTION, SOLUTION INTRAVENOUS
Qty: 0 | Refills: 0 | Status: DISCONTINUED | OUTPATIENT
Start: 2018-07-12 | End: 2018-07-14

## 2018-07-12 RX ORDER — FERROUS SULFATE 325(65) MG
325 TABLET ORAL THREE TIMES A DAY
Qty: 0 | Refills: 0 | Status: DISCONTINUED | OUTPATIENT
Start: 2018-07-12 | End: 2018-07-16

## 2018-07-12 RX ORDER — FENTANYL CITRATE 50 UG/ML
12.5 INJECTION INTRAVENOUS ONCE
Qty: 0 | Refills: 0 | Status: DISCONTINUED | OUTPATIENT
Start: 2018-07-12 | End: 2018-07-12

## 2018-07-12 RX ORDER — IPRATROPIUM/ALBUTEROL SULFATE 18-103MCG
3 AEROSOL WITH ADAPTER (GRAM) INHALATION EVERY 6 HOURS
Qty: 0 | Refills: 0 | Status: DISCONTINUED | OUTPATIENT
Start: 2018-07-12 | End: 2018-07-16

## 2018-07-12 RX ORDER — GLUCAGON INJECTION, SOLUTION 0.5 MG/.1ML
1 INJECTION, SOLUTION SUBCUTANEOUS ONCE
Qty: 0 | Refills: 0 | Status: DISCONTINUED | OUTPATIENT
Start: 2018-07-12 | End: 2018-07-14

## 2018-07-12 RX ORDER — NITROGLYCERIN 6.5 MG
10 CAPSULE, EXTENDED RELEASE ORAL
Qty: 50 | Refills: 0 | Status: DISCONTINUED | OUTPATIENT
Start: 2018-07-12 | End: 2018-07-13

## 2018-07-12 RX ORDER — INSULIN LISPRO 100/ML
2 VIAL (ML) SUBCUTANEOUS
Qty: 0 | Refills: 0 | Status: DISCONTINUED | OUTPATIENT
Start: 2018-07-12 | End: 2018-07-12

## 2018-07-12 RX ORDER — FOLIC ACID 0.8 MG
1 TABLET ORAL DAILY
Qty: 0 | Refills: 0 | Status: DISCONTINUED | OUTPATIENT
Start: 2018-07-12 | End: 2018-07-16

## 2018-07-12 RX ORDER — METOPROLOL TARTRATE 50 MG
25 TABLET ORAL ONCE
Qty: 0 | Refills: 0 | Status: COMPLETED | OUTPATIENT
Start: 2018-07-12 | End: 2018-07-12

## 2018-07-12 RX ORDER — HYDROMORPHONE HYDROCHLORIDE 2 MG/ML
0.5 INJECTION INTRAMUSCULAR; INTRAVENOUS; SUBCUTANEOUS ONCE
Qty: 0 | Refills: 0 | Status: DISCONTINUED | OUTPATIENT
Start: 2018-07-12 | End: 2018-07-13

## 2018-07-12 RX ORDER — POTASSIUM CHLORIDE 20 MEQ
10 PACKET (EA) ORAL
Qty: 0 | Refills: 0 | Status: COMPLETED | OUTPATIENT
Start: 2018-07-12 | End: 2018-07-12

## 2018-07-12 RX ORDER — BUDESONIDE, MICRONIZED 100 %
0.5 POWDER (GRAM) MISCELLANEOUS EVERY 12 HOURS
Qty: 0 | Refills: 0 | Status: DISCONTINUED | OUTPATIENT
Start: 2018-07-12 | End: 2018-07-16

## 2018-07-12 RX ORDER — METOPROLOL TARTRATE 50 MG
25 TABLET ORAL
Qty: 0 | Refills: 0 | Status: DISCONTINUED | OUTPATIENT
Start: 2018-07-12 | End: 2018-07-12

## 2018-07-12 RX ORDER — DEXTROSE 50 % IN WATER 50 %
15 SYRINGE (ML) INTRAVENOUS ONCE
Qty: 0 | Refills: 0 | Status: DISCONTINUED | OUTPATIENT
Start: 2018-07-12 | End: 2018-07-14

## 2018-07-12 RX ORDER — INSULIN LISPRO 100/ML
VIAL (ML) SUBCUTANEOUS
Qty: 0 | Refills: 0 | Status: DISCONTINUED | OUTPATIENT
Start: 2018-07-12 | End: 2018-07-13

## 2018-07-12 RX ADMIN — Medication 325 MILLIGRAM(S): at 13:22

## 2018-07-12 RX ADMIN — Medication 3 MILLILITER(S): at 20:16

## 2018-07-12 RX ADMIN — Medication 2 UNIT(S): at 16:33

## 2018-07-12 RX ADMIN — Medication 3 MILLILITER(S): at 15:18

## 2018-07-12 RX ADMIN — FENTANYL CITRATE 12.5 MICROGRAM(S): 50 INJECTION INTRAVENOUS at 04:47

## 2018-07-12 RX ADMIN — Medication 50 MILLIEQUIVALENT(S): at 05:46

## 2018-07-12 RX ADMIN — Medication 100 MILLIGRAM(S): at 05:17

## 2018-07-12 RX ADMIN — Medication 25 MILLIGRAM(S): at 16:38

## 2018-07-12 RX ADMIN — FENTANYL CITRATE 12.5 MICROGRAM(S): 50 INJECTION INTRAVENOUS at 00:16

## 2018-07-12 RX ADMIN — Medication 100 MILLIGRAM(S): at 13:22

## 2018-07-12 RX ADMIN — Medication 325 MILLIGRAM(S): at 21:28

## 2018-07-12 RX ADMIN — Medication 25 MILLIGRAM(S): at 19:10

## 2018-07-12 RX ADMIN — Medication 1000 MILLIGRAM(S): at 04:47

## 2018-07-12 RX ADMIN — FENTANYL CITRATE 12.5 MICROGRAM(S): 50 INJECTION INTRAVENOUS at 04:36

## 2018-07-12 RX ADMIN — Medication 2 UNIT(S): at 09:12

## 2018-07-12 RX ADMIN — PANTOPRAZOLE SODIUM 40 MILLIGRAM(S): 20 TABLET, DELAYED RELEASE ORAL at 13:21

## 2018-07-12 RX ADMIN — Medication 1 TABLET(S): at 13:22

## 2018-07-12 RX ADMIN — ATORVASTATIN CALCIUM 40 MILLIGRAM(S): 80 TABLET, FILM COATED ORAL at 21:28

## 2018-07-12 RX ADMIN — Medication 100 MILLIGRAM(S): at 00:34

## 2018-07-12 RX ADMIN — Medication 250 MILLIGRAM(S): at 09:12

## 2018-07-12 RX ADMIN — Medication 400 MILLIGRAM(S): at 12:30

## 2018-07-12 RX ADMIN — Medication 100 MILLIGRAM(S): at 13:21

## 2018-07-12 RX ADMIN — FENTANYL CITRATE 12.5 MICROGRAM(S): 50 INJECTION INTRAVENOUS at 16:45

## 2018-07-12 RX ADMIN — Medication 25 MILLIGRAM(S): at 05:17

## 2018-07-12 RX ADMIN — FENTANYL CITRATE 12.5 MICROGRAM(S): 50 INJECTION INTRAVENOUS at 00:29

## 2018-07-12 RX ADMIN — Medication 400 MILLIGRAM(S): at 04:36

## 2018-07-12 RX ADMIN — Medication 325 MILLIGRAM(S): at 13:21

## 2018-07-12 RX ADMIN — Medication 100 MILLIGRAM(S): at 09:12

## 2018-07-12 RX ADMIN — Medication 100 MILLIGRAM(S): at 13:23

## 2018-07-12 RX ADMIN — Medication 0.5 MILLIGRAM(S): at 20:16

## 2018-07-12 RX ADMIN — SENNA PLUS 2 TABLET(S): 8.6 TABLET ORAL at 21:28

## 2018-07-12 RX ADMIN — Medication 3 MILLILITER(S): at 00:07

## 2018-07-12 RX ADMIN — ENOXAPARIN SODIUM 40 MILLIGRAM(S): 100 INJECTION SUBCUTANEOUS at 13:21

## 2018-07-12 RX ADMIN — FENTANYL CITRATE 12.5 MICROGRAM(S): 50 INJECTION INTRAVENOUS at 16:30

## 2018-07-12 RX ADMIN — Medication 3 MILLILITER(S): at 04:00

## 2018-07-12 RX ADMIN — CLOPIDOGREL BISULFATE 75 MILLIGRAM(S): 75 TABLET, FILM COATED ORAL at 13:22

## 2018-07-12 RX ADMIN — Medication 50 MILLIEQUIVALENT(S): at 05:15

## 2018-07-12 RX ADMIN — Medication 1000 MILLIGRAM(S): at 12:45

## 2018-07-12 RX ADMIN — Medication 100 MILLIGRAM(S): at 16:34

## 2018-07-12 RX ADMIN — Medication 250 MILLIGRAM(S): at 20:28

## 2018-07-12 RX ADMIN — Medication 1 MILLIGRAM(S): at 13:21

## 2018-07-12 RX ADMIN — Medication 50 MILLIEQUIVALENT(S): at 04:49

## 2018-07-12 RX ADMIN — Medication 100 MILLIGRAM(S): at 21:28

## 2018-07-12 RX ADMIN — Medication 2 UNIT(S): at 11:53

## 2018-07-12 NOTE — PROGRESS NOTE ADULT - PROBLEM SELECTOR PROBLEM 2
Coronary artery disease involving native coronary artery of native heart with unstable angina pectoris NSTEMI (non-ST elevated myocardial infarction)

## 2018-07-12 NOTE — PHYSICAL THERAPY INITIAL EVALUATION ADULT - IMPAIRMENTS FOUND, PT EVAL
gait, locomotion, and balance/muscle strength/aerobic capacity/endurance
aerobic capacity/endurance/gait, locomotion, and balance

## 2018-07-12 NOTE — PROGRESS NOTE ADULT - PROBLEM SELECTOR PLAN 1
Preop workup in progress.  CABG potentially Wednesday Titrate NTG gtt as tolerated  Beta blocker as tolerated by HR and SBP  Lipitor for chronic graft patency prophylaxis  ASA and Plavix for acute graft closure prophylaxis  Encourage PO intake  Encourage OOB to chair and ambulation with PT  Encourage deep breathing exercised and coughing  Chest PT and IS use with bedside nurse

## 2018-07-12 NOTE — DIETITIAN INITIAL EVALUATION ADULT. - NS AS NUTRI INTERV ED CONTENT
Purpose of the nutrition education/Nutrition relationship to health/disease/Heart healthy diet literature provided/Priority modifications

## 2018-07-12 NOTE — PHYSICAL THERAPY INITIAL EVALUATION ADULT - GAIT PATTERN USED, PT EVAL
mild dizziness resolving c sitting rest breaks, decreased gait velocity and activity tolerance, contact A for safety due to fatigue and unsteadiness c turns
2-point gait

## 2018-07-12 NOTE — PHYSICAL THERAPY INITIAL EVALUATION ADULT - ADDITIONAL COMMENTS
owns no DME, 3+3 steps 1 rail to negotiate at home
Pt. living with her daughter in a private home with 3 steps to enter with handrail.

## 2018-07-12 NOTE — PROGRESS NOTE ADULT - SUBJECTIVE AND OBJECTIVE BOX
Overnight events:  Extubated with PaO2 low on ABG, SpO2 transiently low on monitor, responded to duonebs and steroids, SpO2 downtrending on monitor requiring HiFlow NC    Past Medical History:  Unstable angina pectoris  HTN (hypertension)  Unstable angina  Lethargic  Active Smoker  NSTEMI (non-ST elevated myocardial infarction)  Coronary artery disease involving native coronary artery of native heart with unstable angina pectoris    Medications:  ·	ALBUTerol/ipratropium for Nebulization 3 milliLiter(s) Nebulizer every 4 hours  ·	aspirin enteric coated 325 milliGRAM(s) Oral daily  ·	atorvastatin 40 milliGRAM(s) Oral at bedtime  ·	cefuroxime  IVPB 1500 milliGRAM(s) IV Intermittent every 8 hours  ·	clopidogrel Tablet 75 milliGRAM(s) Oral daily  ·	docusate sodium 100 milliGRAM(s) Oral three times a day  ·	hydrocortisone sodium succinate Injectable 100 milliGRAM(s) IV Push every 8 hours  ·	insulin Infusion 2 Unit(s)/Hr IV Continuous <Continuous>  ·	metoprolol tartrate 25 milliGRAM(s) Oral two times a day  ·	nitroglycerin  Infusion 10 MICROgram(s)/Min IV Continuous <Continuous>  ·	ondansetron Injectable 4 milliGRAM(s) IV Push every 6 hours PRN  ·	pantoprazole    Tablet 40 milliGRAM(s) Oral daily  ·	senna 2 Tablet(s) Oral at bedtime  ·	vancomycin  IVPB 1000 milliGRAM(s) IV Intermittent every 12 hours    MEDICATIONS  (PRN):  ondansetron Injectable 4 milliGRAM(s) IV Push every 6 hours PRN Nausea and/or Vomiting    Height (cm): 152 (07-11 @ 13:20)Mode: CPAP with PS, FiO2: 40, PEEP: 5, PS: 5, MAP: 7    ABG - ( 11 Jul 2018 22:05 )  pH, Arterial: 7.46  pH, Blood: x     /  pCO2: 42    /  pO2: 69    / HCO3: 29    / Base Excess: 5.1   /  SaO2: 95                            9.5    17.0  )-----------( 292      ( 11 Jul 2018 14:37 )             29.5   07-11    140  |  100  |  17.0  ----------------------------<  172<H>  4.0   |  24.0  |  0.59    Ca    9.5      11 Jul 2018 14:37  Mg     2.6     07-11    TPro  6.0<L>  /  Alb  3.8  /  TBili  0.8  /  DBili  x   /  AST  25  /  ALT  13  /  AlkPhos  62  07-11    CARDIAC MARKERS ( 11 Jul 2018 14:37 )  x     / 0.44 ng/mL / 207 U/L / x     / 20.3 ng/mL  CARDIAC MARKERS ( 10 Jul 2018 02:01 )  x     / 0.06 ng/mL / 60 U/L / x     / x        PT/INR - ( 11 Jul 2018 14:37 )   PT: 13.4 sec;   INR: 1.21 ratio    PTT - ( 11 Jul 2018 14:37 )  PTT:31.5 sec    Objective:  T(C): 36.5 (07-12-18 @ 00:00), Max: 36.9 (07-11-18 @ 18:00)  HR: 84 (07-12-18 @ 00:07) (73 - 99)  BP: 120/59 (07-11-18 @ 19:30) (120/59 - 131/67)  RR: 26 (07-12-18 @ 00:00) (10 - 29)  SpO2: 96% (07-12-18 @ 00:07) (90% - 100%)  Wt(kg): --CAPILLARY BLOOD GLUCOSE      POCT Blood Glucose.: 123 mg/dL (12 Jul 2018 00:05)  POCT Blood Glucose.: 125 mg/dL (11 Jul 2018 23:02)  POCT Blood Glucose.: 135 mg/dL (11 Jul 2018 21:00)  POCT Blood Glucose.: 134 mg/dL (11 Jul 2018 20:07)  POCT Blood Glucose.: 146 mg/dL (11 Jul 2018 18:00)  POCT Blood Glucose.: 169 mg/dL (11 Jul 2018 17:00)  POCT Blood Glucose.: 193 mg/dL (11 Jul 2018 15:55)  I&O's Summary    10 Jul 2018 07:01  -  11 Jul 2018 07:00  --------------------------------------------------------  IN: 120 mL / OUT: 0 mL / NET: 120 mL    11 Jul 2018 07:01  -  12 Jul 2018 00:51  --------------------------------------------------------  IN: 3828 mL / OUT: 4890 mL / NET: -1062 mL        Physical Exam  Neuro: A+O x 3, non-focal, speech clear and intact  Pulm: wheezing auscultated bilateral anterior and lateral chest, + secretions   CV: RRR, no murmurs, +S1S2, no EOM  Abd: soft, NT, ND, +BS  Ext: +DP Pulses b/l, +PT pulses, no edema  Inc: MSI C/D/I/stable w/ dsg, left C/D/I w/ dsg/Ace wrap  Drains: Left pleura, Mediastinal chest tubes

## 2018-07-12 NOTE — PHYSICAL THERAPY INITIAL EVALUATION ADULT - MANUAL MUSCLE TESTING RESULTS, REHAB EVAL
no strength deficits were identified/except elyse LE: grossly 3+/5 throughout
no strength deficits were identified

## 2018-07-12 NOTE — CDI QUERY NOTE - NSCDIOTHERTXTBX_GEN_ALL_CORE_HH
QUERY:   73F admitted with NSTEMI, heavy smoker., s/p  CABG x4  7/10/18 slight respiratory acidosis and hypoxia for which she was placed on NC by RT.  7/12/18 note: Extubated with PaO2 low on ABG, SpO2 transiently low on monitor, responded to duonebs and steroids, SpO2 downtrending on monitor requiring HiFlow NC  7/12/18 pulm: evidence of COPD with post op bronchospasm.  No CO2 retention  Review of repeat preop aiden>not optimal study as FVC at 50% but evidence of at worst mild-moderate obstruction  7/12/18 ABG: pH 7.52 pCO2-35  pO2-54  Sats 90-92-94% on 50% HFNC  RR 19-34  ***Please clarify a dx for the above findings and use of HFNC  e.g. Acute pulmonary insufficiency post surgery due to COPD/ tobacco dependence         Acute postoperative respiratory failure         COPD exacerbation/ chronic respiratory failure         Other                                            THANK YOU

## 2018-07-12 NOTE — PROGRESS NOTE ADULT - PROBLEM SELECTOR PLAN 4
D/C  IV narcotics d/t somulence Pulmonary following  HF NC for hypoxia (SpO2 89-90% sustained)  Re-started on Solucortef, will monitor mental status given prior history of anxiety  Continue nebs Q 4 hrs x 48 hrs while awake

## 2018-07-12 NOTE — PROGRESS NOTE ADULT - ASSESSMENT
Brief History:  Patient Tiny Couch is a 73 year old female with significant PMH of HTN, CAD, and active smoker (since age 17, ~ 1  per 3 days), initially presented to [facility] on 7/6/18 with unstable angina, ruled in NSTEMI, LHC showed TVD, at which time CT surgery was consulted. Preop, beta blocker was initially withheld due to bradycardia and patient had Brief History:  Patient Tiny Couch is a 73 year old female with significant PMH of HTN, CAD, and active smoker (since age 17, ~ 1  per 3 days), initially presented to [facility] on 7/6/18 with unstable angina, ruled in NSTEMI, LHC showed TVD, at which time CT surgery was consulted. Preop, beta blocker was initially withheld due to bradycardia and she had chest pain found to be anxiety ridden with negative work up. Ultimately, patient underwent four-vessel CABG (LIMA-LAD, SVG-OM, sequential SVG- RCA/PDA, with left saphenous vein EVH with Dr. Manuel on 7/11/18.     Inpatient History:  7/11- Extubated with PaO2 60s-70s, given history of active smoking hypoxic respiratory issues expected, treated with duonebs and solucortef given wheezing, SpO2 sustaining at 89-90% so HF NC was started, Cardene transitioned to NTG gtt given possibility of shunting contributing to hypoxia, autodiuresing     Plan:  Wean HF NC as tolerated  Transition insulin   OOB to chair

## 2018-07-12 NOTE — PHYSICAL THERAPY INITIAL EVALUATION ADULT - PLANNED THERAPY INTERVENTIONS, PT EVAL
bed mobility training/balance training/gait training/strengthening/transfer training
transfer training/stair training/gait training

## 2018-07-12 NOTE — PROGRESS NOTE ADULT - PROBLEM SELECTOR PLAN 2
Continue ASA,statin  Beta blocker initiall defferred due to bradycardia.> will re evaluate  DASH/TLC diet. now revascularized

## 2018-07-12 NOTE — CDI QUERY NOTE - NSCDICONTACTSINFO_GEN_ALL_CORE_HH
Shalini -  ADILENE Tavera [phone] [email or web address] [phone] [email or web address]

## 2018-07-12 NOTE — DIETITIAN INITIAL EVALUATION ADULT. - OTHER INFO
Spoke with daughters and granddaughters at bedside; report pt eats well at home, does not follow any specific diet, no recent wt changes, pt tolerated sips of water, has not had clear liquid diet yet. Pt currently receiving resp treatment

## 2018-07-12 NOTE — PROGRESS NOTE ADULT - PROBLEM SELECTOR PLAN 3
Current cigarette smoker.  Smoking cessation education.  Pulmonary following  Supplemental O2  Received short Course of steroids > d/cd with tachycardia, agitation  Continue nebs. Current cigarette smoker.  Smoking cessation education.

## 2018-07-12 NOTE — PHYSICAL THERAPY INITIAL EVALUATION ADULT - PERTINENT HX OF CURRENT PROBLEM, REHAB EVAL
pt presents to [facility] due to chest pain, NSTEMI, possible CABG planned for this week
CAD

## 2018-07-12 NOTE — DIETITIAN INITIAL EVALUATION ADULT. - PERTINENT LABORATORY DATA
07-12 Na139 mmol/L Glu 120 mg/dL<H> K+ 3.9 mmol/L Cr  0.40 mg/dL<L> BUN 12.0 mg/dL Phos n/a   Alb 4.2 g/dL PAB n/a

## 2018-07-12 NOTE — PHYSICAL THERAPY INITIAL EVALUATION ADULT - CRITERIA FOR SKILLED THERAPEUTIC INTERVENTIONS
functional limitations in following categories/impairments found/therapy frequency/anticipated discharge recommendation/rehab potential/predicted duration of therapy intervention
anticipated discharge recommendation/functional limitations in following categories/impairments found

## 2018-07-12 NOTE — PHYSICAL THERAPY INITIAL EVALUATION ADULT - GENERAL OBSERVATIONS, REHAB EVAL
pt received sitting in chair at bedside, cardiac monitor, family present to assist in communication
Pt. OOB; +monitor, O2 high flow, Right IJ central, radial A-line, chest tubes, wound vac., magallanes; alert and cooperative

## 2018-07-12 NOTE — PROGRESS NOTE ADULT - ASSESSMENT
Patient with evidence of COPD with post op bronchospasm.    No CO2 retention  Review of repeat preop aiden>not optimal study as FVC at 50% but evidence of at worst mild-moderate obstruction  Plan:  1.Discussed with ICU staff  2.Advise change of bronchodilator regiment to ipratroprium albuterol  3.Add budesonide q12 via nebulizer  4.Agree with steroids>decrease in next 24 hours

## 2018-07-12 NOTE — PROGRESS NOTE ADULT - SUBJECTIVE AND OBJECTIVE BOX
PULMONARY PROGRESS NOTE      [id number]    Patient is a 73y old  Female who presents with a chief complaint of chest pain (06 Jul 2018 12:16)      INTERVAL HPI/OVERNIGHT EVENTS:  S/P CABG yesterday  Extubated but with increased A-a gradient and wheezing  Given steroids, >xopenex due to anxious behavior    MEDICATIONS  (STANDING):  aspirin enteric coated 325 milliGRAM(s) Oral daily  atorvastatin 40 milliGRAM(s) Oral at bedtime  cefuroxime  IVPB 1500 milliGRAM(s) IV Intermittent every 8 hours  clopidogrel Tablet 75 milliGRAM(s) Oral daily  dextrose 50% Injectable 50 milliLiter(s) IV Push every 15 minutes  dextrose 50% Injectable 25 milliLiter(s) IV Push every 15 minutes  docusate sodium 100 milliGRAM(s) Oral three times a day  enoxaparin Injectable 40 milliGRAM(s) SubCutaneous daily  ferrous    sulfate 325 milliGRAM(s) Oral three times a day  folic acid 1 milliGRAM(s) Oral daily  hydrocortisone sodium succinate Injectable 100 milliGRAM(s) IV Push every 8 hours  insulin Infusion 2 Unit(s)/Hr (2 mL/Hr) IV Continuous <Continuous>  insulin lispro Injectable (HumaLOG) 2 Unit(s) SubCutaneous three times a day before meals  ipratropium    for Nebulization 500 MICROGram(s) Nebulizer every 6 hours  levalbuterol Inhalation 1.25 milliGRAM(s) Inhalation every 6 hours  metoprolol tartrate 25 milliGRAM(s) Oral two times a day  multivitamin 1 Tablet(s) Oral daily  nitroglycerin  Infusion 10 MICROgram(s)/Min (3 mL/Hr) IV Continuous <Continuous>  pantoprazole    Tablet 40 milliGRAM(s) Oral daily  potassium chloride  10 mEq/50 mL IVPB 10 milliEquivalent(s) IV Intermittent every 1 hour  potassium chloride  10 mEq/50 mL IVPB 10 milliEquivalent(s) IV Intermittent every 1 hour  potassium chloride  10 mEq/50 mL IVPB 10 milliEquivalent(s) IV Intermittent every 1 hour  senna 2 Tablet(s) Oral at bedtime  sodium chloride 0.9%. 1000 milliLiter(s) (5 mL/Hr) IV Continuous <Continuous>  sodium chloride 0.9%. 1000 milliLiter(s) (10 mL/Hr) IV Continuous <Continuous>  thiamine 100 milliGRAM(s) Oral daily  vancomycin  IVPB 1000 milliGRAM(s) IV Intermittent every 12 hours      MEDICATIONS  (PRN):  ondansetron Injectable 4 milliGRAM(s) IV Push every 6 hours PRN Nausea and/or Vomiting      Allergies    latex (Unknown)  No Known Drug Allergies    Intolerances    [facility] PT (Unknown)      PAST MEDICAL & SURGICAL HISTORY:  HTN (hypertension)  No significant past surgical history      SOCIAL HISTORY  Smoking History: Active until admission      REVIEW OF SYSTEMS:    CONSTITUTIONAL:  No distress    HEENT:  Eyes:  No diplopia or blurred vision. ENT:  No earache, sore throat or runny nose.    CARDIOVASCULAR:  Post CABG    RESPIRATORY:  + cough,     GASTROINTESTINAL:  No nausea, vomiting or diarrhea.    GENITOURINARY:  No dysuria, frequency or urgency.    MUSCULOSKELETAL:  No joint pain    SKIN:  No new lesions.    NEUROLOGIC:  No paresthesias, fasciculations, seizures or weakness.    PSYCHIATRIC:  No disorder of thought or mood.    ENDOCRINE:  No heat or cold intolerance, polyuria or polydipsia.    HEMATOLOGICAL:  No easy bruising or bleeding.     Vital Signs Last 24 Hrs  T(C): 36.9 (12 Jul 2018 08:00), Max: 37.2 (12 Jul 2018 07:00)  T(F): 98.4 (12 Jul 2018 08:00), Max: 99 (12 Jul 2018 07:00)  HR: 83 (12 Jul 2018 10:00) (74 - 106)  BP: 120/59 (11 Jul 2018 19:30) (120/59 - 120/59)  BP(mean): 83 (11 Jul 2018 19:30) (83 - 83)  RR: 35 (12 Jul 2018 10:00) (10 - 87)  SpO2: 94% (12 Jul 2018 10:00) (90% - 100%)    PHYSICAL EXAMINATION:    GENERAL: The patient is awake and alert in no apparent distress.     HEENT: Head is normocephalic and atraumatic. Extraocular muscles are intact. Mucous membranes are moist.    NECK: Supple.    LUNGS: Rhonchi predominantly right side, left relatively clear with fairly good air entry    HEART: Regular rate and rhythm without murmur.    ABDOMEN: Soft, nontender, and nondistended.      EXTREMITIES: Without any cyanosis, clubbing, rash, lesions or edema.    NEUROLOGIC: Grossly intact.    SKIN: No ulceration or induration present.      LABS:                        8.2    14.6  )-----------( 290      ( 12 Jul 2018 03:25 )             25.1     07-12    139  |  98  |  12.0  ----------------------------<  120<H>  3.9   |  26.0  |  0.40<L>    Ca    8.7      12 Jul 2018 03:25  Mg     2.1     07-12    TPro  6.3<L>  /  Alb  4.2  /  TBili  1.4  /  DBili  x   /  AST  26  /  ALT  12  /  AlkPhos  50  07-12    PT/INR - ( 12 Jul 2018 03:25 )   PT: 12.0 sec;   INR: 1.09 ratio         PTT - ( 12 Jul 2018 03:25 )  PTT:27.2 sec    ABG - ( 12 Jul 2018 04:20 )  pH, Arterial: 7.52  pH, Blood: x     /  pCO2: 35    /  pO2: 54    / HCO3: 29    / Base Excess: 5.6   /  SaO2: 92                CARDIAC MARKERS ( 12 Jul 2018 03:25 )  x     / 0.26 ng/mL / 296 U/L / x     / 12.1 ng/mL  CARDIAC MARKERS ( 11 Jul 2018 14:37 )  x     / 0.44 ng/mL / 207 U/L / x     / 20.3 ng/mL                MICROBIOLOGY:    RADIOLOGY & ADDITIONAL STUDIES:< from: Xray Chest 1 View AP/PA. (07.12.18 @ 06:20) >   EXAM:  XR CHEST AP OR PA 1V                          PROCEDURE DATE:  07/12/2018          INTERPRETATION:  TECHNIQUE: Single portable view of the chest.    COMPARISON: 7/11/2018    CLINICAL HISTORY: Status post open heart surgery.     FINDINGS:    Single frontal view of the chest demonstrates the lungs to be clear. The   cardiomediastinal silhouette is enlarged. No acute osseous abnormalities.   Overlying EKG leads and wires are noted. Status post post extubation and   NG tube removal. Right-sided central venous catheter, mediastinal chest   tube, and left lower lobe chest tube remain.    IMPRESSION: Status post extubation and NG tube removal.      < end of copied text >

## 2018-07-13 LAB
ANION GAP SERPL CALC-SCNC: 15 MMOL/L — SIGNIFICANT CHANGE UP (ref 5–17)
BUN SERPL-MCNC: 10 MG/DL — SIGNIFICANT CHANGE UP (ref 8–20)
CALCIUM SERPL-MCNC: 9 MG/DL — SIGNIFICANT CHANGE UP (ref 8.6–10.2)
CHLORIDE SERPL-SCNC: 97 MMOL/L — LOW (ref 98–107)
CO2 SERPL-SCNC: 23 MMOL/L — SIGNIFICANT CHANGE UP (ref 22–29)
CREAT SERPL-MCNC: 0.38 MG/DL — LOW (ref 0.5–1.3)
GLUCOSE BLDC GLUCOMTR-MCNC: 151 MG/DL — HIGH (ref 70–99)
GLUCOSE BLDC GLUCOMTR-MCNC: 152 MG/DL — HIGH (ref 70–99)
GLUCOSE BLDC GLUCOMTR-MCNC: 166 MG/DL — HIGH (ref 70–99)
GLUCOSE BLDC GLUCOMTR-MCNC: 239 MG/DL — HIGH (ref 70–99)
GLUCOSE SERPL-MCNC: 121 MG/DL — HIGH (ref 70–115)
HCT VFR BLD CALC: 22.4 % — LOW (ref 37–47)
HCT VFR BLD CALC: 28.2 % — LOW (ref 37–47)
HGB BLD-MCNC: 7.4 G/DL — LOW (ref 12–16)
HGB BLD-MCNC: 9.3 G/DL — LOW (ref 12–16)
MAGNESIUM SERPL-MCNC: 2.2 MG/DL — SIGNIFICANT CHANGE UP (ref 1.6–2.6)
MCHC RBC-ENTMCNC: 29.9 PG — SIGNIFICANT CHANGE UP (ref 27–31)
MCHC RBC-ENTMCNC: 30.2 PG — SIGNIFICANT CHANGE UP (ref 27–31)
MCHC RBC-ENTMCNC: 33 G/DL — SIGNIFICANT CHANGE UP (ref 32–36)
MCHC RBC-ENTMCNC: 33 G/DL — SIGNIFICANT CHANGE UP (ref 32–36)
MCV RBC AUTO: 90.7 FL — SIGNIFICANT CHANGE UP (ref 81–99)
MCV RBC AUTO: 91.4 FL — SIGNIFICANT CHANGE UP (ref 81–99)
PLATELET # BLD AUTO: 239 K/UL — SIGNIFICANT CHANGE UP (ref 150–400)
PLATELET # BLD AUTO: 264 K/UL — SIGNIFICANT CHANGE UP (ref 150–400)
POTASSIUM SERPL-MCNC: 3.4 MMOL/L — LOW (ref 3.5–5.3)
POTASSIUM SERPL-MCNC: 3.6 MMOL/L — SIGNIFICANT CHANGE UP (ref 3.5–5.3)
POTASSIUM SERPL-MCNC: 4.6 MMOL/L — SIGNIFICANT CHANGE UP (ref 3.5–5.3)
POTASSIUM SERPL-SCNC: 3.4 MMOL/L — LOW (ref 3.5–5.3)
POTASSIUM SERPL-SCNC: 3.6 MMOL/L — SIGNIFICANT CHANGE UP (ref 3.5–5.3)
POTASSIUM SERPL-SCNC: 4.6 MMOL/L — SIGNIFICANT CHANGE UP (ref 3.5–5.3)
RBC # BLD: 2.45 M/UL — LOW (ref 4.4–5.2)
RBC # BLD: 3.11 M/UL — LOW (ref 4.4–5.2)
RBC # FLD: 12.6 % — SIGNIFICANT CHANGE UP (ref 11–15.6)
RBC # FLD: 13.3 % — SIGNIFICANT CHANGE UP (ref 11–15.6)
SODIUM SERPL-SCNC: 135 MMOL/L — SIGNIFICANT CHANGE UP (ref 135–145)
WBC # BLD: 14.1 K/UL — HIGH (ref 4.8–10.8)
WBC # BLD: 15.8 K/UL — HIGH (ref 4.8–10.8)
WBC # FLD AUTO: 14.1 K/UL — HIGH (ref 4.8–10.8)
WBC # FLD AUTO: 15.8 K/UL — HIGH (ref 4.8–10.8)

## 2018-07-13 PROCEDURE — 71045 X-RAY EXAM CHEST 1 VIEW: CPT | Mod: 26

## 2018-07-13 PROCEDURE — 93010 ELECTROCARDIOGRAM REPORT: CPT

## 2018-07-13 RX ORDER — OXYCODONE HYDROCHLORIDE 5 MG/1
10 TABLET ORAL EVERY 4 HOURS
Qty: 0 | Refills: 0 | Status: DISCONTINUED | OUTPATIENT
Start: 2018-07-13 | End: 2018-07-16

## 2018-07-13 RX ORDER — AMLODIPINE BESYLATE 2.5 MG/1
10 TABLET ORAL DAILY
Qty: 0 | Refills: 0 | Status: DISCONTINUED | OUTPATIENT
Start: 2018-07-13 | End: 2018-07-16

## 2018-07-13 RX ORDER — POTASSIUM CHLORIDE 20 MEQ
20 PACKET (EA) ORAL
Qty: 0 | Refills: 0 | Status: COMPLETED | OUTPATIENT
Start: 2018-07-13 | End: 2018-07-13

## 2018-07-13 RX ORDER — ACETAMINOPHEN 500 MG
1000 TABLET ORAL ONCE
Qty: 0 | Refills: 0 | Status: COMPLETED | OUTPATIENT
Start: 2018-07-13 | End: 2018-07-13

## 2018-07-13 RX ORDER — FENTANYL CITRATE 50 UG/ML
12.5 INJECTION INTRAVENOUS ONCE
Qty: 0 | Refills: 0 | Status: DISCONTINUED | OUTPATIENT
Start: 2018-07-13 | End: 2018-07-13

## 2018-07-13 RX ORDER — AMLODIPINE BESYLATE 2.5 MG/1
10 TABLET ORAL ONCE
Qty: 0 | Refills: 0 | Status: COMPLETED | OUTPATIENT
Start: 2018-07-13 | End: 2018-07-13

## 2018-07-13 RX ORDER — POTASSIUM CHLORIDE 20 MEQ
40 PACKET (EA) ORAL ONCE
Qty: 0 | Refills: 0 | Status: COMPLETED | OUTPATIENT
Start: 2018-07-13 | End: 2018-07-13

## 2018-07-13 RX ORDER — OXYCODONE HYDROCHLORIDE 5 MG/1
5 TABLET ORAL EVERY 4 HOURS
Qty: 0 | Refills: 0 | Status: DISCONTINUED | OUTPATIENT
Start: 2018-07-13 | End: 2018-07-16

## 2018-07-13 RX ADMIN — Medication 100 MILLIGRAM(S): at 08:28

## 2018-07-13 RX ADMIN — Medication 100 MILLIGRAM(S): at 00:32

## 2018-07-13 RX ADMIN — OXYCODONE HYDROCHLORIDE 5 MILLIGRAM(S): 5 TABLET ORAL at 07:24

## 2018-07-13 RX ADMIN — Medication 100 MILLIGRAM(S): at 22:02

## 2018-07-13 RX ADMIN — Medication 50 MILLIGRAM(S): at 17:21

## 2018-07-13 RX ADMIN — Medication 100 MILLIEQUIVALENT(S): at 03:30

## 2018-07-13 RX ADMIN — OXYCODONE HYDROCHLORIDE 5 MILLIGRAM(S): 5 TABLET ORAL at 12:55

## 2018-07-13 RX ADMIN — Medication 1000 MILLIGRAM(S): at 05:40

## 2018-07-13 RX ADMIN — Medication 3 MILLILITER(S): at 14:55

## 2018-07-13 RX ADMIN — CLOPIDOGREL BISULFATE 75 MILLIGRAM(S): 75 TABLET, FILM COATED ORAL at 11:59

## 2018-07-13 RX ADMIN — AMLODIPINE BESYLATE 10 MILLIGRAM(S): 2.5 TABLET ORAL at 08:23

## 2018-07-13 RX ADMIN — Medication 100 MILLIEQUIVALENT(S): at 02:29

## 2018-07-13 RX ADMIN — FENTANYL CITRATE 12.5 MICROGRAM(S): 50 INJECTION INTRAVENOUS at 02:29

## 2018-07-13 RX ADMIN — Medication 100 MILLIGRAM(S): at 06:24

## 2018-07-13 RX ADMIN — Medication 325 MILLIGRAM(S): at 11:59

## 2018-07-13 RX ADMIN — Medication 1000 MILLIGRAM(S): at 17:40

## 2018-07-13 RX ADMIN — Medication 0.5 MILLIGRAM(S): at 21:16

## 2018-07-13 RX ADMIN — PANTOPRAZOLE SODIUM 40 MILLIGRAM(S): 20 TABLET, DELAYED RELEASE ORAL at 11:58

## 2018-07-13 RX ADMIN — Medication 250 MILLIGRAM(S): at 10:19

## 2018-07-13 RX ADMIN — Medication 325 MILLIGRAM(S): at 06:24

## 2018-07-13 RX ADMIN — Medication 50 MILLIGRAM(S): at 04:06

## 2018-07-13 RX ADMIN — OXYCODONE HYDROCHLORIDE 5 MILLIGRAM(S): 5 TABLET ORAL at 20:37

## 2018-07-13 RX ADMIN — Medication 325 MILLIGRAM(S): at 12:00

## 2018-07-13 RX ADMIN — Medication 1 TABLET(S): at 11:59

## 2018-07-13 RX ADMIN — Medication 1 MILLIGRAM(S): at 11:59

## 2018-07-13 RX ADMIN — Medication 100 MILLIGRAM(S): at 11:59

## 2018-07-13 RX ADMIN — Medication 1: at 16:35

## 2018-07-13 RX ADMIN — Medication 3 MILLILITER(S): at 03:51

## 2018-07-13 RX ADMIN — Medication 0.5 MILLIGRAM(S): at 08:34

## 2018-07-13 RX ADMIN — Medication 1: at 08:23

## 2018-07-13 RX ADMIN — Medication 400 MILLIGRAM(S): at 17:25

## 2018-07-13 RX ADMIN — OXYCODONE HYDROCHLORIDE 5 MILLIGRAM(S): 5 TABLET ORAL at 11:55

## 2018-07-13 RX ADMIN — Medication 100 MILLIGRAM(S): at 12:00

## 2018-07-13 RX ADMIN — Medication 40 MILLIEQUIVALENT(S): at 17:21

## 2018-07-13 RX ADMIN — Medication 2: at 11:58

## 2018-07-13 RX ADMIN — Medication 3 MILLILITER(S): at 08:34

## 2018-07-13 RX ADMIN — ENOXAPARIN SODIUM 40 MILLIGRAM(S): 100 INJECTION SUBCUTANEOUS at 11:59

## 2018-07-13 RX ADMIN — Medication 3 MILLILITER(S): at 21:16

## 2018-07-13 RX ADMIN — FENTANYL CITRATE 12.5 MICROGRAM(S): 50 INJECTION INTRAVENOUS at 01:11

## 2018-07-13 RX ADMIN — OXYCODONE HYDROCHLORIDE 5 MILLIGRAM(S): 5 TABLET ORAL at 22:03

## 2018-07-13 RX ADMIN — OXYCODONE HYDROCHLORIDE 5 MILLIGRAM(S): 5 TABLET ORAL at 06:24

## 2018-07-13 RX ADMIN — Medication 400 MILLIGRAM(S): at 05:25

## 2018-07-13 RX ADMIN — Medication 100 MILLIEQUIVALENT(S): at 01:10

## 2018-07-13 RX ADMIN — AMLODIPINE BESYLATE 10 MILLIGRAM(S): 2.5 TABLET ORAL at 00:32

## 2018-07-13 NOTE — PROGRESS NOTE ADULT - ASSESSMENT
Brief History:  Patient Tiny Couch is a 73 year old female with significant PMH of HTN, CAD, and active smoker (since age 17, ~ 1  per 3 days), initially presented to [facility] on 7/6/18 with unstable angina, ruled in NSTEMI, LHC showed TVD, at which time CT surgery was consulted. Preop, beta blocker was initially withheld due to bradycardia and she had chest pain found to be anxiety ridden with negative work up. Ultimately, patient underwent four-vessel CABG (LIMA-LAD, SVG-OM, sequential SVG- RCA/PDA, with left saphenous vein EVH with Dr. Manuel on 7/11/18.     Inpatient History:  7/11- Extubated with PaO2 60s-70s, given history of active smoking hypoxic respiratory issues expected, treated with duonebs and solucortef given wheezing, SpO2 sustaining at 89-90% so HF NC was started, Cardene transitioned to NTG gtt given possibility of shunting contributing to hypoxia, autodiuresing  7/12- Solucortef completed, seen by Pulmonary Symbicort added       Plan:  Wean HF NC as tolerated  Wean NTG gtt, transition to oral medications  Remove mediastinal chest tube drains if output decreases

## 2018-07-13 NOTE — PROGRESS NOTE ADULT - PROBLEM SELECTOR PLAN 7
Continue Lopressor, titrate for goal BP and HR   Norvasc added   Titrate NTG gtt off
Continue Lopressor, titrate for goal BP and HR   Titrate NTG gtt off

## 2018-07-13 NOTE — PROGRESS NOTE ADULT - SUBJECTIVE AND OBJECTIVE BOX
Overnight events:  Hypoxia improved, wheezing improved on physical exam, remains with pain issues that are better controlled today.    Past Medical History:  Unstable angina pectoris  HTN (hypertension)  Unstable angina  Lethargic  Active Smoker  NSTEMI (non-ST elevated myocardial infarction)  Coronary artery disease involving native coronary artery of native heart with unstable angina pectoris    Medications:  ALBUTerol/ipratropium for Nebulization 3 milliLiter(s) Nebulizer every 6 hours  amLODIPine   Tablet 10 milliGRAM(s) Oral daily  aspirin enteric coated 325 milliGRAM(s) Oral daily  atorvastatin 40 milliGRAM(s) Oral at bedtime  buDESOnide   0.5 milliGRAM(s) Respule 0.5 milliGRAM(s) Inhalation every 12 hours  cefuroxime  IVPB 1500 milliGRAM(s) IV Intermittent every 8 hours  clopidogrel Tablet 75 milliGRAM(s) Oral daily  docusate sodium 100 milliGRAM(s) Oral three times a day  enoxaparin Injectable 40 milliGRAM(s) SubCutaneous daily  ferrous    sulfate 325 milliGRAM(s) Oral three times a day  folic acid 1 milliGRAM(s) Oral daily  glucagon  Injectable 1 milliGRAM(s) IntraMuscular once PRN  insulin lispro (HumaLOG) corrective regimen sliding scale   SubCutaneous Before meals and at bedtime  metoprolol tartrate 50 milliGRAM(s) Oral every 12 hours  multivitamin 1 Tablet(s) Oral daily  nitroglycerin  Infusion 10 MICROgram(s)/Min IV Continuous <Continuous>  ondansetron Injectable 4 milliGRAM(s) IV Push every 6 hours PRN  pantoprazole    Tablet 40 milliGRAM(s) Oral daily  polyethylene glycol 3350 17 Gram(s) Oral daily PRN  senna 2 Tablet(s) Oral at bedtime  thiamine 100 milliGRAM(s) Oral daily  vancomycin  IVPB 1000 milliGRAM(s) IV Intermittent every 12 hours    MEDICATIONS  (PRN):  dextrose 40% Gel 15 Gram(s) Oral once PRN Blood Glucose LESS THAN 70 milliGRAM(s)/deciliter  glucagon  Injectable 1 milliGRAM(s) IntraMuscular once PRN Glucose LESS THAN 70 milligrams/deciliter  ondansetron Injectable 4 milliGRAM(s) IV Push every 6 hours PRN Nausea and/or Vomiting  polyethylene glycol 3350 17 Gram(s) Oral daily PRN Constipation      Daily     Daily Weight in k (2018 09:08)      ABG - ( 2018 04:20 )  pH, Arterial: 7.52  pH, Blood: x     /  pCO2: 35    /  pO2: 54    / HCO3: 29    / Base Excess: 5.6   /  SaO2: 92                                8.2    14.6  )-----------( 290      ( 2018 03:25 )             25.1   0713    x   |  x   |  x   ----------------------------<  x   3.4<L>   |  x   |  x     Ca    8.7      2018 03:25  Mg     2.1     12    TPro  6.3<L>  /  Alb  4.2  /  TBili  1.4  /  DBili  x   /  AST  26  /  ALT  12  /  AlkPhos  50  07-12    CARDIAC MARKERS ( 2018 03:25 )  x     / 0.26 ng/mL / 296 U/L / x     / 12.1 ng/mL  CARDIAC MARKERS ( 2018 14:37 )  x     / 0.44 ng/mL / 207 U/L / x     / 20.3 ng/mL    PT/INR - ( 2018 03:25 )   PT: 12.0 sec;   INR: 1.09 ratio         PTT - ( 2018 03:25 )  PTT:27.2 sec      Objective:  T(C): 37 (18 @ 01:00), Max: 37.2 (18 @ 07:00)  HR: 78 (18 @ 01:30) (72 - 106)  BP: 99/54 (18 @ 01:30) (99/54 - 134/63)  RR: 20 (18 @ 01:30) (16 - 87)  SpO2: 96% (18 @ 01:30) (89% - 96%)  Wt(kg): --CAPILLARY BLOOD GLUCOSE      POCT Blood Glucose.: 152 mg/dL (2018 00:03)  POCT Blood Glucose.: 121 mg/dL (2018 21:38)  POCT Blood Glucose.: 92 mg/dL (2018 20:39)  POCT Blood Glucose.: 85 mg/dL (2018 20:19)  POCT Blood Glucose.: 121 mg/dL (2018 18:33)  POCT Blood Glucose.: 154 mg/dL (2018 16:05)  POCT Blood Glucose.: 135 mg/dL (2018 13:19)  POCT Blood Glucose.: 98 mg/dL (2018 11:50)  POCT Blood Glucose.: 186 mg/dL (2018 10:05)  POCT Blood Glucose.: 136 mg/dL (2018 06:55)  POCT Blood Glucose.: 143 mg/dL (2018 06:23)  POCT Blood Glucose.: 125 mg/dL (2018 03:09)  POCT Blood Glucose.: 127 mg/dL (2018 02:17)  I&O's Summary    2018 07:  -  2018 07:00  --------------------------------------------------------  IN: 4567.5 mL / OUT: 6630 mL / NET: -2062.5 mL    2018 07:01  -  2018 01:42  --------------------------------------------------------  IN: 1710 mL / OUT: 1750 mL / NET: -40 mL        Physical Exam  Neuro: A+O x 3, non-focal, speech clear and intact  Pulm: CTA, equal bilaterally, wheezing improved  CV: RRR, no murmurs, +S1S2  Abd: soft, NT, ND, +BS  Ext: +DP Pulses b/l, +PT pulses, no edema  Inc: MSI C/D/I/stable w/ dsg, LEFT EVH C/D/I w/ dsg/Ace wrap  Drains: Left pleural, Mediastinal chest tubes

## 2018-07-13 NOTE — PROGRESS NOTE ADULT - PROBLEM SELECTOR PLAN 4
Pulmonary following  HF NC for hypoxia, now improving  Completed postop course of Solucortef   Complete duonebs Q 4 hrs x 48 hrs while awake

## 2018-07-13 NOTE — PROGRESS NOTE ADULT - PROBLEM SELECTOR PLAN 1
Wean IV anti-hypertensive agents as tolerated  Beta blocker as tolerated by HR and SBP  Lipitor for chronic graft patency prophylaxis  Plavix for acute graft closure prophylaxis  Aspirin for acute graft closure prophylaxis  Encourage PO intake  Encourage OOB to chair and ambulation with PT  Encourage deep breathing exercised and coughing  Chest PT and IS use with bedside nurse

## 2018-07-14 DIAGNOSIS — Z29.9 ENCOUNTER FOR PROPHYLACTIC MEASURES, UNSPECIFIED: ICD-10-CM

## 2018-07-14 LAB
ANION GAP SERPL CALC-SCNC: 11 MMOL/L — SIGNIFICANT CHANGE UP (ref 5–17)
BUN SERPL-MCNC: 13 MG/DL — SIGNIFICANT CHANGE UP (ref 8–20)
CALCIUM SERPL-MCNC: 8.8 MG/DL — SIGNIFICANT CHANGE UP (ref 8.6–10.2)
CHLORIDE SERPL-SCNC: 99 MMOL/L — SIGNIFICANT CHANGE UP (ref 98–107)
CO2 SERPL-SCNC: 27 MMOL/L — SIGNIFICANT CHANGE UP (ref 22–29)
CREAT SERPL-MCNC: 0.41 MG/DL — LOW (ref 0.5–1.3)
GLUCOSE SERPL-MCNC: 135 MG/DL — HIGH (ref 70–115)
HCT VFR BLD CALC: 28.5 % — LOW (ref 37–47)
HGB BLD-MCNC: 9.3 G/DL — LOW (ref 12–16)
MAGNESIUM SERPL-MCNC: 2.2 MG/DL — SIGNIFICANT CHANGE UP (ref 1.6–2.6)
MCHC RBC-ENTMCNC: 30.1 PG — SIGNIFICANT CHANGE UP (ref 27–31)
MCHC RBC-ENTMCNC: 32.6 G/DL — SIGNIFICANT CHANGE UP (ref 32–36)
MCV RBC AUTO: 92.2 FL — SIGNIFICANT CHANGE UP (ref 81–99)
PLATELET # BLD AUTO: 250 K/UL — SIGNIFICANT CHANGE UP (ref 150–400)
POTASSIUM SERPL-MCNC: 3.9 MMOL/L — SIGNIFICANT CHANGE UP (ref 3.5–5.3)
POTASSIUM SERPL-SCNC: 3.9 MMOL/L — SIGNIFICANT CHANGE UP (ref 3.5–5.3)
RBC # BLD: 3.09 M/UL — LOW (ref 4.4–5.2)
RBC # FLD: 13.3 % — SIGNIFICANT CHANGE UP (ref 11–15.6)
SODIUM SERPL-SCNC: 137 MMOL/L — SIGNIFICANT CHANGE UP (ref 135–145)
WBC # BLD: 13.9 K/UL — HIGH (ref 4.8–10.8)
WBC # FLD AUTO: 13.9 K/UL — HIGH (ref 4.8–10.8)

## 2018-07-14 PROCEDURE — 71045 X-RAY EXAM CHEST 1 VIEW: CPT | Mod: 26

## 2018-07-14 RX ORDER — POTASSIUM CHLORIDE 20 MEQ
40 PACKET (EA) ORAL ONCE
Qty: 0 | Refills: 0 | Status: COMPLETED | OUTPATIENT
Start: 2018-07-14 | End: 2018-07-14

## 2018-07-14 RX ORDER — SODIUM CHLORIDE 9 MG/ML
3 INJECTION INTRAMUSCULAR; INTRAVENOUS; SUBCUTANEOUS EVERY 8 HOURS
Qty: 0 | Refills: 0 | Status: DISCONTINUED | OUTPATIENT
Start: 2018-07-14 | End: 2018-07-16

## 2018-07-14 RX ADMIN — OXYCODONE HYDROCHLORIDE 5 MILLIGRAM(S): 5 TABLET ORAL at 19:20

## 2018-07-14 RX ADMIN — Medication 1 MILLIGRAM(S): at 11:38

## 2018-07-14 RX ADMIN — Medication 50 MILLIGRAM(S): at 16:23

## 2018-07-14 RX ADMIN — OXYCODONE HYDROCHLORIDE 10 MILLIGRAM(S): 5 TABLET ORAL at 22:06

## 2018-07-14 RX ADMIN — PANTOPRAZOLE SODIUM 40 MILLIGRAM(S): 20 TABLET, DELAYED RELEASE ORAL at 11:35

## 2018-07-14 RX ADMIN — Medication 40 MILLIEQUIVALENT(S): at 06:15

## 2018-07-14 RX ADMIN — ATORVASTATIN CALCIUM 40 MILLIGRAM(S): 80 TABLET, FILM COATED ORAL at 01:01

## 2018-07-14 RX ADMIN — OXYCODONE HYDROCHLORIDE 10 MILLIGRAM(S): 5 TABLET ORAL at 23:07

## 2018-07-14 RX ADMIN — Medication 50 MILLIGRAM(S): at 06:16

## 2018-07-14 RX ADMIN — CLOPIDOGREL BISULFATE 75 MILLIGRAM(S): 75 TABLET, FILM COATED ORAL at 11:35

## 2018-07-14 RX ADMIN — Medication 3 MILLILITER(S): at 15:41

## 2018-07-14 RX ADMIN — OXYCODONE HYDROCHLORIDE 5 MILLIGRAM(S): 5 TABLET ORAL at 06:16

## 2018-07-14 RX ADMIN — Medication 325 MILLIGRAM(S): at 13:39

## 2018-07-14 RX ADMIN — ENOXAPARIN SODIUM 40 MILLIGRAM(S): 100 INJECTION SUBCUTANEOUS at 11:38

## 2018-07-14 RX ADMIN — OXYCODONE HYDROCHLORIDE 5 MILLIGRAM(S): 5 TABLET ORAL at 06:52

## 2018-07-14 RX ADMIN — Medication 325 MILLIGRAM(S): at 22:09

## 2018-07-14 RX ADMIN — SODIUM CHLORIDE 3 MILLILITER(S): 9 INJECTION INTRAMUSCULAR; INTRAVENOUS; SUBCUTANEOUS at 06:16

## 2018-07-14 RX ADMIN — OXYCODONE HYDROCHLORIDE 5 MILLIGRAM(S): 5 TABLET ORAL at 13:39

## 2018-07-14 RX ADMIN — OXYCODONE HYDROCHLORIDE 5 MILLIGRAM(S): 5 TABLET ORAL at 11:38

## 2018-07-14 RX ADMIN — OXYCODONE HYDROCHLORIDE 5 MILLIGRAM(S): 5 TABLET ORAL at 18:42

## 2018-07-14 RX ADMIN — Medication 3 MILLILITER(S): at 03:28

## 2018-07-14 RX ADMIN — SODIUM CHLORIDE 3 MILLILITER(S): 9 INJECTION INTRAMUSCULAR; INTRAVENOUS; SUBCUTANEOUS at 12:34

## 2018-07-14 RX ADMIN — Medication 1 TABLET(S): at 11:35

## 2018-07-14 RX ADMIN — Medication 325 MILLIGRAM(S): at 06:15

## 2018-07-14 RX ADMIN — Medication 100 MILLIGRAM(S): at 11:35

## 2018-07-14 RX ADMIN — Medication 325 MILLIGRAM(S): at 11:35

## 2018-07-14 RX ADMIN — Medication 3 MILLILITER(S): at 08:00

## 2018-07-14 RX ADMIN — Medication 0.5 MILLIGRAM(S): at 08:00

## 2018-07-14 RX ADMIN — Medication 0.5 MILLIGRAM(S): at 21:48

## 2018-07-14 RX ADMIN — AMLODIPINE BESYLATE 10 MILLIGRAM(S): 2.5 TABLET ORAL at 06:15

## 2018-07-14 RX ADMIN — SODIUM CHLORIDE 3 MILLILITER(S): 9 INJECTION INTRAMUSCULAR; INTRAVENOUS; SUBCUTANEOUS at 21:52

## 2018-07-14 RX ADMIN — Medication 325 MILLIGRAM(S): at 01:03

## 2018-07-14 RX ADMIN — Medication 3 MILLILITER(S): at 21:48

## 2018-07-14 RX ADMIN — ATORVASTATIN CALCIUM 40 MILLIGRAM(S): 80 TABLET, FILM COATED ORAL at 22:09

## 2018-07-14 RX ADMIN — SENNA PLUS 2 TABLET(S): 8.6 TABLET ORAL at 01:03

## 2018-07-14 NOTE — PROGRESS NOTE ADULT - SUBJECTIVE AND OBJECTIVE BOX
Subjective: c/o pain at CT site, improved after analgesia. Denies CP, SOB, palpitations, N/V, other c/o.    T(C): 37.3 (07-13-18 @ 18:22), Max: 37.3 (07-13-18 @ 18:22)  HR: 68 (07-13-18 @ 23:06) (62 - 90), NSR  BP: 151/67 (07-13-18 @ 23:06) (99/54 - 151/67)  ABP: 144/58 (07-13-18 @ 05:30) (136/56 - 179/69)  ABP(mean): 83 (07-13-18 @ 05:30) (77 - 101)  RR: 22 (07-13-18 @ 23:06) (18 - 41)  SpO2: 96% (07-13-18 @ 23:06) (90% - 99%) on 4L NC  CVP(mm Hg): 2 (07-13-18 @ 23:06) (0 - 16)      I&O's Detail    12 Jul 2018 07:01  -  13 Jul 2018 07:00  --------------------------------------------------------  Total IN: 2184 mL  Total OUT: 2950 mL  Total NET: -766 mL    13 Jul 2018 07:01  -  14 Jul 2018 00:20  --------------------------------------------------------  IN:    Oral Fluid: 360 mL    Packed Red Blood Cells: 322 mL    sodium chloride 0.9%.: 80 mL    sodium chloride 0.9%.: 80 mL    Solution: 50 mL    Solution: 250 mL    Solution: 100 mL  Total IN: 1242 mL    OUT:    left pleural Chest Tube: 60 mL    Indwelling Catheter - Urethral: 3550 mL  Total OUT: 3610 mL    Total NET: -2368 mL      LABS: All Lab data reviewed and analyzed                        9.3    15.8  )-----------( 239      ( 13 Jul 2018 15:33 )             28.2     07-13    x   |  x   |  x   ----------------------------<  x   3.6   |  x   |  x     Ca    9.0      13 Jul 2018 05:07  Mg     2.2     07-13    POCT Blood Glucose.: 166 mg/dL (13 Jul 2018 16:32)  POCT Blood Glucose.: 239 mg/dL (13 Jul 2018 11:57)  POCT Blood Glucose.: 151 mg/dL (13 Jul 2018 08:07)           RADIOLOGY: - Reviewed and analyzed   < from: Xray Chest 1 View AP/PA. (07.13.18 @ 09:52) >  Findings:     CVP line unchanged in the SVC. Mediastinal drain removed. No change in left chest tube position.  .  Lungs are relatively clear with minimal discoid atelectasis at the lung bases. No significant vascular congestion or pleural fluid.  The pulmonary vasculature and aorta are normal for age. Heart size is unremarkable.   Median sternotomy.  Impression: Minimal discoid atelectasis at left and right lung bases. Status post CABG  < end of copied text >    7/14/18 CXR: pending      HOSPITAL MEDICATIONS: All medications reviewed and analyzed  MEDICATIONS  (STANDING):  ALBUTerol/ipratropium for Nebulization 3 milliLiter(s) Nebulizer every 6 hours  amLODIPine   Tablet 10 milliGRAM(s) Oral daily  aspirin enteric coated 325 milliGRAM(s) Oral daily  atorvastatin 40 milliGRAM(s) Oral at bedtime  buDESOnide   0.5 milliGRAM(s) Respule 0.5 milliGRAM(s) Inhalation every 12 hours  clopidogrel Tablet 75 milliGRAM(s) Oral daily  docusate sodium 100 milliGRAM(s) Oral three times a day  enoxaparin Injectable 40 milliGRAM(s) SubCutaneous daily  ferrous    sulfate 325 milliGRAM(s) Oral three times a day  folic acid 1 milliGRAM(s) Oral daily  metoprolol tartrate 50 milliGRAM(s) Oral every 12 hours  multivitamin 1 Tablet(s) Oral daily  pantoprazole    Tablet 40 milliGRAM(s) Oral daily  senna 2 Tablet(s) Oral at bedtime  sodium chloride 0.9%. 1000 milliLiter(s) (@ 5 mL/Hr) IV Continuous   sodium chloride 0.9%. 1000 milliLiter(s) (@ 10 mL/Hr) IV Continuous   thiamine 100 milliGRAM(s) Oral daily    MEDICATIONS  (PRN):  dextrose 40% Gel 15 Gram(s) Oral once PRN Blood Glucose LESS THAN 70 milliGRAM(s)/deciliter  glucagon  Injectable 1 milliGRAM(s) IntraMuscular once PRN Glucose LESS THAN 70 milligrams/deciliter  oxyCODONE    IR 5 milliGRAM(s) Oral every 4 hours PRN Moderate Pain (4 - 6)  oxyCODONE    IR 10 milliGRAM(s) Oral every 4 hours PRN Severe Pain (7 - 10)  polyethylene glycol 3350 17 Gram(s) Oral daily PRN Constipation      Physical Exam  Neuro: A+O x 3, non-focal, speech clear and intact  HEENT:  NCAT, PERRL, EOMI. No conjuctival edema or iIcterus, no thrush.  No ETT or NGT/OGT  Neck:  ROM intact, no JVD, supple, trachea midline, no tracheostomy  Pulm: CTA, good air entry, equal bilaterally, no rales/rhonchi/wheezing, no accessory muscle use noted  Chest: left pleural CT with dressing intact and no air leak, no subQ emphysema  CV: regular rate, regular rhythm, +S1S2, no murmur or rub noted  Abd: soft, NT, ND, + BS  : magallanes in situ to bedside drainage  Ext: EARLY x 4, trace edema, no cyanosis or clubbing, distal motor/neuro/circ intact  Skin: warm, dry, no rashes  Incisions: midsternal C/D/I/stable w/ provena dressing, LLE C/D/I w/ dressing and Ace wrap

## 2018-07-14 NOTE — PROGRESS NOTE ADULT - PROBLEM SELECTOR PLAN 1
neurologically intact  hemodynamically stable  continue lopressor, aspirin, plavix, and statin  d/c introducer this morning  will d/c left pleural CT this morning  no diuresis at this time d/t auto diuresising, d/c magallanes, replete electrolytes PRN  continue with PT, increase activity as tolerated  BS WNL, FS d/c'd  tylenol and/or oxycodone PRN for analgesia  stable for transfer to floor  above plan of care to be discussed with CT surgical team on am rounds

## 2018-07-14 NOTE — PROGRESS NOTE ADULT - PROBLEM SELECTOR PLAN 6
Auto-diuresing  Supplement K PRN
Lovenox and SCDs for DVT prophylaxis  Protonix for GI prophylaxis
Autodiuresing  Supplement K PRN  May need diamox, re-evaluate in AM

## 2018-07-14 NOTE — PROGRESS NOTE ADULT - ASSESSMENT
Tiny Couch is a 73 year old female with significant PMH of HTN, CAD, and active smoker (since age 17, ~ 1  per 3 days), initially presented to [facility] on 7/6/18 with unstable angina, ruled in NSTEMI, LHC showed TVD, at which time CT surgery was consulted. Preop, beta blocker was initially withheld due to bradycardia and she had chest pain found to be anxiety ridden with negative work up. 7/11 s/p CABG x 4 (LIMA-LAD, SVG-OM, sequential SVG- RCA/PDA, with left saphenous EVH.  7/11- Extubated with PaO2 60s-70s, given history of active smoking hypoxic respiratory issues expected, treated with duonebs and solucortef given wheezing, hypoxemic (SpO2 sustaining at 89-90%) so HF NC was started, Cardene transitioned to NTG gtt given possibility of shunting contributing to hypoxemia, autodiuresing  7/12- Solucortef completed, seen by Pulmonary Symbicort added

## 2018-07-14 NOTE — PROGRESS NOTE ADULT - PROBLEM SELECTOR PLAN 4
hypoxemia improved  Pulmonary following  off HFNC  further FiO2 weaning as tolerated  C+DBE/IS encouraged  Completed postop course of Solucortef   Complete duoneb Q 4 hrs x 48 hrs while awake  continue pulmicort

## 2018-07-15 DIAGNOSIS — D50.0 IRON DEFICIENCY ANEMIA SECONDARY TO BLOOD LOSS (CHRONIC): ICD-10-CM

## 2018-07-15 LAB
ANION GAP SERPL CALC-SCNC: 16 MMOL/L — SIGNIFICANT CHANGE UP (ref 5–17)
BUN SERPL-MCNC: 18 MG/DL — SIGNIFICANT CHANGE UP (ref 8–20)
CALCIUM SERPL-MCNC: 8.6 MG/DL — SIGNIFICANT CHANGE UP (ref 8.6–10.2)
CHLORIDE SERPL-SCNC: 96 MMOL/L — LOW (ref 98–107)
CO2 SERPL-SCNC: 24 MMOL/L — SIGNIFICANT CHANGE UP (ref 22–29)
CREAT SERPL-MCNC: 0.58 MG/DL — SIGNIFICANT CHANGE UP (ref 0.5–1.3)
GLUCOSE SERPL-MCNC: 114 MG/DL — SIGNIFICANT CHANGE UP (ref 70–115)
HCT VFR BLD CALC: 28.5 % — LOW (ref 37–47)
HGB BLD-MCNC: 9.2 G/DL — LOW (ref 12–16)
MAGNESIUM SERPL-MCNC: 2 MG/DL — SIGNIFICANT CHANGE UP (ref 1.6–2.6)
MCHC RBC-ENTMCNC: 30.7 PG — SIGNIFICANT CHANGE UP (ref 27–31)
MCHC RBC-ENTMCNC: 32.3 G/DL — SIGNIFICANT CHANGE UP (ref 32–36)
MCV RBC AUTO: 95 FL — SIGNIFICANT CHANGE UP (ref 81–99)
PLATELET # BLD AUTO: 264 K/UL — SIGNIFICANT CHANGE UP (ref 150–400)
POTASSIUM SERPL-MCNC: 4.5 MMOL/L — SIGNIFICANT CHANGE UP (ref 3.5–5.3)
POTASSIUM SERPL-SCNC: 4.5 MMOL/L — SIGNIFICANT CHANGE UP (ref 3.5–5.3)
RBC # BLD: 3 M/UL — LOW (ref 4.4–5.2)
RBC # FLD: 13.1 % — SIGNIFICANT CHANGE UP (ref 11–15.6)
SODIUM SERPL-SCNC: 136 MMOL/L — SIGNIFICANT CHANGE UP (ref 135–145)
WBC # BLD: 15.2 K/UL — HIGH (ref 4.8–10.8)
WBC # FLD AUTO: 15.2 K/UL — HIGH (ref 4.8–10.8)

## 2018-07-15 PROCEDURE — 71045 X-RAY EXAM CHEST 1 VIEW: CPT | Mod: 26

## 2018-07-15 RX ADMIN — OXYCODONE HYDROCHLORIDE 5 MILLIGRAM(S): 5 TABLET ORAL at 10:07

## 2018-07-15 RX ADMIN — Medication 325 MILLIGRAM(S): at 05:04

## 2018-07-15 RX ADMIN — ATORVASTATIN CALCIUM 40 MILLIGRAM(S): 80 TABLET, FILM COATED ORAL at 21:04

## 2018-07-15 RX ADMIN — ENOXAPARIN SODIUM 40 MILLIGRAM(S): 100 INJECTION SUBCUTANEOUS at 21:19

## 2018-07-15 RX ADMIN — Medication 1 TABLET(S): at 07:54

## 2018-07-15 RX ADMIN — Medication 3 MILLILITER(S): at 21:32

## 2018-07-15 RX ADMIN — OXYCODONE HYDROCHLORIDE 5 MILLIGRAM(S): 5 TABLET ORAL at 18:12

## 2018-07-15 RX ADMIN — Medication 3 MILLILITER(S): at 14:57

## 2018-07-15 RX ADMIN — Medication 0.5 MILLIGRAM(S): at 21:32

## 2018-07-15 RX ADMIN — Medication 325 MILLIGRAM(S): at 10:07

## 2018-07-15 RX ADMIN — SODIUM CHLORIDE 3 MILLILITER(S): 9 INJECTION INTRAMUSCULAR; INTRAVENOUS; SUBCUTANEOUS at 09:57

## 2018-07-15 RX ADMIN — Medication 3 MILLILITER(S): at 09:21

## 2018-07-15 RX ADMIN — OXYCODONE HYDROCHLORIDE 5 MILLIGRAM(S): 5 TABLET ORAL at 16:57

## 2018-07-15 RX ADMIN — Medication 325 MILLIGRAM(S): at 21:04

## 2018-07-15 RX ADMIN — Medication 3 MILLILITER(S): at 04:07

## 2018-07-15 RX ADMIN — OXYCODONE HYDROCHLORIDE 5 MILLIGRAM(S): 5 TABLET ORAL at 21:05

## 2018-07-15 RX ADMIN — OXYCODONE HYDROCHLORIDE 5 MILLIGRAM(S): 5 TABLET ORAL at 21:35

## 2018-07-15 RX ADMIN — Medication 0.5 MILLIGRAM(S): at 09:22

## 2018-07-15 RX ADMIN — Medication 325 MILLIGRAM(S): at 07:53

## 2018-07-15 RX ADMIN — Medication 50 MILLIGRAM(S): at 05:04

## 2018-07-15 RX ADMIN — CLOPIDOGREL BISULFATE 75 MILLIGRAM(S): 75 TABLET, FILM COATED ORAL at 07:54

## 2018-07-15 RX ADMIN — Medication 100 MILLIGRAM(S): at 10:07

## 2018-07-15 RX ADMIN — SODIUM CHLORIDE 3 MILLILITER(S): 9 INJECTION INTRAMUSCULAR; INTRAVENOUS; SUBCUTANEOUS at 21:20

## 2018-07-15 RX ADMIN — Medication 1 MILLIGRAM(S): at 07:53

## 2018-07-15 RX ADMIN — AMLODIPINE BESYLATE 10 MILLIGRAM(S): 2.5 TABLET ORAL at 05:04

## 2018-07-15 RX ADMIN — PANTOPRAZOLE SODIUM 40 MILLIGRAM(S): 20 TABLET, DELAYED RELEASE ORAL at 05:08

## 2018-07-15 RX ADMIN — Medication 50 MILLIGRAM(S): at 16:57

## 2018-07-15 RX ADMIN — OXYCODONE HYDROCHLORIDE 5 MILLIGRAM(S): 5 TABLET ORAL at 10:59

## 2018-07-15 RX ADMIN — SODIUM CHLORIDE 3 MILLILITER(S): 9 INJECTION INTRAMUSCULAR; INTRAVENOUS; SUBCUTANEOUS at 05:01

## 2018-07-15 NOTE — PROGRESS NOTE ADULT - SUBJECTIVE AND OBJECTIVE BOX
Subjective:  at bedside, pt. examined & seen. Pt. denies any chest pain, reports slight LAYNE upon ambulation with physical therapist in [place].    VITAL SIGNS  Vital Signs Last 24 Hrs  T(C): 37 (07-15-18 @ 10:00), Max: 37.1 (18 @ 21:54)  T(F): 98.6 (07-15-18 @ 10:00), Max: 98.7 (18 @ 21:54)  HR: 85 (07-15-18 @ 10:00) (76 - 94)  BP: 100/52 (07-15-18 @ 10:00) (100/52 - 129/76)  RR: 22 (07-15-18 @ 10:00) (18 - 23)  SpO2: 92% (07-15-18 @ 10:00) (89% - 98%)  on (O2)              Telemetry:  Sinus rhythm  LVEF: 60%    MEDICATIONS  ALBUTerol/ipratropium for Nebulization 3 milliLiter(s) Nebulizer every 6 hours  amLODIPine   Tablet 10 milliGRAM(s) Oral daily  aspirin enteric coated 325 milliGRAM(s) Oral daily  atorvastatin 40 milliGRAM(s) Oral at bedtime  buDESOnide   0.5 milliGRAM(s) Respule 0.5 milliGRAM(s) Inhalation every 12 hours  clopidogrel Tablet 75 milliGRAM(s) Oral daily  enoxaparin Injectable 40 milliGRAM(s) SubCutaneous daily  ferrous    sulfate 325 milliGRAM(s) Oral three times a day  folic acid 1 milliGRAM(s) Oral daily  metoprolol tartrate 50 milliGRAM(s) Oral every 12 hours  multivitamin 1 Tablet(s) Oral daily  oxyCODONE    IR 5 milliGRAM(s) Oral every 4 hours PRN  oxyCODONE    IR 10 milliGRAM(s) Oral every 4 hours PRN  pantoprazole    Tablet 40 milliGRAM(s) Oral daily  sodium chloride 0.9% lock flush 3 milliLiter(s) IV Push every 8 hours  thiamine 100 milliGRAM(s) Oral daily      PHYSICAL EXAM  General: well nourished, well developed, no acute distress  Neurology: alert and oriented x 3, nonfocal, no gross deficits  Respiratory: Diminished at the bases.   CV: regular rate and rhythm, normal S1, S2  Abdomen: soft, nontender, nondistended, positive bowel sounds, last bowel movement 18, ecchymosis with bleeding noted on 4x4 to RLQ secondary to SC injection.  Extremities: warm, well perfused. no edema. + DP pulses  Incisions: Midline sternal incision, + Mepilex, CDI.  Sternum stable. LSVG incision CDI with +Mepilex.      I&O's Detail    2018 07:01  -  15 Jul 2018 07:00  --------------------------------------------------------  IN:    Oral Fluid: 940 mL  Total IN: 940 mL    OUT:  Total OUT: 0 mL    Total NET: 940 mL          Weights:  Daily     Daily Weight in k.6 (15 Jul 2018 06:00)  Admit Wt: Drug Dosing Weight  Height (cm): 152 (2018 13:20)  Weight (kg): 68 (2018 01:08)  BMI (kg/m2): 29.4 (2018 13:20)  BSA (m2): 1.65 (2018 13:20)    LABS  07-15    136  |  96<L>  |  18.0  ----------------------------<  114  4.5   |  24.0  |  0.58    Ca    8.6      15 Jul 2018 05:39  Mg     2.0     07-15                                   9.2    15.2  )-----------( 264      ( 15 Jul 2018 05:39 )             28.5                  CAPILLARY BLOOD GLUCOSE    Today's CXR:< from: Xray Chest 1 View AP/PA. (07.15.18 @ 07:05) >   EXAM:  XR CHEST AP OR PA 1V                          PROCEDURE DATE:  07/15/2018          INTERPRETATION:  History: Status post open-heart surgery.    Findings: Frontal chest.    Comparison: 2018.    Interval removal right internal jugular venous catheter.    Sternotomy sutures and CABG clips again noted. The heart is enlarged, may   be exaggerated by AP technique. Calcified uncoiled aorta. Unchanged   bibasilar pleural effusions. Overlying atelectasis and or infiltrates   cannot be excluded.    Impression:    Status post CABG. Interval removal right internal jugular venous   catheter. Otherwise, no significant interval change.    < end of copied text >          PAST MEDICAL & SURGICAL HISTORY:  HTN (hypertension)  No significant past surgical history

## 2018-07-15 NOTE — PROGRESS NOTE ADULT - PROBLEM SELECTOR PLAN 1
s/p C4L  Continue Percocet PO PRN pain management.  Continue ASA, Plavix, Lipitor, & Lopressor for CAD.  Encourage the use of I/S, CDBE, OOB to chair, ambulate with assist, daily PT.  Titrate oxygen to maintain SPO2 >90%; continue Duonebs & Pulmicort.  Possible to discharge to home tomorrow.  Discussed plan with Dr. Johns & CTS in morning rounds.

## 2018-07-16 ENCOUNTER — TRANSCRIPTION ENCOUNTER (OUTPATIENT)
Age: 73
End: 2018-07-16

## 2018-07-16 VITALS
TEMPERATURE: 98 F | OXYGEN SATURATION: 96 % | HEART RATE: 84 BPM | RESPIRATION RATE: 17 BRPM | SYSTOLIC BLOOD PRESSURE: 96 MMHG | DIASTOLIC BLOOD PRESSURE: 51 MMHG

## 2018-07-16 LAB
ALBUMIN SERPL ELPH-MCNC: 3.3 G/DL — SIGNIFICANT CHANGE UP (ref 3.3–5.2)
ALP SERPL-CCNC: 59 U/L — SIGNIFICANT CHANGE UP (ref 40–120)
ALT FLD-CCNC: 21 U/L — SIGNIFICANT CHANGE UP
ANION GAP SERPL CALC-SCNC: 11 MMOL/L — SIGNIFICANT CHANGE UP (ref 5–17)
ANION GAP SERPL CALC-SCNC: 12 MMOL/L — SIGNIFICANT CHANGE UP (ref 5–17)
AST SERPL-CCNC: 17 U/L — SIGNIFICANT CHANGE UP
BILIRUB SERPL-MCNC: 1.1 MG/DL — SIGNIFICANT CHANGE UP (ref 0.4–2)
BUN SERPL-MCNC: 16 MG/DL — SIGNIFICANT CHANGE UP (ref 8–20)
BUN SERPL-MCNC: 16 MG/DL — SIGNIFICANT CHANGE UP (ref 8–20)
CALCIUM SERPL-MCNC: 8.4 MG/DL — LOW (ref 8.6–10.2)
CALCIUM SERPL-MCNC: 8.5 MG/DL — LOW (ref 8.6–10.2)
CHLORIDE SERPL-SCNC: 98 MMOL/L — SIGNIFICANT CHANGE UP (ref 98–107)
CHLORIDE SERPL-SCNC: 99 MMOL/L — SIGNIFICANT CHANGE UP (ref 98–107)
CO2 SERPL-SCNC: 25 MMOL/L — SIGNIFICANT CHANGE UP (ref 22–29)
CO2 SERPL-SCNC: 25 MMOL/L — SIGNIFICANT CHANGE UP (ref 22–29)
CREAT SERPL-MCNC: 0.48 MG/DL — LOW (ref 0.5–1.3)
CREAT SERPL-MCNC: 0.53 MG/DL — SIGNIFICANT CHANGE UP (ref 0.5–1.3)
GLUCOSE SERPL-MCNC: 105 MG/DL — SIGNIFICANT CHANGE UP (ref 70–115)
GLUCOSE SERPL-MCNC: 107 MG/DL — SIGNIFICANT CHANGE UP (ref 70–115)
HCT VFR BLD CALC: 26.9 % — LOW (ref 37–47)
HGB BLD-MCNC: 8.7 G/DL — LOW (ref 12–16)
MAGNESIUM SERPL-MCNC: 2 MG/DL — SIGNIFICANT CHANGE UP (ref 1.6–2.6)
MCHC RBC-ENTMCNC: 30.9 PG — SIGNIFICANT CHANGE UP (ref 27–31)
MCHC RBC-ENTMCNC: 32.3 G/DL — SIGNIFICANT CHANGE UP (ref 32–36)
MCV RBC AUTO: 95.4 FL — SIGNIFICANT CHANGE UP (ref 81–99)
PHOSPHATE SERPL-MCNC: 3.7 MG/DL — SIGNIFICANT CHANGE UP (ref 2.4–4.7)
PLATELET # BLD AUTO: 260 K/UL — SIGNIFICANT CHANGE UP (ref 150–400)
POTASSIUM SERPL-MCNC: 4.6 MMOL/L — SIGNIFICANT CHANGE UP (ref 3.5–5.3)
POTASSIUM SERPL-MCNC: 5.1 MMOL/L — SIGNIFICANT CHANGE UP (ref 3.5–5.3)
POTASSIUM SERPL-SCNC: 4.6 MMOL/L — SIGNIFICANT CHANGE UP (ref 3.5–5.3)
POTASSIUM SERPL-SCNC: 5.1 MMOL/L — SIGNIFICANT CHANGE UP (ref 3.5–5.3)
PROT SERPL-MCNC: 5.5 G/DL — LOW (ref 6.6–8.7)
RBC # BLD: 2.82 M/UL — LOW (ref 4.4–5.2)
RBC # FLD: 12.9 % — SIGNIFICANT CHANGE UP (ref 11–15.6)
SODIUM SERPL-SCNC: 134 MMOL/L — LOW (ref 135–145)
SODIUM SERPL-SCNC: 136 MMOL/L — SIGNIFICANT CHANGE UP (ref 135–145)
WBC # BLD: 15.9 K/UL — HIGH (ref 4.8–10.8)
WBC # FLD AUTO: 15.9 K/UL — HIGH (ref 4.8–10.8)

## 2018-07-16 PROCEDURE — 71045 X-RAY EXAM CHEST 1 VIEW: CPT | Mod: 26

## 2018-07-16 RX ORDER — FOLIC ACID 0.8 MG
1 TABLET ORAL
Qty: 30 | Refills: 0
Start: 2018-07-16 | End: 2018-08-14

## 2018-07-16 RX ORDER — CLOPIDOGREL BISULFATE 75 MG/1
1 TABLET, FILM COATED ORAL
Qty: 0 | Refills: 0 | COMMUNITY
Start: 2018-07-16

## 2018-07-16 RX ORDER — ASPIRIN/CALCIUM CARB/MAGNESIUM 324 MG
1 TABLET ORAL
Qty: 0 | Refills: 0 | COMMUNITY
Start: 2018-07-16

## 2018-07-16 RX ORDER — BUDESONIDE, MICRONIZED 100 %
2 POWDER (GRAM) MISCELLANEOUS
Qty: 1 | Refills: 0
Start: 2018-07-16 | End: 2018-08-14

## 2018-07-16 RX ORDER — FERROUS SULFATE 325(65) MG
1 TABLET ORAL
Qty: 90 | Refills: 0
Start: 2018-07-16 | End: 2018-08-14

## 2018-07-16 RX ORDER — AMLODIPINE BESYLATE 2.5 MG/1
1 TABLET ORAL
Qty: 30 | Refills: 0
Start: 2018-07-16 | End: 2018-08-14

## 2018-07-16 RX ORDER — PANTOPRAZOLE SODIUM 20 MG/1
1 TABLET, DELAYED RELEASE ORAL
Qty: 0 | Refills: 0 | COMMUNITY
Start: 2018-07-16

## 2018-07-16 RX ORDER — THIAMINE MONONITRATE (VIT B1) 100 MG
1 TABLET ORAL
Qty: 30 | Refills: 0
Start: 2018-07-16 | End: 2018-08-14

## 2018-07-16 RX ORDER — AMLODIPINE BESYLATE 2.5 MG/1
1 TABLET ORAL
Qty: 0 | Refills: 0 | COMMUNITY
Start: 2018-07-16

## 2018-07-16 RX ORDER — ATORVASTATIN CALCIUM 80 MG/1
1 TABLET, FILM COATED ORAL
Qty: 30 | Refills: 0
Start: 2018-07-16 | End: 2018-08-14

## 2018-07-16 RX ORDER — THIAMINE MONONITRATE (VIT B1) 100 MG
1 TABLET ORAL
Qty: 0 | Refills: 0 | COMMUNITY
Start: 2018-07-16

## 2018-07-16 RX ORDER — METOPROLOL TARTRATE 50 MG
1 TABLET ORAL
Qty: 0 | Refills: 0 | COMMUNITY
Start: 2018-07-16

## 2018-07-16 RX ORDER — IPRATROPIUM/ALBUTEROL SULFATE 18-103MCG
1 AEROSOL WITH ADAPTER (GRAM) INHALATION
Qty: 1 | Refills: 0
Start: 2018-07-16 | End: 2018-08-14

## 2018-07-16 RX ORDER — OXYCODONE HYDROCHLORIDE 5 MG/1
1 TABLET ORAL
Qty: 42 | Refills: 0 | OUTPATIENT
Start: 2018-07-16 | End: 2018-07-22

## 2018-07-16 RX ORDER — AMLODIPINE BESYLATE 2.5 MG/1
1 TABLET ORAL
Qty: 0 | Refills: 0 | COMMUNITY

## 2018-07-16 RX ORDER — ASPIRIN/CALCIUM CARB/MAGNESIUM 324 MG
1 TABLET ORAL
Qty: 30 | Refills: 0
Start: 2018-07-16 | End: 2018-08-14

## 2018-07-16 RX ORDER — BISOPROLOL FUMARATE 10 MG/1
1 TABLET, FILM COATED ORAL
Qty: 0 | Refills: 0 | COMMUNITY

## 2018-07-16 RX ORDER — ATORVASTATIN CALCIUM 80 MG/1
1 TABLET, FILM COATED ORAL
Qty: 0 | Refills: 0 | COMMUNITY
Start: 2018-07-16

## 2018-07-16 RX ORDER — METOPROLOL TARTRATE 50 MG
1 TABLET ORAL
Qty: 60 | Refills: 0
Start: 2018-07-16 | End: 2018-08-14

## 2018-07-16 RX ORDER — CLOPIDOGREL BISULFATE 75 MG/1
1 TABLET, FILM COATED ORAL
Qty: 30 | Refills: 0
Start: 2018-07-16 | End: 2018-08-14

## 2018-07-16 RX ORDER — FOLIC ACID 0.8 MG
1 TABLET ORAL
Qty: 0 | Refills: 0 | COMMUNITY
Start: 2018-07-16

## 2018-07-16 RX ORDER — PANTOPRAZOLE SODIUM 20 MG/1
1 TABLET, DELAYED RELEASE ORAL
Qty: 14 | Refills: 0
Start: 2018-07-16 | End: 2018-07-29

## 2018-07-16 RX ADMIN — Medication 1 TABLET(S): at 11:33

## 2018-07-16 RX ADMIN — Medication 3 MILLILITER(S): at 09:49

## 2018-07-16 RX ADMIN — Medication 325 MILLIGRAM(S): at 05:28

## 2018-07-16 RX ADMIN — CLOPIDOGREL BISULFATE 75 MILLIGRAM(S): 75 TABLET, FILM COATED ORAL at 11:33

## 2018-07-16 RX ADMIN — Medication 325 MILLIGRAM(S): at 13:08

## 2018-07-16 RX ADMIN — OXYCODONE HYDROCHLORIDE 5 MILLIGRAM(S): 5 TABLET ORAL at 12:06

## 2018-07-16 RX ADMIN — SODIUM CHLORIDE 3 MILLILITER(S): 9 INJECTION INTRAMUSCULAR; INTRAVENOUS; SUBCUTANEOUS at 13:07

## 2018-07-16 RX ADMIN — Medication 3 MILLILITER(S): at 02:59

## 2018-07-16 RX ADMIN — Medication 325 MILLIGRAM(S): at 11:33

## 2018-07-16 RX ADMIN — Medication 0.5 MILLIGRAM(S): at 09:50

## 2018-07-16 RX ADMIN — OXYCODONE HYDROCHLORIDE 5 MILLIGRAM(S): 5 TABLET ORAL at 05:31

## 2018-07-16 RX ADMIN — Medication 1 MILLIGRAM(S): at 11:33

## 2018-07-16 RX ADMIN — OXYCODONE HYDROCHLORIDE 5 MILLIGRAM(S): 5 TABLET ORAL at 06:05

## 2018-07-16 RX ADMIN — PANTOPRAZOLE SODIUM 40 MILLIGRAM(S): 20 TABLET, DELAYED RELEASE ORAL at 05:28

## 2018-07-16 RX ADMIN — OXYCODONE HYDROCHLORIDE 5 MILLIGRAM(S): 5 TABLET ORAL at 11:36

## 2018-07-16 RX ADMIN — AMLODIPINE BESYLATE 10 MILLIGRAM(S): 2.5 TABLET ORAL at 05:28

## 2018-07-16 RX ADMIN — Medication 50 MILLIGRAM(S): at 05:28

## 2018-07-16 RX ADMIN — Medication 100 MILLIGRAM(S): at 11:33

## 2018-07-16 RX ADMIN — SODIUM CHLORIDE 3 MILLILITER(S): 9 INJECTION INTRAMUSCULAR; INTRAVENOUS; SUBCUTANEOUS at 05:06

## 2018-07-16 NOTE — DISCHARGE NOTE ADULT - CARE PROVIDER_API CALL
Uriah Manuel), Surgery; Thoracic and Cardiac Surgery  [address] [place] [address]  Phone: [phone]  Fax: [phone]    Ajit Koenig), Cardiac Electrophysiology; Cardiology; Internal Medicine  [address] [place] [id number]  Phone: [phone]  Fax: [phone]

## 2018-07-16 NOTE — DISCHARGE NOTE ADULT - INSTRUCTIONS
Do not exceed >1L/day. Daily weights to be obtained & documented on log sheet. Elija pavithra magras y aves sin piel y prepararlos sin grasa añadida saturadas y trans. Coma pescado al menos dos veces a la semana. Investigaciones recientes muestran que el consumo de pescado remedios contiene ácidos grasos omega-3 (por ejemplo, el salmón, la trucha y arenque) puede ayudar a reducir el riesgo de muerte por enfermedad coronaria. Seleccionar vincent de grasa, grasa 1 por ciento y productos lácteos bajos en grasa. Reduzca el consumo de alimentos que contienen aceites vegetales parcialmente hidrogenados para reducir las grasas trans en israel dieta. Para reducir el colesterol, reducir la grasa saturada a no más de 5 a 6 por ciento del total de calorías. Para alguien que come 2.000 calorías al día, que es alrededor de 13 gramos de grasa saturada. Reduzca el consumo de bebidas y alimentos con azúcares añadidos. Elija y prepare alimentos con poca o nada de lindsey. Para reducir la presión arterial, tratar de comer no más de 2.400 miligramos de sodio por día. La reducción de la ingesta diaria a 1.500 mg es deseable, ya que puede reducir aún más la presión arterial. Si usted grady alcohol, beber con moderación. Eso significa que helen bebida al día si usted es helen selin y dos bebidas al día si usted es un hombre.

## 2018-07-16 NOTE — DISCHARGE NOTE ADULT - ADDITIONAL INSTRUCTIONS
1. Daily Shower  2. Weight yourself daily and notify any weight gain greater than 2-3 pounds in 24 hours.  3. Regular diet - low fat, low cholesterol, no added salt.  4. Cleanse midsternal & leg incision daily while showering with warm water and mild soap, pat dry and maintain open to air.   DO NOT APPLY ANY LOTION, CREAMS, OR POWDERS TO INCISIONS, KEEP OPEN TO AIR  5. No driving until cleared by MD.   6. No heavy lifting nothing greater than 5 pounds until cleared by MD.   7. Call / Notify MD any fever greater than 101.0  8. Increase Activity as tolerated.  9. Utilize heart pillow to splint pillow

## 2018-07-16 NOTE — PROGRESS NOTE ADULT - PROVIDER SPECIALTY LIST ADULT
CT Surgery
Cardiology
Internal Medicine
Pulmonology
CT Surgery
CT Surgery

## 2018-07-16 NOTE — PROGRESS NOTE ADULT - PROBLEM SELECTOR PROBLEM 5
Anemia due to blood loss
Anemia due to blood loss
Essential hypertension
Hyperglycemia
Hyperglycemia

## 2018-07-16 NOTE — PROGRESS NOTE ADULT - SUBJECTIVE AND OBJECTIVE BOX
Subjective:  Tiffany at the bedside, pt. seen & examined, pt. denies any SOB or chest pain, NAD noted.    VITAL SIGNS  Vital Signs Last 24 Hrs  T(C): 37 (07-16-18 @ 06:18), Max: 37 (07-16-18 @ 06:18)  T(F): 98.6 (07-16-18 @ 06:18), Max: 98.6 (07-16-18 @ 06:18)  HR: 82 (07-16-18 @ 06:18) (82 - 84)  BP: 122/60 (07-16-18 @ 06:18) (102/68 - 122/60)  RR: 18 (07-16-18 @ 06:18) (17 - 23)  SpO2: 93% (07-16-18 @ 06:18) (91% - 94%)  on (O2)              Telemetry: Sinus rhythm  LVEF: 60%    MEDICATIONS  ALBUTerol/ipratropium for Nebulization 3 milliLiter(s) Nebulizer every 6 hours  amLODIPine   Tablet 10 milliGRAM(s) Oral daily  aspirin enteric coated 325 milliGRAM(s) Oral daily  atorvastatin 40 milliGRAM(s) Oral at bedtime  buDESOnide   0.5 milliGRAM(s) Respule 0.5 milliGRAM(s) Inhalation every 12 hours  clopidogrel Tablet 75 milliGRAM(s) Oral daily  enoxaparin Injectable 40 milliGRAM(s) SubCutaneous daily  ferrous    sulfate 325 milliGRAM(s) Oral three times a day  folic acid 1 milliGRAM(s) Oral daily  metoprolol tartrate 50 milliGRAM(s) Oral every 12 hours  multivitamin 1 Tablet(s) Oral daily  oxyCODONE    IR 5 milliGRAM(s) Oral every 4 hours PRN  oxyCODONE    IR 10 milliGRAM(s) Oral every 4 hours PRN  pantoprazole    Tablet 40 milliGRAM(s) Oral daily  sodium chloride 0.9% lock flush 3 milliLiter(s) IV Push every 8 hours  thiamine 100 milliGRAM(s) Oral daily      PHYSICAL EXAM  General: well nourished, well developed, no acute distress  Neurology: alert and oriented x 3, nonfocal, no gross deficits  Respiratory: Diminished at the bases.   CV: regular rate and rhythm, normal S1, S2  Abdomen: soft, nontender, nondistended, positive bowel sounds, last bowel movement 7/14/18, ecchymosis with bleeding noted on 4x4 to RLQ secondary to SC injection.  Extremities: warm, well perfused. no edema. + DP pulses  Incisions: Midline sternal incision, + Prevana dressing, CDI.  Sternum stable. LSVG incision CDI with +Mepilex.      I&O's Detail    15 Jul 2018 07:01  -  16 Jul 2018 07:00  --------------------------------------------------------  IN:    Oral Fluid: 480 mL  Total IN: 480 mL    OUT:    Voided: 850 mL  Total OUT: 850 mL    Total NET: -370 mL          Weights:  Daily     Daily   Admit Wt: Drug Dosing Weight  Height (cm): 152 (11 Jul 2018 13:20)  Weight (kg): 68 (06 Jul 2018 01:08)  BMI (kg/m2): 29.4 (11 Jul 2018 13:20)  BSA (m2): 1.65 (11 Jul 2018 13:20)    LABS  07-16    136  |  99  |  16.0  ----------------------------<  105  4.6   |  25.0  |  0.48<L>    Ca    8.4<L>      16 Jul 2018 03:43  Phos  3.7     07-16  Mg     2.0     07-16    TPro  5.5<L>  /  Alb  3.3  /  TBili  1.1  /  DBili  x   /  AST  17  /  ALT  21  /  AlkPhos  59  07-16                                 8.7    15.9  )-----------( 260      ( 16 Jul 2018 03:45 )             26.9                Bilirubin Total, Serum: 1.1 mg/dL (07-16 @ 03:42)    CAPILLARY BLOOD GLUCOSE    Today's CXR:< from: Xray Chest 1 View AP/PA. (07.15.18 @ 07:05) >  EXAM:  XR CHEST AP OR PA 1V                          PROCEDURE DATE:  07/15/2018          INTERPRETATION:  History: Status post open-heart surgery.    Findings: Frontal chest.    Comparison: 7/14/2018.    Interval removal right internal jugular venous catheter.    Sternotomy sutures and CABG clips again noted. The heart is enlarged, may   be exaggerated by AP technique. Calcified uncoiled aorta. Unchanged   bibasilar pleural effusions. Overlying atelectasis and or infiltrates   cannot be excluded.    Impression:    Status post CABG. Interval removal right internal jugular venous   catheter. Otherwise, no significant interval change.      EMELINA VAZQUEZ M.D. ATTENDING RADIOLOGIST  This document has been electronically signed. Jul 15 2018 10:30AM        < end of copied text >      Today's EKG:< from: 12 Lead ECG (07.13.18 @ 04:16) >  Ventricular Rate 86 BPM    Atrial Rate 86 BPM    P-R Interval 166 ms    QRS Duration 86 ms    Q-T Interval 366 ms    QTC Calculation(Bezet) 437 ms    P Axis 36 degrees    R Axis -1 degrees    T Axis 13 degrees    Diagnosis Line Normal sinus rhythm  Normal ECG    Confirmed by SALVATORE GORE ([id number]) on 7/13/2018 11:13:39 AM    < end of copied text >      PAST MEDICAL & SURGICAL HISTORY:  HTN (hypertension)  No significant past surgical history

## 2018-07-16 NOTE — DISCHARGE NOTE ADULT - NS AS ACTIVITY OBS
Walking-Outdoors allowed/Do not make important decisions/No Heavy lifting/straining/Stairs allowed/Showering allowed/Walking-Indoors allowed

## 2018-07-16 NOTE — PROGRESS NOTE ADULT - ASSESSMENT
Tiny Couch is a 73 year old female with significant PMH of HTN, CAD, and active smoker (since age 17, ~ 1  per 3 days), initially presented to [facility] on 7/6/18 with unstable angina, ruled in NSTEMI, LHC showed TVD, at which time CT surgery was consulted. Preop, beta blocker was initially withheld due to bradycardia and she had chest pain found to be anxiety ridden with negative work up. 7/11 s/p CABG x 4 (LIMA-LAD, SVG-OM, sequential SVG- RCA/PDA, with left saphenous EVH.  7/11- Extubated with PaO2 60s-70s, given history of active smoking hypoxic respiratory issues expected, treated with duonebs and solucortef given wheezing, hypoxemic (SpO2 sustaining at 89-90%) so HF NC was started, Cardene transitioned to NTG gtt given possibility of shunting contributing to hypoxemia, autodiuresing  7/12- Solucortef completed, seen by Pulmonary Symbicort added

## 2018-07-16 NOTE — PROGRESS NOTE ADULT - PROBLEM SELECTOR PLAN 5
Continue Lopressor and amlodipine
LORRAINE HOLBROOK
Trend H/H  Continue FeSO4, Folate, MVI, & Vit B12
Trend H/H  Continue FeSO4, Folate, MVI, & Vit B12
Titrate insulin gtt per protocol, transition to premeal per protocol, Endocrine consult and diabetes education

## 2018-07-16 NOTE — DISCHARGE NOTE ADULT - CARE PLAN
Principal Discharge DX:	Coronary artery disease involving native coronary artery of native heart with unstable angina pectoris  Goal:	recover from surgery  Assessment and plan of treatment:	Follow up with Dr. Manuel on 8/7 at 12:15pm. The cardiac surgery office is on the second floor of [facility]. Por favor llame a la oficina de [facility] al [phone] si usted está experimentando cualquier dificultad para respirar, dolor de pecho, fiebre o escalofríos, drenaje de la incisión , náuseas , vómitos o si tiene alguna pregunta sobre cuauhtemoc medicamentos. Si los síntomas son graves , llame al 911 y vaya al hospital más [facility]. Elija pavithra magras y aves sin piel y prepararlos sin grasa añadida saturadas y trans. Coma pescado al menos dos veces a la semana. Investigaciones recientes muestran que el consumo de pescado remedios contiene ácidos grasos omega-3 (por ejemplo, el salmón, la trucha y arenque) puede ayudar a reducir el riesgo de muerte por enfermedad coronaria. Seleccionar vincent de grasa, grasa 1 por ciento y productos lácteos bajos en grasa. Reduzca el consumo de alimentos que contienen aceites vegetales parcialmente hidrogenados para reducir las grasas trans en israel dieta. Para reducir el colesterol, reducir la grasa saturada a no más de 5 a 6 por ciento del total de calorías. Para alguien que come 2.000 calorías al día, que es alrededor de 13 gramos de grasa saturada. Reduzca el consumo de bebidas y alimentos con azúcares añadidos. Elija y prepare alimentos con poca o nada de lindsey. Para reducir la presión arterial, tratar de comer no más de 2.400 miligramos de sodio por día. La reducción de la ingesta diaria a 1.500 mg es deseable, ya que puede reducir aún más la presión arterial. Si usted grady alcohol, beber con moderación. Eso significa que helen bebida al día si usted es helen selin y dos bebidas al día si usted es un hombre.      Take the [place] ("G") elevators to 2 and make a left.   Walk down to the second hallway on your left (before the double doors).   Sign says Cardiovascular and Thoracic Surgery. Turn left and walk down the long hallway.  The waiting room is through the double doors toward the end of the hallway.  Secondary Diagnosis:	HTN (hypertension)  Assessment and plan of treatment:	Continue Norvasc & Lopressor  Secondary Diagnosis:	Smoker  Assessment and plan of treatment:	Smoking cessation discussed with pt.

## 2018-07-16 NOTE — DISCHARGE NOTE ADULT - CONDITIONS AT DISCHARGE
1. Daily Shower  2. Weight yourself daily and notify any weight gain greater than 2-3 pounds in 24 hours.  3. Regular diet - low fat, low cholesterol, no added salt.  4. Cleanse midsternal & left leg incision daily while showering with warm water and mild soap (DOVE or IVORY), pat dry and maintain open to air.   DO NOT APPLY ANY LOTION, CREAMS, OR POWDERS TO INCISIONS, KEEP OPEN TO AIR  5. No driving until cleared by MD.   6. No heavy lifting nothing greater than 5 pounds until cleared by MD.   7. Call / Notify MD any fever greater than 101.0  8. Increase Activity as tolerated.  9. Utilize heart pillow to splint pillow

## 2018-07-16 NOTE — DISCHARGE NOTE ADULT - COMMUNITY RESOURCES
Talia Blanc [facility] [phone] Care Navigator Talia Blanc NP [facility] [phone] APPOINTMENT AT [facility] ON  7/25/2018 AT 2:30 PM.             Care Navigator Talia Blanc NP [phone] APPOINTMENT AT [facility] ON  7/25/2018 AT 2:30 PM WITH DR. CAMPOS.               Care Navigator Talia Blanc NP [phone]

## 2018-07-16 NOTE — DISCHARGE NOTE ADULT - CARE PROVIDERS DIRECT ADDRESSES
[email or web address]

## 2018-07-16 NOTE — DISCHARGE NOTE ADULT - PLAN OF CARE
recover from surgery Follow up with Dr. Manuel on 8/7 at 12:15pm. The cardiac surgery office is on the second floor of [facility]. Por favor llame a la oficina de [facility] al [phone] si usted está experimentando cualquier dificultad para respirar, dolor de pecho, fiebre o escalofríos, drenaje de la incisión , náuseas , vómitos o si tiene alguna pregunta sobre cuauhtemoc medicamentos. Si los síntomas son graves , llame al 911 y vaya al hospital más [facility]. Elija pavithra magras y aves sin piel y prepararlos sin grasa añadida saturadas y trans. Coma pescado al menos dos veces a la semana. Investigaciones recientes muestran que el consumo de pescado remedios contiene ácidos grasos omega-3 (por ejemplo, el salmón, la trucha y arenque) puede ayudar a reducir el riesgo de muerte por enfermedad coronaria. Seleccionar vincent de grasa, grasa 1 por ciento y productos lácteos bajos en grasa. Reduzca el consumo de alimentos que contienen aceites vegetales parcialmente hidrogenados para reducir las grasas trans en israel dieta. Para reducir el colesterol, reducir la grasa saturada a no más de 5 a 6 por ciento del total de calorías. Para alguien que come 2.000 calorías al día, que es alrededor de 13 gramos de grasa saturada. Reduzca el consumo de bebidas y alimentos con azúcares añadidos. Elija y prepare alimentos con poca o nada de lindsey. Para reducir la presión arterial, tratar de comer no más de 2.400 miligramos de sodio por día. La reducción de la ingesta diaria a 1.500 mg es deseable, ya que puede reducir aún más la presión arterial. Si usted grady alcohol, beber con moderación. Eso significa que helen bebida al día si usted es helen selin y dos bebidas al día si usted es un hombre.      Take the [place] ("G") elevators to 2 and make a left.   Walk down to the second hallway on your left (before the double doors).   Sign says Cardiovascular and Thoracic Surgery. Turn left and walk down the long hallway.  The waiting room is through the double doors toward the end of the hallway. Continue Norvasc & Lopressor Smoking cessation discussed with pt.

## 2018-07-16 NOTE — PROGRESS NOTE ADULT - PROBLEM SELECTOR PLAN 1
s/p C4L  Continue Percocet PO PRN pain management.  Continue ASA, Plavix, Lipitor, & Lopressor for CAD.  Encourage the use of I/S, CDBE, OOB to chair, ambulate with assist, daily PT.  Titrate oxygen to maintain SPO2 >90%; continue Duonebs & Pulmicort.  D/C Prevana dressing.  Possible to discharge to home today.  Discussed plan with Dr. Manuel & CTS in morning rounds.

## 2018-07-16 NOTE — DISCHARGE NOTE ADULT - MEDICATION SUMMARY - MEDICATIONS TO TAKE
I will START or STAY ON the medications listed below when I get home from the hospital:    budesonide 90 mcg/inh inhalation powder  -- 2 puff(s) inhaled every 12 hours   -- For inhalation only.  Rinse mouth thoroughly after use.    -- Indication: For Chronic Obstructive Pulmonary Diease    oxyCODONE 5 mg oral tablet  -- 1 tab(s) by mouth every 4 hours, As needed, Moderate Pain (4 - 6) MDD:6  -- Indication: For Pain management    aspirin 325 mg oral delayed release tablet  -- 1 tab(s) by mouth once a day  -- Indication: For Antiplatelet    atorvastatin 40 mg oral tablet  -- 1 tab(s) by mouth once a day (at bedtime)  -- Indication: For Dyslipidemia    clopidogrel 75 mg oral tablet  -- 1 tab(s) by mouth once a day  -- Indication: For Antiplatelet    metoprolol tartrate 50 mg oral tablet  -- 1 tab(s) by mouth every 12 hours  -- Indication: For Hypertension    Combivent Respimat 20 mcg-100 mcg/inh inhalation aerosol  -- 1 puff(s) inhaled every 6 hours   -- Check with your doctor before becoming pregnant.  For inhalation only.  May cause drowsiness or dizziness.  Obtain medical advice before taking any non-prescription drugs as some may affect the action of this medication.  This drug may impair the ability to drive or operate machinery.  Use care until you become familiar with its effects.    -- Indication: For Chronic Obstructive Pulmonary Diease    amLODIPine 10 mg oral tablet  -- 1 tab(s) by mouth once a day  -- Indication: For Hypertension    FeroSul 325 mg (65 mg elemental iron) oral tablet  -- 1 tab(s) by mouth 3 times a day   -- Check with your doctor before becoming pregnant.  Do not chew, break, or crush.  May discolor urine or feces.    -- Indication: For Anemia due to blood loss    pantoprazole 40 mg oral delayed release tablet  -- 1 tab(s) by mouth once a day  -- Indication: For GERD    Multiple Vitamins oral tablet  -- 1 tab(s) by mouth once a day  -- Indication: For Vitamin    thiamine 100 mg oral tablet  -- 1 tab(s) by mouth once a day  -- Indication: For Vitamin    folic acid 1 mg oral tablet  -- 1 tab(s) by mouth once a day  -- Indication: For Vitamin

## 2018-07-16 NOTE — DISCHARGE NOTE ADULT - PATIENT PORTAL LINK FT
You can access the [facility] Patient Portal, offered by [facility], by registering with the following website: [email or web address]

## 2018-07-17 PROBLEM — Z87.891 FORMER SMOKER: Status: ACTIVE | Noted: 2018-07-17

## 2018-07-17 RX ORDER — ATORVASTATIN CALCIUM 40 MG/1
40 TABLET, FILM COATED ORAL
Refills: 0 | Status: ACTIVE | COMMUNITY

## 2018-07-17 RX ORDER — ASPIRIN/ACETAMINOPHEN/CAFFEINE 500-325-65
325 POWDER IN PACKET (EA) ORAL
Refills: 0 | Status: ACTIVE | COMMUNITY

## 2018-07-17 RX ORDER — BUDESONIDE 0.25 MG/2ML
0.25 INHALANT ORAL
Refills: 0 | Status: ACTIVE | COMMUNITY

## 2018-07-17 RX ORDER — PANTOPRAZOLE SODIUM 40 MG/1
40 GRANULE, DELAYED RELEASE ORAL
Refills: 0 | Status: ACTIVE | COMMUNITY

## 2018-07-17 RX ORDER — AMLODIPINE BESYLATE 10 MG/1
10 TABLET ORAL
Refills: 0 | Status: ACTIVE | COMMUNITY

## 2018-07-17 RX ORDER — CLOPIDOGREL 75 MG/1
75 TABLET, FILM COATED ORAL
Refills: 0 | Status: ACTIVE | COMMUNITY

## 2018-07-17 RX ORDER — OXYCODONE HYDROCHLORIDE 5 MG/1
5 CAPSULE ORAL
Refills: 0 | Status: ACTIVE | COMMUNITY

## 2018-07-17 RX ORDER — METOPROLOL TARTRATE 50 MG/1
50 TABLET ORAL
Refills: 0 | Status: ACTIVE | COMMUNITY

## 2018-07-20 ENCOUNTER — INPATIENT (INPATIENT)
Facility: HOSPITAL | Age: 73
LOS: 0 days | Discharge: HOME | DRG: 204 | End: 2018-07-21
Attending: THORACIC SURGERY (CARDIOTHORACIC VASCULAR SURGERY) | Admitting: THORACIC SURGERY (CARDIOTHORACIC VASCULAR SURGERY)
Payer: MEDICAID

## 2018-07-20 VITALS
SYSTOLIC BLOOD PRESSURE: 149 MMHG | OXYGEN SATURATION: 96 % | HEART RATE: 79 BPM | RESPIRATION RATE: 18 BRPM | DIASTOLIC BLOOD PRESSURE: 74 MMHG

## 2018-07-20 DIAGNOSIS — Z95.1 PRESENCE OF AORTOCORONARY BYPASS GRAFT: Chronic | ICD-10-CM

## 2018-07-20 LAB
ALBUMIN SERPL ELPH-MCNC: 3.5 G/DL — SIGNIFICANT CHANGE UP (ref 3.3–5.2)
ALP SERPL-CCNC: 107 U/L — SIGNIFICANT CHANGE UP (ref 40–120)
ALT FLD-CCNC: 23 U/L — SIGNIFICANT CHANGE UP
ANION GAP SERPL CALC-SCNC: 17 MMOL/L — SIGNIFICANT CHANGE UP (ref 5–17)
ANISOCYTOSIS BLD QL: SLIGHT — SIGNIFICANT CHANGE UP
APTT BLD: 30.6 SEC — SIGNIFICANT CHANGE UP (ref 27.5–37.4)
AST SERPL-CCNC: 28 U/L — SIGNIFICANT CHANGE UP
BASOPHILS NFR BLD AUTO: 1 % — SIGNIFICANT CHANGE UP (ref 0–2)
BILIRUB SERPL-MCNC: 1.6 MG/DL — SIGNIFICANT CHANGE UP (ref 0.4–2)
BUN SERPL-MCNC: 8 MG/DL — SIGNIFICANT CHANGE UP (ref 8–20)
CALCIUM SERPL-MCNC: 8.7 MG/DL — SIGNIFICANT CHANGE UP (ref 8.6–10.2)
CHLORIDE SERPL-SCNC: 84 MMOL/L — LOW (ref 98–107)
CK SERPL-CCNC: 32 U/L — SIGNIFICANT CHANGE UP (ref 25–170)
CO2 SERPL-SCNC: 24 MMOL/L — SIGNIFICANT CHANGE UP (ref 22–29)
CREAT SERPL-MCNC: 0.46 MG/DL — LOW (ref 0.5–1.3)
GLUCOSE SERPL-MCNC: 127 MG/DL — HIGH (ref 70–115)
HCT VFR BLD CALC: 25.5 % — LOW (ref 37–47)
HGB BLD-MCNC: 8.5 G/DL — LOW (ref 12–16)
INR BLD: 1.15 RATIO — SIGNIFICANT CHANGE UP (ref 0.88–1.16)
LACTATE BLDV-MCNC: 1.1 MMOL/L — SIGNIFICANT CHANGE UP (ref 0.5–2)
LYMPHOCYTES # BLD AUTO: 13 % — LOW (ref 20–55)
MACROCYTES BLD QL: SLIGHT — SIGNIFICANT CHANGE UP
MCHC RBC-ENTMCNC: 30.9 PG — SIGNIFICANT CHANGE UP (ref 27–31)
MCHC RBC-ENTMCNC: 33.3 G/DL — SIGNIFICANT CHANGE UP (ref 32–36)
MCV RBC AUTO: 92.7 FL — SIGNIFICANT CHANGE UP (ref 81–99)
MICROCYTES BLD QL: SLIGHT — SIGNIFICANT CHANGE UP
MONOCYTES NFR BLD AUTO: 9 % — SIGNIFICANT CHANGE UP (ref 3–10)
NEUTROPHILS NFR BLD AUTO: 77 % — HIGH (ref 37–73)
PLAT MORPH BLD: NORMAL — SIGNIFICANT CHANGE UP
PLATELET # BLD AUTO: 377 K/UL — SIGNIFICANT CHANGE UP (ref 150–400)
POLYCHROMASIA BLD QL SMEAR: SLIGHT — SIGNIFICANT CHANGE UP
POTASSIUM SERPL-MCNC: 4.7 MMOL/L — SIGNIFICANT CHANGE UP (ref 3.5–5.3)
POTASSIUM SERPL-SCNC: 4.7 MMOL/L — SIGNIFICANT CHANGE UP (ref 3.5–5.3)
PROT SERPL-MCNC: 6.9 G/DL — SIGNIFICANT CHANGE UP (ref 6.6–8.7)
PROTHROM AB SERPL-ACNC: 12.7 SEC — SIGNIFICANT CHANGE UP (ref 9.8–12.7)
RBC # BLD: 2.75 M/UL — LOW (ref 4.4–5.2)
RBC # FLD: 13 % — SIGNIFICANT CHANGE UP (ref 11–15.6)
RBC BLD AUTO: SIGNIFICANT CHANGE UP
SODIUM SERPL-SCNC: 125 MMOL/L — LOW (ref 135–145)
TROPONIN T SERPL-MCNC: <0.01 NG/ML — SIGNIFICANT CHANGE UP (ref 0–0.06)
WBC # BLD: 15.8 K/UL — HIGH (ref 4.8–10.8)
WBC # FLD AUTO: 15.8 K/UL — HIGH (ref 4.8–10.8)

## 2018-07-20 PROCEDURE — 70450 CT HEAD/BRAIN W/O DYE: CPT | Mod: 26

## 2018-07-20 PROCEDURE — 99285 EMERGENCY DEPT VISIT HI MDM: CPT | Mod: 25

## 2018-07-20 PROCEDURE — 71045 X-RAY EXAM CHEST 1 VIEW: CPT | Mod: 26

## 2018-07-20 RX ORDER — SODIUM CHLORIDE 9 MG/ML
3 INJECTION INTRAMUSCULAR; INTRAVENOUS; SUBCUTANEOUS EVERY 8 HOURS
Qty: 0 | Refills: 0 | Status: DISCONTINUED | OUTPATIENT
Start: 2018-07-20 | End: 2018-07-21

## 2018-07-20 RX ADMIN — SODIUM CHLORIDE 3 MILLILITER(S): 9 INJECTION INTRAMUSCULAR; INTRAVENOUS; SUBCUTANEOUS at 23:41

## 2018-07-20 NOTE — ED ADULT NURSE NOTE - OBJECTIVE STATEMENT
Called Code Stroke. Pt c/o of confusion since 2200. Pt has no other deficits noted. Pt recently had CABG surgery.

## 2018-07-20 NOTE — ED ADULT TRIAGE NOTE - CHIEF COMPLAINT QUOTE
patient biba from home ems states that patient was at home when she began to slur words and not acting normal, last known well at 6pm, Dr. CAPELLAN at bedside for evaluation code stroke called patient moved to CT scan

## 2018-07-21 ENCOUNTER — TRANSCRIPTION ENCOUNTER (OUTPATIENT)
Age: 73
End: 2018-07-21

## 2018-07-21 VITALS
DIASTOLIC BLOOD PRESSURE: 56 MMHG | HEART RATE: 71 BPM | OXYGEN SATURATION: 97 % | RESPIRATION RATE: 25 BRPM | SYSTOLIC BLOOD PRESSURE: 105 MMHG

## 2018-07-21 DIAGNOSIS — Z95.1 PRESENCE OF AORTOCORONARY BYPASS GRAFT: ICD-10-CM

## 2018-07-21 DIAGNOSIS — R41.0 DISORIENTATION, UNSPECIFIED: ICD-10-CM

## 2018-07-21 DIAGNOSIS — R41.82 ALTERED MENTAL STATUS, UNSPECIFIED: ICD-10-CM

## 2018-07-21 DIAGNOSIS — R06.02 SHORTNESS OF BREATH: ICD-10-CM

## 2018-07-21 DIAGNOSIS — Z95.1 PRESENCE OF AORTOCORONARY BYPASS GRAFT: Chronic | ICD-10-CM

## 2018-07-21 DIAGNOSIS — I10 ESSENTIAL (PRIMARY) HYPERTENSION: ICD-10-CM

## 2018-07-21 DIAGNOSIS — E87.1 HYPO-OSMOLALITY AND HYPONATREMIA: ICD-10-CM

## 2018-07-21 LAB
ANION GAP SERPL CALC-SCNC: 17 MMOL/L — SIGNIFICANT CHANGE UP (ref 5–17)
APPEARANCE UR: CLEAR — SIGNIFICANT CHANGE UP
BACTERIA # UR AUTO: ABNORMAL
BILIRUB UR-MCNC: NEGATIVE — SIGNIFICANT CHANGE UP
BLD GP AB SCN SERPL QL: SIGNIFICANT CHANGE UP
BUN SERPL-MCNC: 7 MG/DL — LOW (ref 8–20)
CALCIUM SERPL-MCNC: 9 MG/DL — SIGNIFICANT CHANGE UP (ref 8.6–10.2)
CHLORIDE SERPL-SCNC: 89 MMOL/L — LOW (ref 98–107)
CK SERPL-CCNC: 23 U/L — LOW (ref 25–170)
CO2 SERPL-SCNC: 25 MMOL/L — SIGNIFICANT CHANGE UP (ref 22–29)
COLOR SPEC: YELLOW — SIGNIFICANT CHANGE UP
CREAT ?TM UR-MCNC: 14 MG/DL — SIGNIFICANT CHANGE UP
CREAT SERPL-MCNC: 0.5 MG/DL — SIGNIFICANT CHANGE UP (ref 0.5–1.3)
DIFF PNL FLD: NEGATIVE — SIGNIFICANT CHANGE UP
EPI CELLS # UR: SIGNIFICANT CHANGE UP
GLUCOSE SERPL-MCNC: 121 MG/DL — HIGH (ref 70–115)
GLUCOSE UR QL: NEGATIVE MG/DL — SIGNIFICANT CHANGE UP
KETONES UR-MCNC: NEGATIVE — SIGNIFICANT CHANGE UP
LEUKOCYTE ESTERASE UR-ACNC: ABNORMAL
NITRITE UR-MCNC: NEGATIVE — SIGNIFICANT CHANGE UP
NT-PROBNP SERPL-SCNC: 757 PG/ML — HIGH (ref 0–300)
OSMOLALITY SERPL: 281 MOSM/KG — SIGNIFICANT CHANGE UP (ref 280–300)
PH UR: 8 — SIGNIFICANT CHANGE UP (ref 5–8)
POTASSIUM SERPL-MCNC: 4.3 MMOL/L — SIGNIFICANT CHANGE UP (ref 3.5–5.3)
POTASSIUM SERPL-SCNC: 4.3 MMOL/L — SIGNIFICANT CHANGE UP (ref 3.5–5.3)
PROT UR-MCNC: NEGATIVE MG/DL — SIGNIFICANT CHANGE UP
RBC CASTS # UR COMP ASSIST: SIGNIFICANT CHANGE UP /HPF (ref 0–4)
SODIUM SERPL-SCNC: 131 MMOL/L — LOW (ref 135–145)
SODIUM UR-SCNC: <30 MMOL/L — SIGNIFICANT CHANGE UP
SP GR SPEC: 1.01 — SIGNIFICANT CHANGE UP (ref 1.01–1.02)
TROPONIN T SERPL-MCNC: <0.01 NG/ML — SIGNIFICANT CHANGE UP (ref 0–0.06)
TYPE + AB SCN PNL BLD: SIGNIFICANT CHANGE UP
UROBILINOGEN FLD QL: NEGATIVE MG/DL — SIGNIFICANT CHANGE UP
WBC UR QL: SIGNIFICANT CHANGE UP

## 2018-07-21 PROCEDURE — 86900 BLOOD TYPING SEROLOGIC ABO: CPT

## 2018-07-21 PROCEDURE — 85027 COMPLETE CBC AUTOMATED: CPT

## 2018-07-21 PROCEDURE — 81001 URINALYSIS AUTO W/SCOPE: CPT

## 2018-07-21 PROCEDURE — 85730 THROMBOPLASTIN TIME PARTIAL: CPT

## 2018-07-21 PROCEDURE — 87040 BLOOD CULTURE FOR BACTERIA: CPT

## 2018-07-21 PROCEDURE — 87186 SC STD MICRODIL/AGAR DIL: CPT

## 2018-07-21 PROCEDURE — 82962 GLUCOSE BLOOD TEST: CPT

## 2018-07-21 PROCEDURE — 83930 ASSAY OF BLOOD OSMOLALITY: CPT

## 2018-07-21 PROCEDURE — 36415 COLL VENOUS BLD VENIPUNCTURE: CPT

## 2018-07-21 PROCEDURE — 82550 ASSAY OF CK (CPK): CPT

## 2018-07-21 PROCEDURE — 80048 BASIC METABOLIC PNL TOTAL CA: CPT

## 2018-07-21 PROCEDURE — 83880 ASSAY OF NATRIURETIC PEPTIDE: CPT

## 2018-07-21 PROCEDURE — 83605 ASSAY OF LACTIC ACID: CPT

## 2018-07-21 PROCEDURE — 70450 CT HEAD/BRAIN W/O DYE: CPT

## 2018-07-21 PROCEDURE — 86901 BLOOD TYPING SEROLOGIC RH(D): CPT

## 2018-07-21 PROCEDURE — 86850 RBC ANTIBODY SCREEN: CPT

## 2018-07-21 PROCEDURE — 80053 COMPREHEN METABOLIC PANEL: CPT

## 2018-07-21 PROCEDURE — 82570 ASSAY OF URINE CREATININE: CPT

## 2018-07-21 PROCEDURE — 71045 X-RAY EXAM CHEST 1 VIEW: CPT

## 2018-07-21 PROCEDURE — 93010 ELECTROCARDIOGRAM REPORT: CPT | Mod: 76

## 2018-07-21 PROCEDURE — 99285 EMERGENCY DEPT VISIT HI MDM: CPT

## 2018-07-21 PROCEDURE — 87086 URINE CULTURE/COLONY COUNT: CPT

## 2018-07-21 PROCEDURE — 94640 AIRWAY INHALATION TREATMENT: CPT

## 2018-07-21 PROCEDURE — 84484 ASSAY OF TROPONIN QUANT: CPT

## 2018-07-21 PROCEDURE — 85610 PROTHROMBIN TIME: CPT

## 2018-07-21 PROCEDURE — 84300 ASSAY OF URINE SODIUM: CPT

## 2018-07-21 RX ORDER — DOCUSATE SODIUM 100 MG
100 CAPSULE ORAL THREE TIMES A DAY
Qty: 0 | Refills: 0 | Status: DISCONTINUED | OUTPATIENT
Start: 2018-07-21 | End: 2018-07-21

## 2018-07-21 RX ORDER — IPRATROPIUM/ALBUTEROL SULFATE 18-103MCG
3 AEROSOL WITH ADAPTER (GRAM) INHALATION EVERY 6 HOURS
Qty: 0 | Refills: 0 | Status: DISCONTINUED | OUTPATIENT
Start: 2018-07-21 | End: 2018-07-21

## 2018-07-21 RX ORDER — CLOPIDOGREL BISULFATE 75 MG/1
75 TABLET, FILM COATED ORAL DAILY
Qty: 0 | Refills: 0 | Status: DISCONTINUED | OUTPATIENT
Start: 2018-07-21 | End: 2018-07-21

## 2018-07-21 RX ORDER — FUROSEMIDE 40 MG
40 TABLET ORAL DAILY
Qty: 0 | Refills: 0 | Status: DISCONTINUED | OUTPATIENT
Start: 2018-07-21 | End: 2018-07-21

## 2018-07-21 RX ORDER — THIAMINE MONONITRATE (VIT B1) 100 MG
100 TABLET ORAL DAILY
Qty: 0 | Refills: 0 | Status: DISCONTINUED | OUTPATIENT
Start: 2018-07-21 | End: 2018-07-21

## 2018-07-21 RX ORDER — FUROSEMIDE 40 MG
1 TABLET ORAL
Qty: 14 | Refills: 0
Start: 2018-07-21 | End: 2018-08-03

## 2018-07-21 RX ORDER — FUROSEMIDE 40 MG
1 TABLET ORAL
Qty: 14 | Refills: 0 | OUTPATIENT
Start: 2018-07-21 | End: 2018-08-03

## 2018-07-21 RX ORDER — IPRATROPIUM/ALBUTEROL SULFATE 18-103MCG
1 AEROSOL WITH ADAPTER (GRAM) INHALATION
Qty: 0 | Refills: 0 | Status: DISCONTINUED | OUTPATIENT
Start: 2018-07-21 | End: 2018-07-21

## 2018-07-21 RX ORDER — FOLIC ACID 0.8 MG
1 TABLET ORAL DAILY
Qty: 0 | Refills: 0 | Status: DISCONTINUED | OUTPATIENT
Start: 2018-07-21 | End: 2018-07-21

## 2018-07-21 RX ORDER — FERROUS SULFATE 325(65) MG
325 TABLET ORAL THREE TIMES A DAY
Qty: 0 | Refills: 0 | Status: DISCONTINUED | OUTPATIENT
Start: 2018-07-21 | End: 2018-07-21

## 2018-07-21 RX ORDER — ENOXAPARIN SODIUM 100 MG/ML
40 INJECTION SUBCUTANEOUS DAILY
Qty: 0 | Refills: 0 | Status: DISCONTINUED | OUTPATIENT
Start: 2018-07-21 | End: 2018-07-21

## 2018-07-21 RX ORDER — SPIRONOLACTONE 25 MG/1
1 TABLET, FILM COATED ORAL
Qty: 14 | Refills: 0
Start: 2018-07-21 | End: 2018-08-03

## 2018-07-21 RX ORDER — ACETAMINOPHEN 500 MG
1000 TABLET ORAL ONCE
Qty: 0 | Refills: 0 | Status: COMPLETED | OUTPATIENT
Start: 2018-07-21 | End: 2018-07-21

## 2018-07-21 RX ORDER — BUDESONIDE, MICRONIZED 100 %
0.5 POWDER (GRAM) MISCELLANEOUS
Qty: 0 | Refills: 0 | Status: DISCONTINUED | OUTPATIENT
Start: 2018-07-21 | End: 2018-07-21

## 2018-07-21 RX ORDER — METOPROLOL TARTRATE 50 MG
50 TABLET ORAL EVERY 12 HOURS
Qty: 0 | Refills: 0 | Status: DISCONTINUED | OUTPATIENT
Start: 2018-07-21 | End: 2018-07-21

## 2018-07-21 RX ORDER — FUROSEMIDE 40 MG
20 TABLET ORAL ONCE
Qty: 0 | Refills: 0 | Status: COMPLETED | OUTPATIENT
Start: 2018-07-21 | End: 2018-07-21

## 2018-07-21 RX ORDER — ASPIRIN/CALCIUM CARB/MAGNESIUM 324 MG
325 TABLET ORAL DAILY
Qty: 0 | Refills: 0 | Status: DISCONTINUED | OUTPATIENT
Start: 2018-07-21 | End: 2018-07-21

## 2018-07-21 RX ORDER — ATORVASTATIN CALCIUM 80 MG/1
40 TABLET, FILM COATED ORAL AT BEDTIME
Qty: 0 | Refills: 0 | Status: DISCONTINUED | OUTPATIENT
Start: 2018-07-21 | End: 2018-07-21

## 2018-07-21 RX ORDER — AMLODIPINE BESYLATE 2.5 MG/1
10 TABLET ORAL DAILY
Qty: 0 | Refills: 0 | Status: DISCONTINUED | OUTPATIENT
Start: 2018-07-21 | End: 2018-07-21

## 2018-07-21 RX ORDER — PANTOPRAZOLE SODIUM 20 MG/1
40 TABLET, DELAYED RELEASE ORAL
Qty: 0 | Refills: 0 | Status: DISCONTINUED | OUTPATIENT
Start: 2018-07-21 | End: 2018-07-21

## 2018-07-21 RX ORDER — SPIRONOLACTONE 25 MG/1
25 TABLET, FILM COATED ORAL DAILY
Qty: 0 | Refills: 0 | Status: DISCONTINUED | OUTPATIENT
Start: 2018-07-21 | End: 2018-07-21

## 2018-07-21 RX ADMIN — Medication 0.5 MILLIGRAM(S): at 08:12

## 2018-07-21 RX ADMIN — ENOXAPARIN SODIUM 40 MILLIGRAM(S): 100 INJECTION SUBCUTANEOUS at 12:04

## 2018-07-21 RX ADMIN — PANTOPRAZOLE SODIUM 40 MILLIGRAM(S): 20 TABLET, DELAYED RELEASE ORAL at 08:12

## 2018-07-21 RX ADMIN — Medication 1 MILLIGRAM(S): at 12:00

## 2018-07-21 RX ADMIN — Medication 20 MILLIGRAM(S): at 05:47

## 2018-07-21 RX ADMIN — Medication 400 MILLIGRAM(S): at 02:01

## 2018-07-21 RX ADMIN — Medication 100 MILLIGRAM(S): at 06:41

## 2018-07-21 RX ADMIN — Medication 50 MILLIGRAM(S): at 08:13

## 2018-07-21 RX ADMIN — Medication 1 TABLET(S): at 12:00

## 2018-07-21 RX ADMIN — Medication 325 MILLIGRAM(S): at 14:02

## 2018-07-21 RX ADMIN — AMLODIPINE BESYLATE 10 MILLIGRAM(S): 2.5 TABLET ORAL at 06:41

## 2018-07-21 RX ADMIN — CLOPIDOGREL BISULFATE 75 MILLIGRAM(S): 75 TABLET, FILM COATED ORAL at 12:00

## 2018-07-21 RX ADMIN — Medication 1000 MILLIGRAM(S): at 02:15

## 2018-07-21 RX ADMIN — Medication 100 MILLIGRAM(S): at 14:02

## 2018-07-21 RX ADMIN — Medication 100 MILLIGRAM(S): at 12:00

## 2018-07-21 RX ADMIN — Medication 325 MILLIGRAM(S): at 06:41

## 2018-07-21 RX ADMIN — SODIUM CHLORIDE 3 MILLILITER(S): 9 INJECTION INTRAMUSCULAR; INTRAVENOUS; SUBCUTANEOUS at 05:41

## 2018-07-21 RX ADMIN — Medication 325 MILLIGRAM(S): at 12:00

## 2018-07-21 NOTE — H&P ADULT - NSHPREVIEWOFSYSTEMS_GEN_ALL_CORE
Review of Systems  CONSTITUTIONAL:  Fevers[ ] chills[ ] sweats[ ] fatigue[ ] weight loss[ ] weight gain [ ]                                     NEGATIVE [ ]   NEURO:  parathesias[ ] seizures [ ]  syncope [ ]  confusion [ ]                                                                                NEGATIVE[ ]   EYES: glasses[ ]  blurry vision[ ]  discharge[ ] pain[ ] glaucoma [ ]                                                                          NEGATIVE[ ]   ENMT:  difficulty hearing [ ]  vertigo[ ]  dysphagia[ ] epistaxis[ ] recent dental work [ ]                                    NEGATIVE[ ]   CV:  chest pain[ ] palpitations[ ] LAYNE [ ] diaphoresis [ ]                                                                                           NEGATIVE[ ]   RESPIRATORY:  wheezing[ ] SOB[ ] cough [ ] sputum[ ] hemoptysis[ ]                                                                  NEGATIVE[ ]   GI:  nausea[ ]  vommiting [ ]  diarrhea[ ] constipation [ ] melena [ ]                                                                      NEGATIVE[ ]   : hematuria[ ]  dysuria[ ] urgency[ ] incontinence[ ]                                                                                            NEGATIVE[ ]   MUSKULOSKELETAL:  arthritis[ ]  joint swelling [ ] muscle weakness [ ]                                                                NEGATIVE[ ]   SKIN/BREAST:  rash[ ] itching [ ]  hair loss[ ] masses[ ]                                                                                              NEGATIVE[ ]   PSYCH:  dementia [ ] depresion [ ] anxiety[ ]                                                                                                               NEGATIVE[ ]   HEME/LYMPH:  bruises easily[ ] enlarged lymph nodes[ ] tender lymph nodes[ ]                                               NEGATIVE[ ]   ENDOCRINE:  cold intolerance[ ] heat intolerance[ ] polydipsia[ ]                                                                          NEGATIVE[ ] Review of Systems  CONSTITUTIONAL:  denies Fevers[ ] chills[ ] sweats[ ] fatigue[ ] weight loss[ ] weight gain [ ]                                      NEURO: denies  parathesias[ ] seizures [ ]  syncope [ ]  confusion [ ]       + HA                                                                           EYES: denies glasses[ ]  blurry vision[ ]  discharge[ ] pain[ ] glaucoma [ ]                                                                         ENMT: denies  difficulty hearing [ ]  vertigo[ ]  dysphagia[ ] epistaxis[ ] recent dental work [ ]                                    CV:  chest pain[ ] palpitations[ ] LAYNE [ ] diaphoresis [ ]                                                                                          RESPIRATORY:  denies wheezing[ ] SOB[ ] cough [ ] sputum[ ] hemoptysis[ ]                                                                GI:  denies nausea[ ]  vomiting [ ]  diarrhea[ ] constipation [ ] melena [ ]                                                                        : denies hematuria[ ]  dysuria[ ] urgency[ ] incontinence[ ]                                                                                            MUSKULOSKELETAL:  denies arthritis[ ]  joint swelling [ ] muscle weakness [ ]                                                                 SKIN/BREAST: denies  rash[ ] itching [ ]  hair loss[ ] masses[ ]                                                                                                PSYCH: denies dementia [ ] depression [ ] anxiety[ ]                                                                                                                 HEME/LYMPH: denies bruises easily[ ] enlarged lymph nodes[ ] tender lymph nodes[ ]                                                 ENDOCRINE: denies cold intolerance[ ] heat intolerance[ ] polydipsia[ ]

## 2018-07-21 NOTE — ED PROVIDER NOTE - OBJECTIVE STATEMENT
CC: confusion  Presenting symptoms: 74yo female with h/o CABG 9 days ago and was home and had an episode of confusion at home, which is now resolved.  The patient was last seen normal 5 hours ago and then when her daughter went to give her percocet tonight she seemed confused.  Patient brought to ED for eval.  Code stroke called and then cancelled.  Pertinent Positives: confusion, chest pain (post op pain)  Pertinent Negatives: no abd pain, no fever  Timing: today  Radiation: none  Severity: mild  Aggravating Factors: none  Relieving Factors: none

## 2018-07-21 NOTE — DISCHARGE NOTE ADULT - CARE PROVIDER_API CALL
Uriah Manuel), Surgery; Thoracic and Cardiac Surgery  [address] [place] [address]  Phone: [phone]  Fax: [phone]    Ajit Koenig), Cardiac Electrophysiology; Cardiology; Internal Medicine  [address] [place] [id number]  Phone: [phone]  Fax: [phone]

## 2018-07-21 NOTE — ED PROVIDER NOTE - PHYSICAL EXAMINATION
Constitutional: Alert, NAD.   ENT: Airway patent. Nose clear. Mouth with normal mucosa.   Head: Atraumatic.   Eyes: Clear bilaterally. PERRL.   Cardiac: Normal rate. + reproducible chest wall tenderness   Respiratory: Breath sounds clear bilaterally.   GI: Abdomen soft, non-tender, no guarding.   : No CVA or bladder tenderness.   Musculoskeletal: FROM, no muscle or joint tenderness or swelling.   Neuro: alert and oriented, no focal deficits, no motor or sensory deficits. NIHSS = 0  Skin: Dry, intact, no rash. mild erythema and warmth around wound on left leg with staples in place  Psych: normal mood and affect.

## 2018-07-21 NOTE — H&P ADULT - NSHPSOCIALHISTORY_GEN_ALL_CORE
smoker up until admission on 7/6  resident of [place], currently visiting and staying with family  two story home  no use of cane or walker

## 2018-07-21 NOTE — H&P ADULT - PROBLEM SELECTOR PLAN 4
resolved on admission  CXR with mild congestion, stable b/l effusions  lasix 20IV x1  resume home nebulizers

## 2018-07-21 NOTE — H&P ADULT - NSHPPHYSICALEXAM_GEN_ALL_CORE
Neuro:  CV:  Pulm:  Abd:  Ext:  Inc: Neuro: alert, oriented to person, hospital, month july, states she forgets the year,   CV: S1 S2 RRR  Pulm: mild exp wheeze otherwise CTA  Abd: + BS soft NT ND  Ext: 1+ b/l LE edema, ecchymosis to L inner thigh, WWP, 2+ DP pulses b/l  Inc: mid sternal inc healing well, no click, mild tenderness to touch, LLE SVG harvest site intact, + staples, mild ecchymosis surrounding incision  CT sites x 2 healing no drainage or erythema

## 2018-07-21 NOTE — ED PROVIDER NOTE - MEDICAL DECISION MAKING DETAILS
Pt with leukocytosis and AMS, now resolved, likely secondary to infectious etiology.  Patient also with hyponatremia and anemia.  Will admit to CT surgery.

## 2018-07-21 NOTE — DISCHARGE NOTE ADULT - HOSPITAL COURSE
73F, pmhx HTN, CAD, NSTEMI, s/p C4L 7/11 with Dr. Manuel, uneventful postop course, d/c'd home 7/16/18, presented PM 7/20/18 with AMS and SOB per family, in ED no deficits, head CT neg for acute infarct, admitted to CT surgery for observation overnight; treated with IV lasix -> improved, discharged home same day with daily diuretics.

## 2018-07-21 NOTE — DISCHARGE NOTE ADULT - CARE PROVIDERS DIRECT ADDRESSES
[email or web address]

## 2018-07-21 NOTE — H&P ADULT - PMH
Coronary artery disease involving native coronary artery of native heart, angina presence unspecified    HTN (hypertension)    NSTEMI (non-ST elevated myocardial infarction)    S/P CABG x 4    Smoker  quit on 7/6 admission

## 2018-07-21 NOTE — DISCHARGE NOTE ADULT - MEDICATION SUMMARY - MEDICATIONS TO STOP TAKING
I will STOP taking the medications listed below when I get home from the hospital:  None I will STOP taking the medications listed below when I get home from the hospital:    oxyCODONE 5 mg oral tablet  -- 1 tab(s) by mouth every 4 hours, As needed, Moderate Pain (4 - 6) MDD:6

## 2018-07-21 NOTE — DISCHARGE NOTE ADULT - PLAN OF CARE
ease of breathing Continue current medications  Start daily water pill (furosemide)  Call the office if you experience shortness of breath, chest pain, swelling, confusion, fever or drainage of incisions. Follow up with Dr Manuel on 8/7 at 1:15pm at [facility].  Follow up with your cardiologist. **Please call the office for an appointment next week**  Follow up with your cardiologist.

## 2018-07-21 NOTE — H&P ADULT - NSHPLABSRESULTS_GEN_ALL_CORE
CT head 7/20/18:  No CT evidence of acute intracranial hemorrhage, mass effect or acute   territorial infarct.  Age indeterminant white matter infarct in the left corona radiata.  Follow-up MRI can be performed for further characterization.

## 2018-07-21 NOTE — H&P ADULT - ATTENDING COMMENTS
Above H& P reviewed and independently verified. S/P CABG, came to ER with mental status changes. Symptoms resolved. No acute CNS changes on imaging.   Admitted for observation.

## 2018-07-21 NOTE — ED PROVIDER NOTE - CARE PLAN
Principal Discharge DX:	Confusion  Secondary Diagnosis:	HTN (hypertension)  Secondary Diagnosis:	S/P CABG x 3

## 2018-07-21 NOTE — DISCHARGE NOTE ADULT - NS AS ACTIVITY OBS
No Heavy lifting/straining/Do not drive or operate machinery/Do not make important decisions/Showering allowed/Walking-Outdoors allowed/Stairs allowed

## 2018-07-21 NOTE — DISCHARGE NOTE ADULT - PATIENT PORTAL LINK FT
You can access the [facility] Patient Portal, offered by [facility], by registering with the following website: [email or web address]

## 2018-07-21 NOTE — ED PROVIDER NOTE - NS ED ROS FT
no fever  + post op chest pain  no SOB  no abd pain  no HA  + episode of confusion  All other ROS negative except as per HPI

## 2018-07-21 NOTE — H&P ADULT - ASSESSMENT
73F, pmhx HTN, CAD, NSTEMI, s/p C4L 7/11 with Dr. Manuel, uneventful postop course, d/c'd home 7/16, now presents with AMS and SOB per family, in ED no deficits, head CT neg for acute infarct, admitted to CT surgery for observation overnight;

## 2018-07-21 NOTE — DISCHARGE NOTE ADULT - CARE PLAN
Principal Discharge DX:	SOB (shortness of breath)  Goal:	ease of breathing  Assessment and plan of treatment:	Continue current medications  Start daily water pill (furosemide)  Call the office if you experience shortness of breath, chest pain, swelling, confusion, fever or drainage of incisions.  Secondary Diagnosis:	S/P CABG x 4  Assessment and plan of treatment:	Follow up with Dr Manuel on 8/7 at 1:15pm at [facility].  Follow up with your cardiologist. Principal Discharge DX:	SOB (shortness of breath)  Goal:	ease of breathing  Assessment and plan of treatment:	Continue current medications  Start daily water pill (furosemide)  Call the office if you experience shortness of breath, chest pain, swelling, confusion, fever or drainage of incisions.  Secondary Diagnosis:	S/P CABG x 4  Assessment and plan of treatment:	**Please call the office for an appointment next week**  Follow up with your cardiologist.

## 2018-07-21 NOTE — DISCHARGE NOTE ADULT - MEDICATION SUMMARY - MEDICATIONS TO TAKE
I will START or STAY ON the medications listed below when I get home from the hospital:    budesonide 90 mcg/inh inhalation powder  -- 2 puff(s) inhaled every 12 hours   -- For inhalation only.  Rinse mouth thoroughly after use.    -- Indication: For breathing ease    oxyCODONE 5 mg oral tablet  -- 1 tab(s) by mouth every 4 hours, As needed, Moderate Pain (4 - 6) MDD:6  -- Indication: For pain    aspirin 325 mg oral delayed release tablet  -- 1 tab(s) by mouth once a day  -- Indication: For Coronary artery disease involving native coronary artery of native heart, angina presence unspecified    atorvastatin 40 mg oral tablet  -- 1 tab(s) by mouth once a day (at bedtime)  -- Indication: For Hyperlipidemia    clopidogrel 75 mg oral tablet  -- 1 tab(s) by mouth once a day  -- Indication: For Coronary artery disease involving native coronary artery of native heart, angina presence unspecified    metoprolol tartrate 50 mg oral tablet  -- 1 tab(s) by mouth every 12 hours  -- Indication: For HTN (hypertension)    Combivent Respimat 20 mcg-100 mcg/inh inhalation aerosol  -- 1 puff(s) inhaled every 6 hours   -- Check with your doctor before becoming pregnant.  For inhalation only.  May cause drowsiness or dizziness.  Obtain medical advice before taking any non-prescription drugs as some may affect the action of this medication.  This drug may impair the ability to drive or operate machinery.  Use care until you become familiar with its effects.    -- Indication: For SOB (shortness of breath)    amLODIPine 10 mg oral tablet  -- 1 tab(s) by mouth once a day  -- Indication: For HTN (hypertension)    FeroSul 325 mg (65 mg elemental iron) oral tablet  -- 1 tab(s) by mouth 3 times a day   -- Check with your doctor before becoming pregnant.  Do not chew, break, or crush.  May discolor urine or feces.    -- Indication: For Anemia    pantoprazole 40 mg oral delayed release tablet  -- 1 tab(s) by mouth once a day  -- Indication: For Acid reflux    Multiple Vitamins oral tablet  -- 1 tab(s) by mouth once a day  -- Indication: For vitamin    thiamine 100 mg oral tablet  -- 1 tab(s) by mouth once a day  -- Indication: For vitamin    folic acid 1 mg oral tablet  -- 1 tab(s) by mouth once a day  -- Indication: For Anemia I will START or STAY ON the medications listed below when I get home from the hospital:    budesonide 90 mcg/inh inhalation powder  -- 2 puff(s) inhaled every 12 hours   -- For inhalation only.  Rinse mouth thoroughly after use.    -- Indication: For breathing ease    Aldactone 25 mg oral tablet  -- 1 tab(s) by mouth once a day   -- It is very important that you take or use this exactly as directed.  Do not skip doses or discontinue unless directed by your doctor.  May cause drowsiness or dizziness.    -- Indication: For fluid overload    Tylenol 325 mg oral tablet  -- 2 tab(s) by mouth every 6 hours, As Needed for pain  -- Indication: For pain    aspirin 325 mg oral delayed release tablet  -- 1 tab(s) by mouth once a day  -- Indication: For Coronary artery disease involving native coronary artery of native heart, angina presence unspecified    atorvastatin 40 mg oral tablet  -- 1 tab(s) by mouth once a day (at bedtime)  -- Indication: For Hyperlipidemia    clopidogrel 75 mg oral tablet  -- 1 tab(s) by mouth once a day  -- Indication: For Coronary artery disease involving native coronary artery of native heart, angina presence unspecified    metoprolol tartrate 50 mg oral tablet  -- 1 tab(s) by mouth every 12 hours  -- Indication: For Hypertension    Combivent Respimat 20 mcg-100 mcg/inh inhalation aerosol  -- 1 puff(s) inhaled every 6 hours   -- Check with your doctor before becoming pregnant.  For inhalation only.  May cause drowsiness or dizziness.  Obtain medical advice before taking any non-prescription drugs as some may affect the action of this medication.  This drug may impair the ability to drive or operate machinery.  Use care until you become familiar with its effects.    -- Indication: For breathing    amLODIPine 10 mg oral tablet  -- 1 tab(s) by mouth once a day  -- Indication: For Hypertension    Lasix 40 mg oral tablet  -- 1 tab(s) by mouth once a day   -- Avoid prolonged or excessive exposure to direct and/or artificial sunlight while taking this medication.  It is very important that you take or use this exactly as directed.  Do not skip doses or discontinue unless directed by your doctor.  It may be advisable to drink a full glass orange juice or eat a banana daily while taking this medication.    -- Indication: For fluid overload    FeroSul 325 mg (65 mg elemental iron) oral tablet  -- 1 tab(s) by mouth 3 times a day   -- Check with your doctor before becoming pregnant.  Do not chew, break, or crush.  May discolor urine or feces.    -- Indication: For Anemia    pantoprazole 40 mg oral delayed release tablet  -- 1 tab(s) by mouth once a day  -- Indication: For Acid reflux    Multiple Vitamins oral tablet  -- 1 tab(s) by mouth once a day  -- Indication: For vitamin    thiamine 100 mg oral tablet  -- 1 tab(s) by mouth once a day  -- Indication: For vitamin    folic acid 1 mg oral tablet  -- 1 tab(s) by mouth once a day  -- Indication: For vitamin I will START or STAY ON the medications listed below when I get home from the hospital:    budesonide 90 mcg/inh inhalation powder  -- 2 puff(s) inhaled every 12 hours   -- For inhalation only.  Rinse mouth thoroughly after use.    -- Indication: For breathing ease    Aldactone 25 mg oral tablet  -- 1 tab(s) by mouth once a day   -- It is very important that you take or use this exactly as directed.  Do not skip doses or discontinue unless directed by your doctor.  May cause drowsiness or dizziness.    -- Indication: For fluid overload    Tylenol 325 mg oral tablet  -- 2 tab(s) by mouth every 6 hours, As Needed for pain  -- Indication: For pain    aspirin 325 mg oral delayed release tablet  -- 1 tab(s) by mouth once a day  -- Indication: For Coronary artery disease involving native coronary artery of native heart, angina presence unspecified    atorvastatin 40 mg oral tablet  -- 1 tab(s) by mouth once a day (at bedtime)  -- Indication: For Hyperlipidemia    clopidogrel 75 mg oral tablet  -- 1 tab(s) by mouth once a day  -- Indication: For Coronary artery disease involving native coronary artery of native heart, angina presence unspecified    metoprolol tartrate 50 mg oral tablet  -- 1 tab(s) by mouth every 12 hours  -- Indication: For Hypertension    Combivent Respimat 20 mcg-100 mcg/inh inhalation aerosol  -- 1 puff(s) inhaled every 6 hours   -- Check with your doctor before becoming pregnant.  For inhalation only.  May cause drowsiness or dizziness.  Obtain medical advice before taking any non-prescription drugs as some may affect the action of this medication.  This drug may impair the ability to drive or operate machinery.  Use care until you become familiar with its effects.    -- Indication: For breathing    amLODIPine 10 mg oral tablet  -- 1 tab(s) by mouth once a day  -- Indication: For Hypertension    Lasix 40 mg oral tablet  -- 1 tab(s) by mouth once a day   -- Avoid prolonged or excessive exposure to direct and/or artificial sunlight while taking this medication.  It is very important that you take or use this exactly as directed.  Do not skip doses or discontinue unless directed by your doctor.  It may be advisable to drink a full glass orange juice or eat a banana daily while taking this medication.    -- Indication: For fluid overload    FeroSul 325 mg (65 mg elemental iron) oral tablet  -- 1 tab(s) by mouth 3 times a day   -- Check with your doctor before becoming pregnant.  Do not chew, break, or crush.  May discolor urine or feces.    -- Indication: For iron    pantoprazole 40 mg oral delayed release tablet  -- 1 tab(s) by mouth once a day  -- Indication: For Acid reflux    Multiple Vitamins oral tablet  -- 1 tab(s) by mouth once a day  -- Indication: For vitamin    thiamine 100 mg oral tablet  -- 1 tab(s) by mouth once a day  -- Indication: For vitamin    folic acid 1 mg oral tablet  -- 1 tab(s) by mouth once a day  -- Indication: For vitamin

## 2018-07-21 NOTE — H&P ADULT - PROBLEM SELECTOR PLAN 1
resolved by time of presentation to ED  no focal deficits  head CT neg for acute infarct  d/w attending in AM if neuro consult needed for age indeterminate infarct on CT   hold narcotics  serial neuro exams  admit to CICU Dr. Messer for observation

## 2018-07-21 NOTE — H&P ADULT - HISTORY OF PRESENT ILLNESS
73F, pmhx smoker, HTN, CAD, s/p C4L 7/11 after being admitted with CP and ruling in for NSTEMI, uneventful postop course, d/c'd home on 7/16, presents to ED after family concerned for AMS and SOB.  Granddaughter reports approximately 5pm pt was noted to be sad and crying for no specific reason when asked.  They called the clinical call center and were told this can be "normal" after open heart surgery. Then around 6pm the granddaughter noted that the pt was c/o incisional pain and took one percocet.  She was walking around the house and became SOB. Per the family, pt did not complain of SOB but appeared labored so they gave her a breathing treatment.  Then the pt was noted to be confused.  Family reports she was not answering their questions appropriately and was talking about stories they had never heard before. Pt then became agitated and was telling family to leave her alone and let her go to sleep.  Granddaughter again called clinical call center and was advised to come to ED for further evaluation.  In ED, initially code stroke called for confusion and report of slurred speech, but was cancelled after learning the pt never had slurred speech.  Head CT with no acute infarct. Sepsis work up initiated by ED as well.  Pt appears intact by time of arrival to ED and only reports having a mild HA which just started on arrival. 73F, pmhx smoker, HTN, CAD, s/p C4L 7/11 after being admitted with CP and ruling in for NSTEMI, uneventful postop course, d/c'd home on 7/16, presents to ED after family concerned for AMS and SOB.  Granddaughter reports approximately 5pm pt was noted to be sad and crying for no specific reason when asked.  They called the clinical call center and were told this can be "normal" after open heart surgery. Then around 6pm the granddaughter noted that the pt was c/o incisional pain and took one percocet.  She was walking around the house and became SOB. Per the family, pt did not complain of SOB but appeared labored so they gave her a breathing treatment.  Then the pt was noted to be confused.  Family reports she was not answering their questions appropriately and was talking about stories they had never heard before. Pt then became agitated and was telling family to leave her alone and let her go to sleep.  Granddaughter again called clinical call center and was advised to come to ED for further evaluation.  In ED, initially code stroke called for confusion and report of slurred speech, but was cancelled after learning the pt never had slurred speech.  Head CT with no acute infarct. Sepsis work up initiated by ED as well.  Pt appears intact by time of arrival to ED and only reports having a mild HA which just started on arrival.   When pt asked, she reports feeling "tired" walking up the stairs but her family kept telling her she was weak and that she needed to come back to the hospital. Pt states she felt fine and that she did not need to come.  Pt denies CP, palpitations, cough, fever, chills, dizziness, N/V, abd pain, diarrhea or constipation. 73F, pmhx smoker, HTN, CAD, s/p C4L 7/11 after being admitted with CP and ruling in for NSTEMI, uneventful postop course, d/c'd home on 7/16, presents to ED after family concerned for AMS and SOB.  Granddaughter reports approximately 5pm pt was noted to be sad and crying for no specific reason when asked.  They called the clinical call center and were told this can be "normal" after open heart surgery. Then around 6pm the granddaughter noted that the pt was c/o incisional pain and took one percocet.  She was walking around the house and became SOB. Per the family, pt did not complain of SOB but appeared labored so they gave her a breathing treatment.  Then the pt was noted to be confused.  Family reports she was not answering their questions appropriately and was talking about stories they had never heard before. Family also reports, pt was so confused, she didnt recognize her own family members. Pt then became agitated and was telling family to leave her alone and let her go to sleep.  Granddaughter again called clinical call center and was advised to come to ED for further evaluation.  In ED, initially code stroke called for confusion and report of slurred speech, but was cancelled after learning the pt never had slurred speech.  Head CT with no acute infarct. Sepsis work up initiated by ED as well.  Pt appears intact by time of arrival to ED and only reports having a mild HA which just started on arrival.   When pt asked, she reports feeling "tired" walking up the stairs but her family kept telling her she was weak and that she needed to come back to the hospital. Pt states she felt fine and that she did not need to come.  Pt denies CP, palpitations, cough, fever, chills, dizziness, N/V, abd pain, diarrhea or constipation.

## 2018-07-23 LAB
-  AMIKACIN: SIGNIFICANT CHANGE UP
-  AMIKACIN: SIGNIFICANT CHANGE UP
-  AMPICILLIN/SULBACTAM: SIGNIFICANT CHANGE UP
-  AMPICILLIN/SULBACTAM: SIGNIFICANT CHANGE UP
-  AMPICILLIN: SIGNIFICANT CHANGE UP
-  AMPICILLIN: SIGNIFICANT CHANGE UP
-  AZTREONAM: SIGNIFICANT CHANGE UP
-  AZTREONAM: SIGNIFICANT CHANGE UP
-  CEFAZOLIN: SIGNIFICANT CHANGE UP
-  CEFAZOLIN: SIGNIFICANT CHANGE UP
-  CEFEPIME: SIGNIFICANT CHANGE UP
-  CEFEPIME: SIGNIFICANT CHANGE UP
-  CEFOTAXIME: SIGNIFICANT CHANGE UP
-  CEFOXITIN: SIGNIFICANT CHANGE UP
-  CEFOXITIN: SIGNIFICANT CHANGE UP
-  CEFTAZIDIME: SIGNIFICANT CHANGE UP
-  CEFTRIAXONE: SIGNIFICANT CHANGE UP
-  CEFTRIAXONE: SIGNIFICANT CHANGE UP
-  CEFUROXIME: SIGNIFICANT CHANGE UP
-  CIPROFLOXACIN: SIGNIFICANT CHANGE UP
-  CIPROFLOXACIN: SIGNIFICANT CHANGE UP
-  ERTAPENEM: SIGNIFICANT CHANGE UP
-  ERTAPENEM: SIGNIFICANT CHANGE UP
-  GENTAMICIN: SIGNIFICANT CHANGE UP
-  GENTAMICIN: SIGNIFICANT CHANGE UP
-  IMIPENEM: SIGNIFICANT CHANGE UP
-  IMIPENEM: SIGNIFICANT CHANGE UP
-  LEVOFLOXACIN: SIGNIFICANT CHANGE UP
-  LEVOFLOXACIN: SIGNIFICANT CHANGE UP
-  MEROPENEM: SIGNIFICANT CHANGE UP
-  MEROPENEM: SIGNIFICANT CHANGE UP
-  NITROFURANTOIN: SIGNIFICANT CHANGE UP
-  NITROFURANTOIN: SIGNIFICANT CHANGE UP
-  PIPERACILLIN/TAZOBACTAM: SIGNIFICANT CHANGE UP
-  PIPERACILLIN/TAZOBACTAM: SIGNIFICANT CHANGE UP
-  TIGECYCLINE: SIGNIFICANT CHANGE UP
-  TIGECYCLINE: SIGNIFICANT CHANGE UP
-  TOBRAMYCIN: SIGNIFICANT CHANGE UP
-  TOBRAMYCIN: SIGNIFICANT CHANGE UP
-  TRIMETHOPRIM/SULFAMETHOXAZOLE: SIGNIFICANT CHANGE UP
-  TRIMETHOPRIM/SULFAMETHOXAZOLE: SIGNIFICANT CHANGE UP
CULTURE RESULTS: SIGNIFICANT CHANGE UP
METHOD TYPE: SIGNIFICANT CHANGE UP
METHOD TYPE: SIGNIFICANT CHANGE UP
ORGANISM # SPEC MICROSCOPIC CNT: SIGNIFICANT CHANGE UP
SPECIMEN SOURCE: SIGNIFICANT CHANGE UP

## 2018-07-23 RX ORDER — FUROSEMIDE 40 MG/1
40 TABLET ORAL
Refills: 0 | Status: ACTIVE | COMMUNITY

## 2018-07-23 RX ORDER — FOLIC ACID 1 MG/1
1 TABLET ORAL DAILY
Refills: 0 | Status: ACTIVE | COMMUNITY
Start: 2018-07-23

## 2018-07-23 RX ORDER — SPIRONOLACTONE 25 MG/1
25 TABLET ORAL
Refills: 0 | Status: ACTIVE | COMMUNITY

## 2018-07-23 RX ORDER — FERROUS SULFATE TAB EC 324 MG (65 MG FE EQUIVALENT) 324 (65 FE) MG
324 (65 FE) TABLET DELAYED RESPONSE ORAL
Refills: 0 | Status: ACTIVE | COMMUNITY
Start: 2018-07-23

## 2018-07-24 ENCOUNTER — APPOINTMENT (OUTPATIENT)
Dept: CARDIOTHORACIC SURGERY | Facility: CLINIC | Age: 73
End: 2018-07-24

## 2018-07-24 VITALS
DIASTOLIC BLOOD PRESSURE: 75 MMHG | RESPIRATION RATE: 16 BRPM | HEART RATE: 64 BPM | OXYGEN SATURATION: 99 % | BODY MASS INDEX: 26.68 KG/M2 | HEIGHT: 62 IN | WEIGHT: 145 LBS | SYSTOLIC BLOOD PRESSURE: 119 MMHG

## 2018-07-26 LAB
CULTURE RESULTS: SIGNIFICANT CHANGE UP
CULTURE RESULTS: SIGNIFICANT CHANGE UP
SPECIMEN SOURCE: SIGNIFICANT CHANGE UP
SPECIMEN SOURCE: SIGNIFICANT CHANGE UP

## 2018-08-11 ENCOUNTER — EMERGENCY (EMERGENCY)
Facility: HOSPITAL | Age: 73
LOS: 1 days | Discharge: HOME | End: 2018-08-11
Attending: EMERGENCY MEDICINE
Payer: MEDICAID

## 2018-08-11 VITALS
OXYGEN SATURATION: 98 % | SYSTOLIC BLOOD PRESSURE: 105 MMHG | TEMPERATURE: 98 F | HEIGHT: 60 IN | HEART RATE: 30 BPM | DIASTOLIC BLOOD PRESSURE: 64 MMHG | WEIGHT: 130.07 LBS | RESPIRATION RATE: 20 BRPM

## 2018-08-11 VITALS
OXYGEN SATURATION: 95 % | RESPIRATION RATE: 18 BRPM | SYSTOLIC BLOOD PRESSURE: 116 MMHG | TEMPERATURE: 98 F | HEART RATE: 75 BPM | DIASTOLIC BLOOD PRESSURE: 92 MMHG

## 2018-08-11 DIAGNOSIS — Z95.1 PRESENCE OF AORTOCORONARY BYPASS GRAFT: Chronic | ICD-10-CM

## 2018-08-11 DIAGNOSIS — I97.89 OTHER POSTPROCEDURAL COMPLICATIONS AND DISORDERS OF THE CIRCULATORY SYSTEM, NOT ELSEWHERE CLASSIFIED: ICD-10-CM

## 2018-08-11 PROBLEM — I21.4 NON-ST ELEVATION (NSTEMI) MYOCARDIAL INFARCTION: Chronic | Status: ACTIVE | Noted: 2018-07-21

## 2018-08-11 PROBLEM — F17.200 NICOTINE DEPENDENCE, UNSPECIFIED, UNCOMPLICATED: Chronic | Status: ACTIVE | Noted: 2018-07-21

## 2018-08-11 PROBLEM — I25.10 ATHEROSCLEROTIC HEART DISEASE OF NATIVE CORONARY ARTERY WITHOUT ANGINA PECTORIS: Chronic | Status: ACTIVE | Noted: 2018-07-21

## 2018-08-11 LAB
ALBUMIN SERPL ELPH-MCNC: 4.2 G/DL — SIGNIFICANT CHANGE UP (ref 3.3–5.2)
ALP SERPL-CCNC: 103 U/L — SIGNIFICANT CHANGE UP (ref 40–120)
ALT FLD-CCNC: 12 U/L — SIGNIFICANT CHANGE UP
ANION GAP SERPL CALC-SCNC: 17 MMOL/L — SIGNIFICANT CHANGE UP (ref 5–17)
APTT BLD: 33.5 SEC — SIGNIFICANT CHANGE UP (ref 27.5–37.4)
AST SERPL-CCNC: 13 U/L — SIGNIFICANT CHANGE UP
BASE EXCESS BLDA CALC-SCNC: 1.5 MMOL/L — SIGNIFICANT CHANGE UP (ref -2–2)
BILIRUB SERPL-MCNC: 0.6 MG/DL — SIGNIFICANT CHANGE UP (ref 0.4–2)
BUN SERPL-MCNC: 19 MG/DL — SIGNIFICANT CHANGE UP (ref 8–20)
CALCIUM SERPL-MCNC: 9.7 MG/DL — SIGNIFICANT CHANGE UP (ref 8.6–10.2)
CHLORIDE SERPL-SCNC: 95 MMOL/L — LOW (ref 98–107)
CO2 SERPL-SCNC: 24 MMOL/L — SIGNIFICANT CHANGE UP (ref 22–29)
CREAT SERPL-MCNC: 0.77 MG/DL — SIGNIFICANT CHANGE UP (ref 0.5–1.3)
GAS PNL BLDA: SIGNIFICANT CHANGE UP
GLUCOSE SERPL-MCNC: 95 MG/DL — SIGNIFICANT CHANGE UP (ref 70–115)
HCO3 BLDA-SCNC: 26 MMOL/L — SIGNIFICANT CHANGE UP (ref 20–26)
HCT VFR BLD CALC: 33.5 % — LOW (ref 37–47)
HGB BLD-MCNC: 10.8 G/DL — LOW (ref 12–16)
HOROWITZ INDEX BLDA+IHG-RTO: SIGNIFICANT CHANGE UP
INR BLD: 1.04 RATIO — SIGNIFICANT CHANGE UP (ref 0.88–1.16)
LACTATE BLDV-MCNC: 2 MMOL/L — SIGNIFICANT CHANGE UP (ref 0.5–2)
MCHC RBC-ENTMCNC: 29.7 PG — SIGNIFICANT CHANGE UP (ref 27–31)
MCHC RBC-ENTMCNC: 32.2 G/DL — SIGNIFICANT CHANGE UP (ref 32–36)
MCV RBC AUTO: 92 FL — SIGNIFICANT CHANGE UP (ref 81–99)
NT-PROBNP SERPL-SCNC: 1327 PG/ML — HIGH (ref 0–300)
PCO2 BLDA: 36 MMHG — SIGNIFICANT CHANGE UP (ref 35–45)
PH BLDA: 7.46 — HIGH (ref 7.35–7.45)
PLATELET # BLD AUTO: 311 K/UL — SIGNIFICANT CHANGE UP (ref 150–400)
PO2 BLDA: 67 MMHG — LOW (ref 83–108)
POTASSIUM SERPL-MCNC: 4.6 MMOL/L — SIGNIFICANT CHANGE UP (ref 3.5–5.3)
POTASSIUM SERPL-SCNC: 4.6 MMOL/L — SIGNIFICANT CHANGE UP (ref 3.5–5.3)
PROT SERPL-MCNC: 7.5 G/DL — SIGNIFICANT CHANGE UP (ref 6.6–8.7)
PROTHROM AB SERPL-ACNC: 11.4 SEC — SIGNIFICANT CHANGE UP (ref 9.8–12.7)
RBC # BLD: 3.64 M/UL — LOW (ref 4.4–5.2)
RBC # FLD: 14.2 % — SIGNIFICANT CHANGE UP (ref 11–15.6)
SAO2 % BLDA: 94 % — LOW (ref 95–99)
SODIUM SERPL-SCNC: 136 MMOL/L — SIGNIFICANT CHANGE UP (ref 135–145)
WBC # BLD: 7.9 K/UL — SIGNIFICANT CHANGE UP (ref 4.8–10.8)
WBC # FLD AUTO: 7.9 K/UL — SIGNIFICANT CHANGE UP (ref 4.8–10.8)

## 2018-08-11 PROCEDURE — 99291 CRITICAL CARE FIRST HOUR: CPT

## 2018-08-11 PROCEDURE — 93306 TTE W/DOPPLER COMPLETE: CPT

## 2018-08-11 PROCEDURE — 99292 CRITICAL CARE ADDL 30 MIN: CPT

## 2018-08-11 PROCEDURE — 71045 X-RAY EXAM CHEST 1 VIEW: CPT

## 2018-08-11 PROCEDURE — 99285 EMERGENCY DEPT VISIT HI MDM: CPT | Mod: 25

## 2018-08-11 PROCEDURE — 83880 ASSAY OF NATRIURETIC PEPTIDE: CPT

## 2018-08-11 PROCEDURE — 71045 X-RAY EXAM CHEST 1 VIEW: CPT | Mod: 26

## 2018-08-11 PROCEDURE — 96374 THER/PROPH/DIAG INJ IV PUSH: CPT

## 2018-08-11 PROCEDURE — 36600 WITHDRAWAL OF ARTERIAL BLOOD: CPT

## 2018-08-11 PROCEDURE — 85610 PROTHROMBIN TIME: CPT

## 2018-08-11 PROCEDURE — 82803 BLOOD GASES ANY COMBINATION: CPT

## 2018-08-11 PROCEDURE — 85027 COMPLETE CBC AUTOMATED: CPT

## 2018-08-11 PROCEDURE — 85730 THROMBOPLASTIN TIME PARTIAL: CPT

## 2018-08-11 PROCEDURE — 87040 BLOOD CULTURE FOR BACTERIA: CPT

## 2018-08-11 PROCEDURE — 80053 COMPREHEN METABOLIC PANEL: CPT

## 2018-08-11 PROCEDURE — 36415 COLL VENOUS BLD VENIPUNCTURE: CPT

## 2018-08-11 PROCEDURE — 83605 ASSAY OF LACTIC ACID: CPT

## 2018-08-11 PROCEDURE — 99284 EMERGENCY DEPT VISIT MOD MDM: CPT

## 2018-08-11 RX ORDER — LEVOFLOXACIN 5 MG/ML
1 INJECTION, SOLUTION INTRAVENOUS
Qty: 14 | Refills: 0
Start: 2018-08-11 | End: 2018-08-24

## 2018-08-11 RX ORDER — METOPROLOL TARTRATE 50 MG
0.5 TABLET ORAL
Qty: 30 | Refills: 0
Start: 2018-08-11 | End: 2018-09-09

## 2018-08-11 RX ORDER — SACCHAROMYCES BOULARDII 250 MG
250 POWDER IN PACKET (EA) ORAL ONCE
Qty: 0 | Refills: 0 | Status: DISCONTINUED | OUTPATIENT
Start: 2018-08-11 | End: 2018-08-16

## 2018-08-11 RX ORDER — FUROSEMIDE 40 MG
20 TABLET ORAL ONCE
Qty: 0 | Refills: 0 | Status: COMPLETED | OUTPATIENT
Start: 2018-08-11 | End: 2018-08-11

## 2018-08-11 RX ORDER — L.ACIDOPH/B.ANIMALIS/B.LONGUM 15B CELL
1 CAPSULE ORAL
Qty: 14 | Refills: 0
Start: 2018-08-11 | End: 2018-08-24

## 2018-08-11 RX ADMIN — Medication 20 MILLIGRAM(S): at 15:17

## 2018-08-11 NOTE — CONSULT NOTE ADULT - SUBJECTIVE AND OBJECTIVE BOX
[facility]                                                        Office: [address] [address]                                                       Telephone: [phone]. Fax:[phone]    Patient is a 73y old  Female who presents with a chief complaint of shortness of breath    HPI:      PAST MEDICAL & SURGICAL HISTORY:  Smoker: quit on 7/6 admission  S/P CABG x 4  NSTEMI (non-ST elevated myocardial infarction)  Coronary artery disease involving native coronary artery of native heart, angina presence unspecified  HTN (hypertension)  S/P CABG x 4      Allergies    latex (Unknown)  No Known Drug Allergies    Intolerances    [facility] PT (Unknown)      Home Medications:  Tylenol 325 mg oral tablet: 2 tab(s) orally every 6 hours, As Needed for pain (21 Jul 2018 11:03)      MEDICATIONS  (STANDING):    MEDICATIONS  (PRN):      FAMILY HISTORY:  No pertinent family history in first degree relatives      SOCIAL HISTORY: No tobacco/ No etoh/ No illicit drug use    PREVIOUS DIAGNOSTIC TESTING:      ECHO FINDINGS:  STRESS FINDINGS:  CATHETERIZATION FINDINGS:    REVIEW OF SYSTEMS:  CONSTITUTIONAL: [-]fever   [-] weight loss   [-] fatigue  EYES: [-]  eye pain   [-] visual disturbances      [-]  discharge  ENMT:  [-]  difficulty hearing,   [-]  tinnitus   [-] vertigo    [-]  sinus or throat pain  NECK: [-]  pain or stiffness  RESPIRATORY: [-]  cough 	[-] wheezing 	[-]  hemoptysis 		[-]   Shortness of Breath  CARDIOVASCULAR: [-]  chest pain	[-] palpitations		[-]  passing out 		[-] dizziness 	[-]  leg swelling  		[-]  PND 	[-] orthopnea  GASTROINTESTINAL: [-]  abdominal pain		[-]nausea	[-] vomiting	[-]  hematemesis 	[-]  diarrhea  	[-] constipation 		[-]  melena 	[-] hematochezia.  GENITOURINARY: [-] dysuria	[-] frequency	[-] hematuria	[-]  incontinence  NEUROLOGICAL: [-]  headaches		[-] memory loss 	[-]  loss of strength  			[-]  numbness/tingling 	[-]  tremors  SKIN: [-]  itching 	[-] rashes 	[-]  lesions   LYMPH Nodes: [-] enlarged glands  ENDOCRINE: [-] heat or cold intolerance 	[-]   hair loss  MUSCULOSKELETAL: [-] joint pain  [-] joint swelling	[-]  muscle, back, or extremity pain  PSYCHIATRIC: [-]  depression	[-] anxiety	[-] mood swings		[-]  difficulty sleeping   HEME: [-]  easy bruising 	[-]  bleeding   ALLERY AND IMMUNOLOGIC: [-]  hives or eczema	      Vital Signs Last 24 Hrs  T(C): 36.5 (11 Aug 2018 10:37), Max: 36.6 (11 Aug 2018 10:31)  T(F): 97.7 (11 Aug 2018 10:37), Max: 97.8 (11 Aug 2018 10:31)  HR: 65 (11 Aug 2018 13:03) (30 - 65)  BP: 114/56 (11 Aug 2018 10:37) (105/64 - 114/56)  BP(mean): --  RR: 18 (11 Aug 2018 10:37) (18 - 20)  SpO2: 98% (11 Aug 2018 10:31) (98% - 98%)  Daily Height in cm: 152.4 (11 Aug 2018 10:31)    Daily   I&O's Detail        PHYSICAL EXAM:  Appearance: Normal, well nourished, NAD	  HEENT:   Normal oral mucosa, PERRL, EOMI, sclera non-icteric	  Lymphatic: No cervical lymphadenopathy  Cardiovascular: Normal S1 S2, No JVD, No cardiac murmurs, No carotid bruits, No peripheral edema  Respiratory: Lungs clear to auscultation	  Psychiatry: A & O x 3, Mood & affect appropriate  Gastrointestinal:  Soft, Non-tender, + BS, no bruits	  Skin: No rashes, No ecchymoses, No cyanosis, Dry  Neurologic: Grossly non-focal with full strength in all four extremities  Extremities: Normal range of motion, No clubbing, cyanosis or edema  Vascular: Peripheral pulses palpable 2+ bilaterally, warm    INTERPRETATION OF TELEMETRY:    ECG (tracing reviewed by me):    LABS:                        10.8   7.9   )-----------( 311      ( 11 Aug 2018 10:48 )             33.5     08-11    136  |  95<L>  |  19.0  ----------------------------<  95  4.6   |  24.0  |  0.77    Ca    9.7      11 Aug 2018 10:48    TPro  7.5  /  Alb  4.2  /  TBili  0.6  /  DBili  x   /  AST  13  /  ALT  12  /  AlkPhos  103  08-11        PT/INR - ( 11 Aug 2018 10:48 )   PT: 11.4 sec;   INR: 1.04 ratio         PTT - ( 11 Aug 2018 10:48 )  PTT:33.5 sec    BNPSerum Pro-Brain Natriuretic Peptide: 1327 pg/mL (08-11 @ 10:48)      RADIOLOGY & ADDITIONAL STUDIES:  CXR (image reviewed by me): [facility]                                                        Office: [address] [place] [address]                                                       Telephone: [phone]. Fax:[phone]    Patient is a 73y old  Female who presents with a chief complaint of shortness of breath    HPI: 74 y/o female from [place], smoker, with hypertension, multivessel CAD s/p 4 vessel CABG presents with abdominal pain/nausea yesterday, developed acute onset dizziness and shortness of breath overnight, unable to sleep.  Sternal chest pain.  No bleeding, but does report black stools (also on iron supplements). On arrival, HR 30s, per ED staff "asymptomatic", mentating well, hemodynamically stable.  Prior to discharge last admission for CABG, was started on metoprolol and uptitrated to 50 mg bid prior to discharge.  No syncope.      PAST MEDICAL & SURGICAL HISTORY:  Smoker: quit on 7/6 admission  S/P CABG x 4  NSTEMI (non-ST elevated myocardial infarction)  Coronary artery disease involving native coronary artery of native heart, angina presence unspecified  HTN (hypertension)  S/P CABG x 4      Allergies  latex (Unknown)  No Known Drug Allergies  Intolerances    [facility] PT (Unknown)      Home Medications:  amlodipine 10  anoro  feso4  asa 325  folic acid  atorvastatin  protonix  metoprolol 50 mg bid  aldactone 25  plavix 75  lasix 40      MEDICATIONS  (STANDING):    MEDICATIONS  (PRN):      FAMILY HISTORY:  No pertinent family history in first degree relatives      SOCIAL HISTORY: former tobacco/ No etoh/ No illicit drug use    PREVIOUS DIAGNOSTIC TESTING:      ECHO FINDINGS: < from: TTE Echo Complete w/Doppler (07.07.18 @ 13:32) >  Summary:   1. Technically difficult study.   2. There is mild concentric left ventricular hypertrophy.  3. Hyperdynamic global left ventricular systolic function.   4. Left ventricular ejection fraction, by visual estimation, is 70 to   75%.   5. Spectral Doppler shows impaired relaxation pattern of left   ventricular myocardial filling (Grade I diastolic dysfunction).   6. Normal right ventricular size and function.   7. Mildly enlarged left atrium.   8. The right atrium is normal in size.   9. Sclerotic aortic valve with normal opening.  10. Normal pulmonary artery pressure.  11. Moderate sized plaque involving the ascending aorta.  12. There is no evidence of pericardial effusion.    [id number] Elvin Lozoya MD, Electronically signed on 7/7/2018 at 4:27:52 PM       < end of copied text >    STRESS FINDINGS:   CATHETERIZATION FINDINGS: < from: Cardiac Cath Lab - Adult (07.06.18 @ 17:05) >  DIAGNOSTIC IMPRESSIONS: There is significant triple vessel coronary artery  disease.  Mid LAD=90%  Mid LCx=70%  Prox RCA=95%  Mid ODC=948% (Fills via collaterals from the Left system) Left ventricular  function is normal.  LVEF=70%  DIAGNOSTIC RECOMMENDATIONS: The patient should continue with the present  medications. A consultation with CT Surgery be obtained.  Prepared and signed by  Pedro Perez MD    < end of copied text >      REVIEW OF SYSTEMS:  CONSTITUTIONAL: [-]fever   [-] weight loss   [-] fatigue  EYES: [-]  eye pain   [-] visual disturbances      [-]  discharge  ENMT:  [-]  difficulty hearing,   [-]  tinnitus   [-] vertigo    [-]  sinus or throat pain  NECK: [-]  pain or stiffness  RESPIRATORY: [-]  cough 	[-] wheezing 	[-]  hemoptysis 		[-]   Shortness of Breath  CARDIOVASCULAR: [-]  chest pain	[-] palpitations		[-]  passing out 		[-] dizziness 	[-]  leg swelling  		[-]  PND 	[-] orthopnea  GASTROINTESTINAL: [-]  abdominal pain		[-]nausea	[-] vomiting	[-]  hematemesis 	[-]  diarrhea  	[-] constipation 		[-]  melena 	[-] hematochezia.  GENITOURINARY: [-] dysuria	[-] frequency	[-] hematuria	[-]  incontinence  NEUROLOGICAL: [-]  headaches		[-] memory loss 	[-]  loss of strength  			[-]  numbness/tingling 	[-]  tremors  SKIN: [-]  itching 	[-] rashes 	[-]  lesions   LYMPH Nodes: [-] enlarged glands  ENDOCRINE: [-] heat or cold intolerance 	[-]   hair loss  MUSCULOSKELETAL: [-] joint pain  [-] joint swelling	[-]  muscle, back, or extremity pain  PSYCHIATRIC: [-]  depression	[-] anxiety	[-] mood swings		[-]  difficulty sleeping   HEME: [-]  easy bruising 	[-]  bleeding   ALLERY AND IMMUNOLOGIC: [-]  hives or eczema	      Vital Signs Last 24 Hrs  T(C): 36.5 (11 Aug 2018 10:37), Max: 36.6 (11 Aug 2018 10:31)  T(F): 97.7 (11 Aug 2018 10:37), Max: 97.8 (11 Aug 2018 10:31)  HR: 65 (11 Aug 2018 13:03) (30 - 65)  BP: 114/56 (11 Aug 2018 10:37) (105/64 - 114/56)  BP(mean): --  RR: 18 (11 Aug 2018 10:37) (18 - 20)  SpO2: 98% (11 Aug 2018 10:31) (98% - 98%)  Daily Height in cm: 152.4 (11 Aug 2018 10:31)    Daily   I&O's Detail        PHYSICAL EXAM:  Appearance: Normal, well nourished, NAD	  HEENT:   Normal oral mucosa, PERRL, EOMI, sclera non-icteric	  Lymphatic: No cervical lymphadenopathy  Cardiovascular: Normal S1 S2, No JVD, No cardiac murmurs, No carotid bruits, No peripheral edema  Respiratory: Lungs clear to auscultation	  Psychiatry: A & O x 3, Mood & affect appropriate  Gastrointestinal:  Soft, Non-tender, + BS, no bruits	  Skin: No rashes, No ecchymoses, No cyanosis, Dry; sternotomy clean, no discharge  Neurologic: Grossly non-focal with full strength in all four extremities  Extremities: Normal range of motion, No clubbing, cyanosis or edema  Vascular: Peripheral pulses palpable 2+ bilaterally, warm    INTERPRETATION OF TELEMETRY: SR 60s    ECG (tracing reviewed by me): unable to locate    LABS:                        10.8   7.9   )-----------( 311      ( 11 Aug 2018 10:48 )             33.5     08-11    136  |  95<L>  |  19.0  ----------------------------<  95  4.6   |  24.0  |  0.77    Ca    9.7      11 Aug 2018 10:48    TPro  7.5  /  Alb  4.2  /  TBili  0.6  /  DBili  x   /  AST  13  /  ALT  12  /  AlkPhos  103  08-11        PT/INR - ( 11 Aug 2018 10:48 )   PT: 11.4 sec;   INR: 1.04 ratio         PTT - ( 11 Aug 2018 10:48 )  PTT:33.5 sec    BNPSerum Pro-Brain Natriuretic Peptide: 1327 pg/mL (08-11 @ 10:48)      RADIOLOGY & ADDITIONAL STUDIES:  CXR (image reviewed by me): < from: Xray Chest 1 View AP/PA. (08.11.18 @ 11:13) >  IMPRESSION: Small bilateral pleural effusions noted, underlying   infiltrate and/or atelectasis cannot be excluded. Status post CABG.           < end of copied text >

## 2018-08-11 NOTE — ED ADULT NURSE REASSESSMENT NOTE - NS ED NURSE REASSESS COMMENT FT1
Pt in no acute distress, returned from echo and returned to hardwire cardiac and O2 monitoring. Await results and dispo and will continue to monitor.

## 2018-08-11 NOTE — ED PROVIDER NOTE - CRITICAL CARE INDICATION, MLM
patient was critically ill... Patient was critically ill with a high probability of imminent or life threatening deterioration. SYMPTOMATIC BRADYCARDIA - SOB. S/P 4 VESSEL CABG\STAT IV X 2 STAT BC , LAB, EKG, XRAY, CARDIOLOGY CONSULT Patient was critically ill with a high probability of imminent or life threatening deterioration. SYMPTOMATIC BRADYCARDIA - SOB. S/P 4 VESSEL CABG\STAT IV X 2 STAT BC , LAB, EKG, XRAY, CARDIOLOGY CONSULT, CARDIO THORACIC EVAL. DRAINAGE OF PUSTULE ON INCISION REEVALUATION, DISPO

## 2018-08-11 NOTE — CHART NOTE - NSCHARTNOTEFT_GEN_A_CORE
Spoke to the ct surgery pa in the cticu and informed her that the patient was in the ed and would likely need admission.   patient was under ct surgery service last month and will likely go back to their service.   will be available if needed

## 2018-08-11 NOTE — CONSULT NOTE ADULT - PROBLEM SELECTOR RECOMMENDATION 9
cardiology consult  echo  fingerstick  d/w dr Messer if cardiology feels this warrants inpatient workup will admit to cts service.  Will follow up TTE, but at this time no immediate surgical intervention warranted.

## 2018-08-11 NOTE — CONSULT NOTE ADULT - ASSESSMENT
reduce metoprolol to 25 mg daily  may come into the office next week for follow up 74 y/o female from [place], smoker, with hypertension, multivessel CAD s/p 4 vessel CABG presents with abdominal pain/nausea yesterday, developed acute onset dizziness and shortness of breath overnight, unable to sleep.  Sternal chest pain.  No bleeding, but does report black stools (also on iron supplements). On arrival, HR 30s, per ED staff "asymptomatic", mentating well, hemodynamically stable.  Prior to discharge last admission for CABG, was started on metoprolol and uptitrated to 50 mg bid prior to discharge.  No syncope.      multivessel CAD s/p 4 vessel CABG  sinus bradycardia, potentially medication induced  dyspnea - may be related to bradycardia  black stools - likely related to iron supplements.  H&H higher compared with previous.     reduce metoprolol to 25 mg daily  decrease aspirin to 81 mg daily  GI  cocktail  may come into the office next week for follow up - HR  patient plans to leave for [place] next week, will establish care with cardiologist then  resume all other meds    Thank you for allowing me to participate in care of your patient.   Please call as needed  D/W CTS PA, Dr. Adam

## 2018-08-11 NOTE — ED PROVIDER NOTE - MEDICAL DECISION MAKING DETAILS
74 y/o F presents with SOB at rest and bradycardia, likely due to metoprolol. Consult cardio and cardiothoracic surgery.  Check labs, EKG and CXR and admit for further treatement.

## 2018-08-11 NOTE — PROGRESS NOTE ADULT - SUBJECTIVE AND OBJECTIVE BOX
Pt noted to have  pea sized, superficial pustule to lower pole of sternum.  No surrounding erythema.  Sternum stable and appears to be healing well otherwise.  Pt states it has been getting larger over the last 2 weeks.  No recent fevers.  WBC wnl.  Discussed with Dr. Manuel.  Expressed white, thick material from the pustule and cleansed with betadine as instructed by Dr. Manuel.  Pt and family instructed to cleanse twice a day with betadine swabs, as well as to shower daily and let water run over the incision. Pt to be discharged on Levaquin x 2 weeks, as well as a probiotic. Instructed to call the office or to report to the ER if collection reaccumulates, if it becomes erythematous, or if she develops a fever. Pt noted to have  pea sized, superficial pustule to lower pole of sternum.  No surrounding erythema.  Sternum stable and appears to be healing well otherwise.  Pt states it has been getting larger over the last 2 weeks.  No recent fevers.  WBC wnl.  Discussed with Dr. Manuel.  Expressed white, thick material from the pustule and cleansed with betadine as instructed by Dr. Manuel.  Pt and family instructed to cleanse twice a day with betadine swabs, as well as to shower daily and let water run over the incision. Pt to be discharged on Levaquin x 2 weeks, as well as a probiotic. Instructed to call the office or to report to the ER if collection reaccumulates, if it becomes erythematous, or if she develops a fever.  Discussed all above with ER attending, Dr. Adam.

## 2018-08-11 NOTE — ED PROVIDER NOTE - CHPI ED SYMPTOMS NEG
no chills/no fever/nausea, abdominal pain nausea, abdominal pain, blurred vision, numbness, tingling/no chills/no fever

## 2018-08-11 NOTE — CONSULT NOTE ADULT - ASSESSMENT
73F h/o CABG x4 7/8 presents with symptoms of SOB, dizziness, nausea/vomiting while bradycardic.  Once on monitor symptoms resolved when HR 60.  Currently patient stable, NAD.

## 2018-08-11 NOTE — ED ADULT NURSE NOTE - OBJECTIVE STATEMENT
Pt with diarrhea yesterday and vomiting today. Pt with h/o open heart surgery on 7/11/18, report pain at incision site.

## 2018-08-11 NOTE — ED ADULT NURSE REASSESSMENT NOTE - NS ED NURSE REASSESS COMMENT FT1
Pt in no acute distress, vss, transported via stretcher with cardiac and O2 monitoring in place, safety maintained and endorsed to DARRON Davis for safe monitoring until return to ED.

## 2018-08-11 NOTE — PROGRESS NOTE ADULT - SUBJECTIVE AND OBJECTIVE BOX
Pt HR remains stable SR 60's.  No further bradycardia. Pt denies dizziness or current nausea. Discussed case with Dr. Larson.  Pt to be discharged home on Lopressor 12.5mg BID, Protonix, and follow up with Dr. Pulliam's office this upcoming week for evaluation.  TTE preliminarily unremarkable per Dr. Larson.  Discussed with pt and family, as well as ER attending, Dr. Adam, as well as Dr. Messer and Dr. Manuel.

## 2018-08-11 NOTE — ED PROVIDER NOTE - OBJECTIVE STATEMENT
74 y/o F pt with PMHx HTN and CAD (NSTEMI, CABG x4) presents to the ED c/o sudden onset SOB with exertion and at rest today.  As per relative, she had diarrhea yesterday and then began vomiting yesterday and at 3am this morning.  She had bypass surgery with Dr. Manuel July 11th 2018.  On metoprolol 50mg BID.  Denies fever, chills, nausea, abdominal pain.  No further acute complaints at this time. 74 y/o F pt with PMHx HTN and CAD (NSTEMI, CABG x4) presents to the ED c/o sudden onset SOB with exertion and at rest today.  As per relative, she had diarrhea yesterday and then began vomiting yesterday and at 3am this morning.  She had bypass surgery with Dr. Manuel July 11th 2018.  Pt is on metoprolol 50mg BID, did not take medications this morning.  As per relative, her appetite has been good recently.  Denies fever, chills, nausea, abdominal pain, blurred vision, numbness, tingling.  No further acute complaints at this time.

## 2018-08-11 NOTE — ED PROVIDER NOTE - PROGRESS NOTE DETAILS
OBSERVED OVER FOUR HOURS. HEART RATE OVER 60. SEEN BY CARDIO AND CARDIOTHORACIC. METOPROLOL TO BE DECREASED TO 12.5 MG BID. PUSTULE ON INCISION DRAINED AND DR BUNDY WANTS PT ON LEVAQUIN 500 MG QD X 14 DAYS AND PROBIOTIC. PT WILL SEE DR SPICER ON MONDAY FOR ANNN HOLTER MONITOR  ALL OF THIS REVIEWED WITH FAMILY WHO VERBALIZED UNDERSTANDING

## 2018-08-11 NOTE — ED ADULT NURSE NOTE - NSIMPLEMENTINTERV_GEN_ALL_ED
Implemented All Universal Safety Interventions:  [place] to call system. Call bell, personal items and telephone within reach. Instruct patient to call for assistance. Room bathroom lighting operational. Non-slip footwear when patient is off stretcher. Physically safe environment: no spills, clutter or unnecessary equipment. Stretcher in lowest position, wheels locked, appropriate side rails in place.

## 2018-08-11 NOTE — ED PROVIDER NOTE - CRITICAL CARE PROVIDED
40 MINUTES/additional history taking/direct patient care (not related to procedure)/documentation/consult w/ pt's family directly relating to pts condition/interpretation of diagnostic studies/consultation with other physicians additional history taking/consult w/ pt's family directly relating to pts condition/direct patient care (not related to procedure)/interpretation of diagnostic studies/consultation with other physicians/documentation/90 MINUTES

## 2018-08-11 NOTE — CONSULT NOTE ADULT - SUBJECTIVE AND OBJECTIVE BOX
Surgeon: Gui    Consult requesting by: Kia    HISTORY OF PRESENT ILLNESS:  73F [place] speaking presents with history of SOB, nausea, vomiting since last night.  Family states she was in her usual state of health and then started complaining she wasn't feeling well.  They checked her BP and noted it was 98/54.  She went to sleep and still didn't feel better in the morning so they brought her in.  Upon arrival patient found with HR 30 c/o presyncope, SOB, nausea.  Approx 45 minutes later, heartrate increased to 60 and patient asymptomatic.  Currently denies SOB, syncope, presyncope, CP, N/V.    PAST MEDICAL & SURGICAL HISTORY:  Smoker: quit on 7/6 admission  S/P CABG x 4  NSTEMI (non-ST elevated myocardial infarction)  Coronary artery disease involving native coronary artery of native heart, angina presence unspecified  HTN (hypertension)  S/P CABG x 4      MEDICATIONS  (STANDING):    MEDICATIONS  (PRN):    Antiplatelet therapy:    asa                       Last dose/amt:    Allergies    latex (Unknown)  No Known Drug Allergies    Intolerances    [facility] PT (Unknown)      SOCIAL HISTORY:  Smoker: [x ] Yes  [ ] No        PACK YEARS:                         WHEN QUIT? 7.18  ETOH use: [ ] Yes  [ x] No              FREQUENCY / QUANTITY:  I[place] Drug use:  [ ] Yes  [x ] No  Occupation: retired  Live with: daughter  Assisted device use:    FAMILY HISTORY:  No pertinent family history in first degree relatives      Review of Systems  CONSTITUTIONAL:  Fevers[ ] chills[ ] sweats[ ] fatigue[ ] weight loss[ ] weight gain [ ]                                     NEGATIVE [x ]   NEURO:  parathesias[ ] seizures [ ]  syncope [ ]  confusion [ ]                                                                                NEGATIVE[x ]   EYES: glasses[ ]  blurry vision[ ]  discharge[ ] pain[ ] glaucoma [ ]                                                                          NEGATIVE[x ]   ENMT:  difficulty hearing [ ]  vertigo[ ]  dysphagia[ ] epistaxis[ ] recent dental work [ ]                                    NEGATIVE[x ]   CV:  chest pain[ ] palpitations[ ] LAYNE [ x] diaphoresis [ ]                                                                                           NEGATIVE[]   RESPIRATORY:  wheezing[ ] SOB[ ] cough [ ] sputum[ ] hemoptysis[ ]                                                                  NEGATIVE[x ]   GI:  nausea[ ]  vommiting [ ]  diarrhea[ ] constipation [ ] melena [ ]                                                                      NEGATIVE[x ]   : hematuria[ ]  dysuria[ ] urgency[ ] incontinence[ ]                                                                                            NEGATIVE[x ]   MUSKULOSKELETAL:  arthritis[ ]  joint swelling [ ] muscle weakness [ ]                                                                NEGATIVE[x ]   SKIN/BREAST:  rash[ ] itching [ ]  hair loss[ ] masses[ ]                                                                                              NEGATIVE[x ]   PSYCH:  dementia [ ] depresion [ ] anxiety[ ]                                                                                                               NEGATIVE[x ]   HEME/LYMPH:  bruises easily[ ] enlarged lymph nodes[ ] tender lymph nodes[ ]                                               NEGATIVE[ x]   ENDOCRINE:  cold intolerance[ ] heat intolerance[ ] polydipsia[ ]                                                                          NEGATIVE[x ]     PHYSICAL EXAM  Vital Signs Last 24 Hrs  T(C): 36.5 (11 Aug 2018 10:37), Max: 36.6 (11 Aug 2018 10:31)  T(F): 97.7 (11 Aug 2018 10:37), Max: 97.8 (11 Aug 2018 10:31)  HR: 38 (11 Aug 2018 10:37) (30 - 38)  BP: 114/56 (11 Aug 2018 10:37) (105/64 - 114/56)  BP(mean): --  RR: 18 (11 Aug 2018 10:37) (18 - 20)  SpO2: 98% (11 Aug 2018 10:31) (98% - 98%)    CONSTITUTIONAL:                                                                          WNL[ ]   Neuro: WNL[x ] Normal exam oriented to person/place & time with no focal motor or sensory  deficits. Other __________                    Eyes: WNL[x ]   Normal exam of conjunctiva & lids, pupils equally reactive. Other______________________________     ENT: WNL[x ]    Normal exam of nasal/oral mucosa with absence of cyanosis. Other_____________________________  Neck: WNL[x ]  Normal exam of jugular veins, trachea & thyroid. Other_________________________________________  Chest: WNL[x ] Normal lung exam with good air movement absence of wheezes, rales, or rhonchi: Other_________________________________________                                                                                CV:  Auscultation: normal [x ] S3[ ] S4[ ] Irregular [ ] Rub[ ] Clicks[ ]    Murmurs none:[ x]systolic [ ]  diastolic [ ] holosystolic [ ]  Carotids: No Bruits[x ] Other____________ Abdominal Aorta: normal [ ] nonpalpable[ ]Other___________                                                                                      GI: WNL[ x] Normal exam of abdomen, liver & spleen with no noted masses or tenderness. Other______________________                                                                                                        Extremities: WNL[ x] Normal no evidence of cyanosis or deformity Edema: none[ ]trace[ ]1+[ ]2+[ ]3+[ ]4+[ ]  Lower Extremity Pulses: Right[ x] Leftx[ ]Varicosities[ ]  SKIN :  Midsternal incision c/d/i, no click.  +pustule lower pole of incision (patient states its been there for weeks)  LLE incision c/d/i well healed                                                          LABS:                        10.8   7.9   )-----------( 311      ( 11 Aug 2018 10:48 )             33.5     08-11    136  |  95<L>  |  19.0  ----------------------------<  95  4.6   |  24.0  |  0.77    Ca    9.7      11 Aug 2018 10:48    TPro  7.5  /  Alb  4.2  /  TBili  0.6  /  DBili  x   /  AST  13  /  ALT  12  /  AlkPhos  103  08-11    PT/INR - ( 11 Aug 2018 10:48 )   PT: 11.4 sec;   INR: 1.04 ratio         PTT - ( 11 Aug 2018 10:48 )  PTT:33.5 sec          EKG:  NSR    TTE / VANESSA: Pending

## 2018-08-11 NOTE — ED ADULT TRIAGE NOTE - ARRIVAL FROM
[facility] GROUP PALLIATIVE CARE SPECIALTY CONSULT NOTE    Patient: Scott Mckeon Sr. Date: 2018   : 1968 Attending: Marvin Ballesteros MD   49 year old male PCP: Betet Giron MD   MRN: [id number]  Date of Admission: 2018     Consult requested by Dr. Tequila Mnaning to assist with goal clarification in context of Lung Cancer    HISTORY OF PRESENT ILLNESS:   49 year old male with history of Lung Cancer. Admitted 2018 due to worsening ascites.   Per chart review:   Per chart review: Patient well known to writer.   In short, patient with stage IV NCSLC with brain metastases, empyema/hydropneumothorax with chest tube, strep Anginosus on pleural fluid, chronic pain, and history of bilateral MCA infarct. He was started on Opdivo 3/26. He completed cycle 5 of Opdivo on 18. He had been doing well with the Opdivo. Required less oxygen, up and about, eating well, and pain has been well controlled.   Presented to the ED on  with c/ abdominal pain and swelling. CT abd/pelvis showed interval increase in size pericardial effusion as well as bilateral pleural effusion, moderate-to-large volume ascites, extensive periportal edema and mild lymphadenopathy. Admitted for for further evaluation and treatment. Echo was suggestive of cardiac tamponade.   Earlier this morning patient went into asystole and a code was called. Emergent pericardiocentesis was performed with removal of 100 cc pericardial fluid. Patient was transferred to the ICU. He underwent a thoracentesis with placement of a chest tube.   Currently he is intubated and non-responsive on a propofol drip.      REVIEW OF SYSTEMS:   Please see \"Palliative Care Assessment by Domains\" below    ALLERGIES:  No Known Allergies    MEDICATIONS  Reviewed in Epic and notable for:   • pantoprazole  40 mg Intravenous Once   • fentanyl  1 patch Transdermal Once   • levETIRAcetam (KepPRA) IVPB  500 mg Intravenous 2 times per day   • ferrous  sulfate  325 mg Oral Daily with breakfast   • docusate sodium-sennosides  1 tablet Oral Nightly   • pregabalin  50 mg Oral Nightly   • polyethylene glycol  17 g Oral Daily   • gabapentin  600 mg Oral TID   • lidocaine  1 patch Transdermal Daily   • sodium chloride (PF)  2 mL Injection 2 times per day   • lidocaine  1 patch Transdermal Nightly       PAST MEDICAL/SURGICAL HISTORY, SOCIAL HISTORY, FAMILY HISTORY  Reviewed in [facility] with relevant information as follows:  PMH:  Past Medical History:   Diagnosis Date   • Chronic pain     ; several medication trials   • History of ischemic multifocal multiple vascular territories stroke 12/08/2017 12-8-17 mainly R PCA R parietal MCA branch other scattered L cerebellum L MCA felt hypercoagulable also PFO   • Hx of ischemic left MCA stroke 02/20/2018    L M1 embolus from hypercoag state s/p embolectomy   • Kidney stone    • Non-small cell cancer of left lung (CMS/HCC) 11/01/2017   • PFO (patent foramen ovale) with bidirectional atrial shunt 12/19/2017     PSH:  Social History     Social History   • Marital status:      Spouse name: N/A   • Number of children: N/A   • Years of education: N/A     Occupational History   • Not on file.     Social History Main Topics   • Smoking status: Former Smoker     Packs/day: 1.00     Years: 30.00     Types: Cigarettes     Quit date: 10/23/2017   • Smokeless tobacco: Never Used   • Alcohol use No   • Drug use: Yes     Frequency: 4.0 times per week     Types: Marijuana      Comment: after chemo treatments    • Sexual activity: Not on file     Other Topics Concern   • Not on file     Social History Narrative   • No narrative on file     FH:  Family History   Problem Relation Age of Onset   • Aneurysm Mother    • Hypertension Mother    • Hypertension Father    • Cancer, Breast Sister    • Myocardial Infarction Brother    • Hypertension Brother    • Heart disease Brother    • Hypertension Brother    • Other Brother         Cyst on lung        OBJECTIVE  Vitals with minutes/maximum:  Vitals Last Value 24 Hour Range   Temperature 95.2 °F (35.1 °C) (06/04/18 1000) Temp  Min: 94.5 °F (34.7 °C)  Max: 98.3 °F (36.8 °C)   Pulse 96 (06/04/18 1000) Pulse  Min: 82  Max: 125   Respiratory 19 (06/04/18 0900) Resp  Min: 18  Max: 30   Non-Invasive  Blood Pressure 120/80 (06/04/18 0334) BP  Min: 80/58  Max: 128/84   Pulse Oximetry 100 % (06/04/18 1000) SpO2  Min: 96 %  Max: 100 %   Arterial   Blood Pressure 121/90 (06/04/18 1000) Arterial Line BP  Min: 121/90  Max: 180/103   LBM: 1 (small, per pt) (06/04/18 0341)     Intake/Output Summary (Last 24 hours) at 06/04/18 1043  Last data filed at 06/04/18 0950   Gross per 24 hour   Intake              720 ml   Output              425 ml   Net              295 ml     Recent Weights: Weight    06/02/18 1456 06/02/18 2010 06/03/18 0526 06/04/18 0334   Weight: 67 kg 66.2 kg 66.2 kg 61.9 kg       PHYSICAL EXAM  CONSTITUTIONAL: sedated; unresponsive  HEENT:intubated   RESPIRATORY: on the ventilator  CARDIOVASCULAR: Regular rate and rhythm  CHEST: Chest tube left  ABDOMEN: Ascites  EXTREMITIES: No visible deformities. No cyanosis or edema.  INTEGUMENTARY: Skin warm and dry  NEUROLOGIC: sedated; unresponsive; myoclonic jerks        LABS:  Reviewed in Epic and notable for:    Recent Labs  Lab 06/04/18  0650 06/03/18  0740 06/02/18  1528 05/29/18  1048   SODIUM 138 139 140 138   POTASSIUM 4.0 4.4 4.0 3.9   BUN 39* 25* 21* 13   CREATININE 1.95* 1.09 0.93 0.67   ALBUMIN  --   --  3.4* 3.2*   AST  --   --  44* 16   GPT  --   --  48 15   ALKPT  --   --  91 66   BILIRUBIN  --   --  0.2 0.2       Recent Labs  Lab 06/04/18  0650 06/03/18  0740 06/02/18  1528   WBC 11.0 7.8 9.4   HGB 7.5* 9.0* 9.3*    421 399   INR 1.8  --   --          IMAGING/PROCEDURES  Reviewed in Epic and notable for:  CT Chest/Abdomen/Pelvis 6/2/18  IMPRESSION:     1.  Significant interval increase in size of a now large pericardial  effusion.   2.  Trace  bilateral pleural effusions, left greater than right. Adjacent  consolidation may be atelectatic and/or pneumonic.   3.  Moderate-to-large volume ascites.  4.  Extensive periportal edema.  5.  Gallbladder sludge versus vicarious contrast excretion.  6.  Mild abdominal lymphadenopathy may be reactive or metastatic.  7.  Stable 1.3 cm low-density lesion within the lower pole the left kidney  may represent a cyst.    CXR 18  IMPRESSION:    1.  Interval intubation, placement of NG tube, and left-sided venous  catheter.  2.  Since a comparison of 2018 there has been a dramatic increase in  airspace opacity in the left lung, associated with slight, unchanged volume  loss.  3.  A developing amount of left apical pleural fluid appears to have been  significantly drained following placement of a solitary left apical chest  tube. No evidence for pneumothorax.      PALLIATIVE CARE ASSESSMENT, BY DOMAIN  Medical   Pertinent Dx: metastatic non-small cell lung cancer with mets to brain, lung, pericardium  Secondary Dx: hx bilateral MCA infarcts    Symptoms:  Patient unable to verbally respond due to intubation and sedation. Some myoclonic jerking    Functional Status:Current ECO: completely disabled. Cannot carry on any self care. Totally confined to bed or chair.     Prognosis (quality & quantity): Unknown at this time.   Basis for estimate: Clinical judgement  High risk of death within one year? 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   Care Transitions Continuity health care providers: MD Dr. Kerri Fonseca  Anticipated change in  disposition? yes     Ethical, Legal Decision-making capacity: not decisional (likely permanent).   Advanced Care Planning Document: no document.  · Patient has declined to complete a HCPOA each time it has been discussed in the past.  · At one time he did sign a [place] DNR bracelet, but he revoked that and is currently a FULL CODE         COUNSELING AND CARE COORDINATION  PLAN OF CARE Discussed with Mera España NP; Dr. Manning; Dr. Ballesteros; ROHINI Maradiaga   GOALS OF CARE     Family Conference/Goals of Care Note    Patient: Scott Mckeon Sr. Date: 6/4/2018   YOB: 1968 Attending: Marvin Ballesteros MD   49 year old male      Meeting Leader: Dr. Manning  Location: Patient's room  Purpose of Meeting: information sharing  Decision Maker: Family consensus  Participants: wife, 2 daughters, son, patient's brother, nieces  Patient Able to Participate: No (due to intubated and sedated)    Patient's Current Medical Condition:  Diagnosis: metastatic non-small cell lung cancer; s/p PEA arrest earlier this morning; pericardial effusion; pleural effusion; MAYLIN    Process:  Medical Update: Presented current status of lung cancer. Suspect ascites and pericardial effusions are related to worsening cancer. Discussed possible causes for PEA arrest this morning and that he is at risk for another arrest.  Goals: patient has always been clear that he wanted \"everything done\". Reviewed with family that patient really has gone above and beyond everything that we offered him. We did recommend changing code status to DNR but family wanted to talk further amongst themselves. They would like to give patient \"more time\" to see if his neurological status can improve so he can be involved in making further decisions.       Patient and Family Goals:  No changes in treatment plan at this time.   Family to talk amongst themselves re: code status .    Plan to meet again tomorrow      TIME Total encounter time: 60  minutes, with the  majority of this time spent counseling and coordinating care.          Plan (RECOMMENDATIONS AND COMMENTS)  Continue with current treatment plan  Will meet with family again tomorrow.      Thank you, Dr. Tequila Manning, for involving Palliative Care in the care of Scott Mckeon SrAmie . We will follow along closely with you.         AGUSTÍN Carey  [facility]  [id number]    Mon-Fri, between the hours of 8am and 4pm, please contact me directly at pager [phone]  After hours or weekends, please call the on-call answering service at phone: [phone].     Home

## 2018-08-14 ENCOUNTER — RECORD ABSTRACTING (OUTPATIENT)
Age: 73
End: 2018-08-14

## 2018-08-14 ENCOUNTER — APPOINTMENT (OUTPATIENT)
Dept: CARDIOLOGY | Facility: CLINIC | Age: 73
End: 2018-08-14
Payer: MEDICAID

## 2018-08-14 ENCOUNTER — NON-APPOINTMENT (OUTPATIENT)
Age: 73
End: 2018-08-14

## 2018-08-14 VITALS
BODY MASS INDEX: 26.31 KG/M2 | DIASTOLIC BLOOD PRESSURE: 60 MMHG | HEIGHT: 62 IN | WEIGHT: 143 LBS | SYSTOLIC BLOOD PRESSURE: 100 MMHG

## 2018-08-14 DIAGNOSIS — I25.10 ATHEROSCLEROTIC HEART DISEASE OF NATIVE CORONARY ARTERY W/OUT ANGINA PECTORIS: ICD-10-CM

## 2018-08-14 DIAGNOSIS — Z86.39 PERSONAL HISTORY OF OTHER ENDOCRINE, NUTRITIONAL AND METABOLIC DISEASE: ICD-10-CM

## 2018-08-14 DIAGNOSIS — Z86.79 PERSONAL HISTORY OF OTHER DISEASES OF THE CIRCULATORY SYSTEM: ICD-10-CM

## 2018-08-14 DIAGNOSIS — R00.1 BRADYCARDIA, UNSPECIFIED: ICD-10-CM

## 2018-08-14 PROCEDURE — 93000 ELECTROCARDIOGRAM COMPLETE: CPT | Mod: 59

## 2018-08-14 PROCEDURE — 99214 OFFICE O/P EST MOD 30 MIN: CPT | Mod: 25

## 2018-09-12 NOTE — PHYSICAL THERAPY INITIAL EVALUATION ADULT - REFERRING PHYSICIAN, REHAB EVAL
Dr. Manuel Complex Repair And V-Y Plasty Text: The defect edges were debeveled with a #15 scalpel blade.  The primary defect was closed partially with a complex linear closure.  Given the location of the remaining defect, shape of the defect and the proximity to free margins a V-Y plasty was deemed most appropriate for complete closure of the defect.  Using a sterile surgical marker, an appropriate advancement flap was drawn incorporating the defect and placing the expected incisions within the relaxed skin tension lines where possible.    The area thus outlined was incised deep to adipose tissue with a #15 scalpel blade.  The skin margins were undermined to an appropriate distance in all directions utilizing iris scissors.

## 2018-10-02 PROBLEM — Z86.79 HISTORY OF HYPERTENSION: Status: RESOLVED | Noted: 2018-07-17 | Resolved: 2018-10-02

## 2018-10-02 PROBLEM — Z86.39 HISTORY OF HYPERLIPIDEMIA: Status: RESOLVED | Noted: 2018-07-17 | Resolved: 2018-10-02

## 2018-11-06 NOTE — DISCHARGE NOTE ADULT - HOSPITAL COURSE
We have received your remote transmission. Our staff will contact you if there is anything that needs to be discussed. Your next appointment is 02/01/2019 remote transmission from home. 73 year old female with significant PMH of HTN, CAD, and active smoker (since age 17, ~ 1  per 3 days), initially presented to [facility] on 7/6/18 with unstable angina, ruled in NSTEMI, LHC showed TVD, at which time CT surgery was consulted. Preop, beta blocker was initially withheld due to bradycardia and she had chest pain found to be anxiety ridden with negative work up. 7/11 s/p CABG x 4 (LIMA-LAD, SVG-OM, sequential SVG- RCA/PDA, with left saphenous EVH.  7/11- Extubated with PaO2 60s-70s, given history of active smoking hypoxic respiratory issues expected, treated with duonebs and solucortef given wheezing, hypoxemic (SpO2 sustaining at 89-90%) so HF NC was started, Cardene transitioned to NTG gtt given possibility of shunting contributing to hypoxemia, autodiuresing  7/12- Solucortef completed, seen by Pulmonary Symbicort added

## 2018-12-26 PROCEDURE — 93312 ECHO TRANSESOPHAGEAL: CPT

## 2018-12-26 PROCEDURE — 84132 ASSAY OF SERUM POTASSIUM: CPT

## 2018-12-26 PROCEDURE — P9045: CPT

## 2018-12-26 PROCEDURE — 71046 X-RAY EXAM CHEST 2 VIEWS: CPT

## 2018-12-26 PROCEDURE — 84436 ASSAY OF TOTAL THYROXINE: CPT

## 2018-12-26 PROCEDURE — 93458 L HRT ARTERY/VENTRICLE ANGIO: CPT

## 2018-12-26 PROCEDURE — P9012: CPT

## 2018-12-26 PROCEDURE — P9016: CPT

## 2018-12-26 PROCEDURE — 97116 GAIT TRAINING THERAPY: CPT

## 2018-12-26 PROCEDURE — 83036 HEMOGLOBIN GLYCOSYLATED A1C: CPT

## 2018-12-26 PROCEDURE — 82550 ASSAY OF CK (CPK): CPT

## 2018-12-26 PROCEDURE — 93005 ELECTROCARDIOGRAM TRACING: CPT

## 2018-12-26 PROCEDURE — 94002 VENT MGMT INPAT INIT DAY: CPT

## 2018-12-26 PROCEDURE — 36430 TRANSFUSION BLD/BLD COMPNT: CPT

## 2018-12-26 PROCEDURE — 83880 ASSAY OF NATRIURETIC PEPTIDE: CPT

## 2018-12-26 PROCEDURE — 99152 MOD SED SAME PHYS/QHP 5/>YRS: CPT

## 2018-12-26 PROCEDURE — 93320 DOPPLER ECHO COMPLETE: CPT

## 2018-12-26 PROCEDURE — 82803 BLOOD GASES ANY COMBINATION: CPT

## 2018-12-26 PROCEDURE — 86923 COMPATIBILITY TEST ELECTRIC: CPT

## 2018-12-26 PROCEDURE — 81001 URINALYSIS AUTO W/SCOPE: CPT

## 2018-12-26 PROCEDURE — 86850 RBC ANTIBODY SCREEN: CPT

## 2018-12-26 PROCEDURE — 86965 POOLING BLOOD PLATELETS: CPT

## 2018-12-26 PROCEDURE — 96375 TX/PRO/DX INJ NEW DRUG ADDON: CPT | Mod: XU

## 2018-12-26 PROCEDURE — 99285 EMERGENCY DEPT VISIT HI MDM: CPT | Mod: 25

## 2018-12-26 PROCEDURE — 85610 PROTHROMBIN TIME: CPT

## 2018-12-26 PROCEDURE — 84484 ASSAY OF TROPONIN QUANT: CPT

## 2018-12-26 PROCEDURE — C1889: CPT

## 2018-12-26 PROCEDURE — 83605 ASSAY OF LACTIC ACID: CPT

## 2018-12-26 PROCEDURE — 84134 ASSAY OF PREALBUMIN: CPT

## 2018-12-26 PROCEDURE — 83690 ASSAY OF LIPASE: CPT

## 2018-12-26 PROCEDURE — 82962 GLUCOSE BLOOD TEST: CPT

## 2018-12-26 PROCEDURE — 94770: CPT

## 2018-12-26 PROCEDURE — 96374 THER/PROPH/DIAG INJ IV PUSH: CPT | Mod: XU

## 2018-12-26 PROCEDURE — 94760 N-INVAS EAR/PLS OXIMETRY 1: CPT

## 2018-12-26 PROCEDURE — 71260 CT THORAX DX C+: CPT

## 2018-12-26 PROCEDURE — 80053 COMPREHEN METABOLIC PANEL: CPT

## 2018-12-26 PROCEDURE — 86901 BLOOD TYPING SEROLOGIC RH(D): CPT

## 2018-12-26 PROCEDURE — 84295 ASSAY OF SERUM SODIUM: CPT

## 2018-12-26 PROCEDURE — 82947 ASSAY GLUCOSE BLOOD QUANT: CPT

## 2018-12-26 PROCEDURE — 87641 MR-STAPH DNA AMP PROBE: CPT

## 2018-12-26 PROCEDURE — 86900 BLOOD TYPING SEROLOGIC ABO: CPT

## 2018-12-26 PROCEDURE — 36600 WITHDRAWAL OF ARTERIAL BLOOD: CPT

## 2018-12-26 PROCEDURE — T1013: CPT

## 2018-12-26 PROCEDURE — 71045 X-RAY EXAM CHEST 1 VIEW: CPT

## 2018-12-26 PROCEDURE — 80048 BASIC METABOLIC PNL TOTAL CA: CPT

## 2018-12-26 PROCEDURE — 80061 LIPID PANEL: CPT

## 2018-12-26 PROCEDURE — 94010 BREATHING CAPACITY TEST: CPT

## 2018-12-26 PROCEDURE — 97163 PT EVAL HIGH COMPLEX 45 MIN: CPT

## 2018-12-26 PROCEDURE — 97110 THERAPEUTIC EXERCISES: CPT

## 2018-12-26 PROCEDURE — 84100 ASSAY OF PHOSPHORUS: CPT

## 2018-12-26 PROCEDURE — 82330 ASSAY OF CALCIUM: CPT

## 2018-12-26 PROCEDURE — 97530 THERAPEUTIC ACTIVITIES: CPT

## 2018-12-26 PROCEDURE — 82435 ASSAY OF BLOOD CHLORIDE: CPT

## 2018-12-26 PROCEDURE — 87640 STAPH A DNA AMP PROBE: CPT

## 2018-12-26 PROCEDURE — 84443 ASSAY THYROID STIM HORMONE: CPT

## 2018-12-26 PROCEDURE — 82553 CREATINE MB FRACTION: CPT

## 2018-12-26 PROCEDURE — 93325 DOPPLER ECHO COLOR FLOW MAPG: CPT

## 2018-12-26 PROCEDURE — 36415 COLL VENOUS BLD VENIPUNCTURE: CPT

## 2018-12-26 PROCEDURE — 80076 HEPATIC FUNCTION PANEL: CPT

## 2018-12-26 PROCEDURE — 83735 ASSAY OF MAGNESIUM: CPT

## 2018-12-26 PROCEDURE — 93306 TTE W/DOPPLER COMPLETE: CPT

## 2018-12-26 PROCEDURE — P9037: CPT

## 2018-12-26 PROCEDURE — 85730 THROMBOPLASTIN TIME PARTIAL: CPT

## 2018-12-26 PROCEDURE — 74177 CT ABD & PELVIS W/CONTRAST: CPT

## 2018-12-26 PROCEDURE — 94640 AIRWAY INHALATION TREATMENT: CPT

## 2018-12-26 PROCEDURE — C1894: CPT

## 2018-12-26 PROCEDURE — 85027 COMPLETE CBC AUTOMATED: CPT

## 2018-12-26 PROCEDURE — 85014 HEMATOCRIT: CPT

## 2018-12-26 PROCEDURE — 93880 EXTRACRANIAL BILAT STUDY: CPT

## 2018-12-26 PROCEDURE — C1887: CPT

## 2020-03-23 NOTE — ED ADULT NURSE NOTE - NS ED NURSE RECORD ANOTHER HT AND WT
Patient hypotensive  during transfusion, MD Reagan aware, increase of rate of blood transfusion as per MD orders. Yes

## 2020-07-28 NOTE — ED PROVIDER NOTE - DATE/TIME 1
-STD tests ordered HIV and RPR, chlamydia, gonorrhea, and trich  -counseled on safe sex practices  -use contraception or abstinence for safety     11-Aug-2018 17:42

## 2021-05-06 NOTE — PROGRESS NOTE ADULT - PROBLEM/PLAN-5
Alisha, we will refill in 6 days when she comes to the office.
DISPLAY PLAN FREE TEXT

## 2022-06-16 NOTE — ED ADULT NURSE NOTE - PAIN RATING/NUMBER SCALE (0-10): ACTIVITY

## 2022-08-16 NOTE — PATIENT PROFILE ADULT. - SPIRITUAL CULTURAL, RELIGIOUS PRACTICES/VALUES, PROFILE
[de-identified] : Patient is WDWN, alert, and in no acute distress. Breathing is unlabored. She is grossly oriented to person, place, and time.\par \par Right Shoulder:\par Flexion: 70°\par Abd: 65°\par IR: Sacrum\par ER: 40°\par Full wrist and digital motion into flexion and extension.\par Sensation is intact along the deltoid as well as distally to the digits.      [de-identified] : EXAM: XR SHOULDER COMP MIN 2V RT\par PROCEDURE DATE: 08/14/2022\par IMPRESSION:\par No acute fracture. No dislocation. Joint spaces are maintained. Visualized lung appears clear.\par \par CORY VIDALES MD; Resident Radiologist\par This document has been electronically signed.\par GRAHAM GOMEZ MD; Attending Radiologist\par This document has been electronically signed. Aug 14 2022 8:26AM None

## 2023-02-24 NOTE — ED ADULT NURSE NOTE - CADM POA URETHRAL CATHETER
AFTER VISIT SUMMARY (AVS):    At today's visit we thoroughly discussed current symptoms, evaluation results, the plan.    We discussed option of neuropsychologic testing, but decided to monitor your symptoms over time.  Please come back if they worsen or you develop new neurological symptoms.    Next follow-up appointment is on as needed basis.    Please do not hesitate to call me with any questions or concerns.    Thanks.   
No

## 2023-06-12 NOTE — DISCHARGE NOTE ADULT - SECONDARY DIAGNOSIS.
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  HTN (hypertension) Smoker

## 2024-02-21 NOTE — CONSULT NOTE ADULT - PROBLEM SELECTOR PROBLEM 1
Coronary artery disease involving native coronary artery of native heart with unstable angina pectoris Show Aperture Variable?: Yes Render Note In Bullet Format When Appropriate: No Number Of Freeze-Thaw Cycles: 3 freeze-thaw cycles Consent: The patient's consent was obtained including but not limited to risks of crusting, scabbing, blistering, scarring, darker or lighter pigmentary change, recurrence, incomplete removal and infection. Detail Level: Detailed Post-Care Instructions: I reviewed with the patient in detail post-care instructions. Patient is to wear sunprotection, and avoid picking at any of the treated lesions. Pt may apply Vaseline to crusted or scabbing areas.

## 2024-03-02 ENCOUNTER — INPATIENT (INPATIENT)
Facility: HOSPITAL | Age: 79
LOS: 2 days | Discharge: HOME | DRG: 93 | End: 2024-03-05
Attending: STUDENT IN AN ORGANIZED HEALTH CARE EDUCATION/TRAINING PROGRAM | Admitting: HOSPITALIST
Payer: MEDICAID

## 2024-03-02 VITALS
DIASTOLIC BLOOD PRESSURE: 63 MMHG | SYSTOLIC BLOOD PRESSURE: 133 MMHG | RESPIRATION RATE: 18 BRPM | HEART RATE: 81 BPM | OXYGEN SATURATION: 98 % | TEMPERATURE: 98 F | WEIGHT: 179.24 LBS

## 2024-03-02 DIAGNOSIS — Z95.1 PRESENCE OF AORTOCORONARY BYPASS GRAFT: Chronic | ICD-10-CM

## 2024-03-02 LAB
ALBUMIN SERPL ELPH-MCNC: 4.2 G/DL — SIGNIFICANT CHANGE UP (ref 3.3–5.2)
ALP SERPL-CCNC: 99 U/L — SIGNIFICANT CHANGE UP (ref 40–120)
ALT FLD-CCNC: 14 U/L — SIGNIFICANT CHANGE UP
ANION GAP SERPL CALC-SCNC: 13 MMOL/L — SIGNIFICANT CHANGE UP (ref 5–17)
APTT BLD: 33.8 SEC — SIGNIFICANT CHANGE UP (ref 24.5–35.6)
AST SERPL-CCNC: 17 U/L — SIGNIFICANT CHANGE UP
BASE EXCESS BLDV CALC-SCNC: 2.2 MMOL/L — SIGNIFICANT CHANGE UP (ref -2–3)
BASOPHILS # BLD AUTO: 0.07 K/UL — SIGNIFICANT CHANGE UP (ref 0–0.2)
BASOPHILS NFR BLD AUTO: 0.9 % — SIGNIFICANT CHANGE UP (ref 0–2)
BILIRUB SERPL-MCNC: 0.6 MG/DL — SIGNIFICANT CHANGE UP (ref 0.4–2)
BUN SERPL-MCNC: 17.5 MG/DL — SIGNIFICANT CHANGE UP (ref 8–20)
CALCIUM SERPL-MCNC: 9 MG/DL — SIGNIFICANT CHANGE UP (ref 8.4–10.5)
CHLORIDE SERPL-SCNC: 97 MMOL/L — SIGNIFICANT CHANGE UP (ref 96–108)
CO2 SERPL-SCNC: 26 MMOL/L — SIGNIFICANT CHANGE UP (ref 22–29)
CREAT SERPL-MCNC: 0.82 MG/DL — SIGNIFICANT CHANGE UP (ref 0.5–1.3)
D DIMER BLD IA.RAPID-MCNC: 696 NG/ML DDU — HIGH
EGFR: 74 ML/MIN/1.73M2 — SIGNIFICANT CHANGE UP
EOSINOPHIL # BLD AUTO: 0.23 K/UL — SIGNIFICANT CHANGE UP (ref 0–0.5)
EOSINOPHIL NFR BLD AUTO: 2.8 % — SIGNIFICANT CHANGE UP (ref 0–6)
GLUCOSE SERPL-MCNC: 189 MG/DL — HIGH (ref 70–99)
HCO3 BLDV-SCNC: 28 MMOL/L — SIGNIFICANT CHANGE UP (ref 22–29)
HCT VFR BLD CALC: 35.2 % — SIGNIFICANT CHANGE UP (ref 34.5–45)
HGB BLD-MCNC: 11.6 G/DL — SIGNIFICANT CHANGE UP (ref 11.5–15.5)
IMM GRANULOCYTES NFR BLD AUTO: 0.4 % — SIGNIFICANT CHANGE UP (ref 0–0.9)
INR BLD: 1 RATIO — SIGNIFICANT CHANGE UP (ref 0.85–1.18)
LIDOCAIN IGE QN: 39 U/L — SIGNIFICANT CHANGE UP (ref 22–51)
LYMPHOCYTES # BLD AUTO: 2.04 K/UL — SIGNIFICANT CHANGE UP (ref 1–3.3)
LYMPHOCYTES # BLD AUTO: 24.9 % — SIGNIFICANT CHANGE UP (ref 13–44)
MAGNESIUM SERPL-MCNC: 2.2 MG/DL — SIGNIFICANT CHANGE UP (ref 1.6–2.6)
MCHC RBC-ENTMCNC: 31.7 PG — SIGNIFICANT CHANGE UP (ref 27–34)
MCHC RBC-ENTMCNC: 33 GM/DL — SIGNIFICANT CHANGE UP (ref 32–36)
MCV RBC AUTO: 96.2 FL — SIGNIFICANT CHANGE UP (ref 80–100)
MONOCYTES # BLD AUTO: 0.73 K/UL — SIGNIFICANT CHANGE UP (ref 0–0.9)
MONOCYTES NFR BLD AUTO: 8.9 % — SIGNIFICANT CHANGE UP (ref 2–14)
NEUTROPHILS # BLD AUTO: 5.09 K/UL — SIGNIFICANT CHANGE UP (ref 1.8–7.4)
NEUTROPHILS NFR BLD AUTO: 62.1 % — SIGNIFICANT CHANGE UP (ref 43–77)
NT-PROBNP SERPL-SCNC: 74 PG/ML — SIGNIFICANT CHANGE UP (ref 0–300)
PCO2 BLDV: 55 MMHG — HIGH (ref 39–42)
PH BLDV: 7.32 — SIGNIFICANT CHANGE UP (ref 7.32–7.43)
PLATELET # BLD AUTO: 220 K/UL — SIGNIFICANT CHANGE UP (ref 150–400)
PO2 BLDV: 68 MMHG — HIGH (ref 25–45)
POTASSIUM SERPL-MCNC: 3.6 MMOL/L — SIGNIFICANT CHANGE UP (ref 3.5–5.3)
POTASSIUM SERPL-SCNC: 3.6 MMOL/L — SIGNIFICANT CHANGE UP (ref 3.5–5.3)
PROT SERPL-MCNC: 7.1 G/DL — SIGNIFICANT CHANGE UP (ref 6.6–8.7)
PROTHROM AB SERPL-ACNC: 11.1 SEC — SIGNIFICANT CHANGE UP (ref 9.5–13)
RBC # BLD: 3.66 M/UL — LOW (ref 3.8–5.2)
RBC # FLD: 12 % — SIGNIFICANT CHANGE UP (ref 10.3–14.5)
SAO2 % BLDV: 94.1 % — SIGNIFICANT CHANGE UP
SODIUM SERPL-SCNC: 136 MMOL/L — SIGNIFICANT CHANGE UP (ref 135–145)
TROPONIN T, HIGH SENSITIVITY RESULT: 13 NG/L — SIGNIFICANT CHANGE UP (ref 0–51)
WBC # BLD: 8.19 K/UL — SIGNIFICANT CHANGE UP (ref 3.8–10.5)
WBC # FLD AUTO: 8.19 K/UL — SIGNIFICANT CHANGE UP (ref 3.8–10.5)

## 2024-03-02 PROCEDURE — 93010 ELECTROCARDIOGRAM REPORT: CPT

## 2024-03-02 PROCEDURE — 71046 X-RAY EXAM CHEST 2 VIEWS: CPT | Mod: 26

## 2024-03-02 PROCEDURE — 99285 EMERGENCY DEPT VISIT HI MDM: CPT

## 2024-03-02 PROCEDURE — 93970 EXTREMITY STUDY: CPT | Mod: 26

## 2024-03-02 RX ORDER — IPRATROPIUM/ALBUTEROL SULFATE 18-103MCG
3 AEROSOL WITH ADAPTER (GRAM) INHALATION ONCE
Refills: 0 | Status: COMPLETED | OUTPATIENT
Start: 2024-03-02 | End: 2024-03-02

## 2024-03-02 RX ADMIN — Medication 3 MILLILITER(S): at 21:13

## 2024-03-02 NOTE — ED ADULT NURSE NOTE - CHIEF COMPLAINT QUOTE
Regarding: follow up questions from triage 2 of 2  ----- Message from Indy Pathak sent at 12/4/2018 10:28 PM CST -----  .Patient Name: Veronica Purvis  Specialist or PCP:kinsey  Pregnant (If Yes, how long?):  Symptoms:follow up questions from triage 2 of 2  Call Back #:[phone]  Is the patient's permanent residence located in [place], [place], or a [facility]? Yes [facility] [id number]  Call Center Account #:[id number]   PT stated that she has been having SOB and chest pain starting 5/6PM as per pts family member she has been having some confusion. Hx of cardiac issues and COPD. EKG done in triage.

## 2024-03-02 NOTE — ED PROVIDER NOTE - ATTENDING CONTRIBUTION TO CARE
I, Sanjeev Weiner MD, personally saw the patient with the resident, and completed the key components of the history and physical exam. I then discussed the management plan with the resident.

## 2024-03-02 NOTE — ED PROVIDER NOTE - ST/T WAVE
no RAMESH consistent with ischemia though wavy baseline, there are twi in inferior and lateral leads that are similar to prior ecg

## 2024-03-02 NOTE — ED ADULT NURSE NOTE - NSFALLUNIVINTERV_ED_ALL_ED
Bed/Stretcher in lowest position, wheels locked, appropriate side rails in place/Call bell, personal items and telephone in reach/Instruct patient to call for assistance before getting out of bed/chair/stretcher/Non-slip footwear applied when patient is off stretcher/[place] to call system/Physically safe environment - no spills, clutter or unnecessary equipment/Purposeful proactive rounding/Room/bathroom lighting operational, light cord in reach

## 2024-03-02 NOTE — ED PROVIDER NOTE - CLINICAL SUMMARY MEDICAL DECISION MAKING FREE TEXT BOX
Given patient's past medical history, concern for ACS.  Will evaluate with EKG and troponin though reassured as EKG here appears similar to previous one per my independent interpretation inversions in the anterior and lateral leads.  Has diminished breath sounds bilaterally, will attempt DuoNeb treatment to help with her shortness of breath.  Will check D-dimer and lower extremity Dopplers in the setting of possible PE though reassured as she is not hypoxic or tachycardic.  Pending D-dimer, will ultimately need CT scan of her head given her concern of confusion.  Will also check for UTI, x-ray for pneumonia.  Lab work, imaging, anticipate cardiology consult.

## 2024-03-02 NOTE — ED ADULT NURSE NOTE - OBJECTIVE STATEMENT
Assumed pt care 2045. Pt A&Ox3. Pt c/o CP, SOB, HA, cough for several days worsening upon exertion. Pt also states new onset of bilateral calf pain. Pt denies any dizziness, vision changes, abd pain, N/V, fever/chills, and urinary symptoms at this time. Pt grandaughter at bedside. As per mikki pt is visiting from [facility] and noticed new onset of confusion. Pt states CP, SOB has improved upon arrival to hospital. PMH of CABG x4 and COPD, Resp even and unlabored. Pt placed on CM in NSR and . NAD present.

## 2024-03-02 NOTE — ED PROVIDER NOTE - PROGRESS NOTE DETAILS
JK- troponins, CT angio WNL. Patient currently asymptomatic. Cardiology consulted, recommended stress test, echo, serial trops. Official neurology recs pending. No FND, 7 mm aneurysm discussed with patient. MRA H/N ordered. Currently stable. Will admit to medicine for TIA work up as well as cardiac work up.

## 2024-03-02 NOTE — ED PROVIDER NOTE - PHYSICAL EXAMINATION
Constitutional: Awake, Alert, non-toxic. No acute distress.  HEAD: Normocephalic, atraumatic. No hematomas, contusions, lacerations, or signs of trauma seen on head/face/scalp.  EYES: PERRL, EOM intact, conjunctiva and sclera are clear bilaterally.  ENT: External ears normal. No rhinorrhea, no tracheal deviation   NECK: Supple, non-tender  CARDIOVASCULAR: regular rate and rhythm.  RESPIRATORY: Normal respiratory effort; breath sounds are diminished b/l without wheezing. Speaking in full sentences. No accessory muscle use.   ABDOMEN: Soft; non-tender, non-distended. No rebound or guarding.   MSK:  trace lower extremity edema, no deformities, +calf pain b/l  SKIN: Warm, dry  NEURO: A&O x3. Sensory and motor functions are grossly intact. Speech is normal. No facial droop. follows commands in all extremities  PSYCH: Appearance and judgement seem appropriate for gender and age. Constitutional: Awake, Alert, non-toxic. No acute distress.  HEAD: Normocephalic, atraumatic. No hematomas, contusions, lacerations, or signs of trauma seen on head/face/scalp.  EYES: PERRL, EOM intact, conjunctiva and sclera are clear bilaterally.  ENT: External ears normal. No rhinorrhea, no tracheal deviation   NECK: Supple, non-tender  CARDIOVASCULAR: regular rate and rhythm.  RESPIRATORY: Normal respiratory effort; breath sounds are diminished b/l without wheezing. Speaking in full sentences. No accessory muscle use.   ABDOMEN: Soft; non-tender, non-distended. No rebound or guarding.   MSK:  trace lower extremity edema, no deformities, +calf pain b/l  SKIN: Warm, dry  NEURO: A&O x2. Sensory and motor functions are grossly intact. Speech is normal. No facial droop. follows commands in all extremities  PSYCH: Appearance and judgement seem appropriate for gender and age.

## 2024-03-02 NOTE — ED ADULT TRIAGE NOTE - CHIEF COMPLAINT QUOTE
PT stated that she has been having SOB and chest pain starting 5/6PM as per pts family member she has been having some confusion. Hx of cardiac issues and COPD. EKG done in triage.

## 2024-03-02 NOTE — ED PROVIDER NOTE - OBJECTIVE STATEMENT
Patient with a past medical history of hypertension, coronary artery disease status post CABG x 4 currently on aspirin and Plavix, hyperlipidemia, COPD is presenting with complaints of chest pain, shortness of breath and also some confusion.  History obtained from granddaughter at bedside who was able to help interpret.  States that for the past few days she has been having chest pain with increasing dyspnea on exertion despite triage note stating this started at 5 or 6 PM.  Then today she noted some episodes of confusion as well per patient was not completely making sense.  Seems like she is doing better at this time.  Patient had recent travel from [place].  No history of known blood clots.  She does endorse bilateral calf pain.  Patient denies any dysuria, polyuria.

## 2024-03-03 ENCOUNTER — RESULT REVIEW (OUTPATIENT)
Age: 79
End: 2024-03-03

## 2024-03-03 DIAGNOSIS — R07.9 CHEST PAIN, UNSPECIFIED: ICD-10-CM

## 2024-03-03 DIAGNOSIS — I10 ESSENTIAL (PRIMARY) HYPERTENSION: ICD-10-CM

## 2024-03-03 DIAGNOSIS — R41.82 ALTERED MENTAL STATUS, UNSPECIFIED: ICD-10-CM

## 2024-03-03 LAB
ALBUMIN SERPL ELPH-MCNC: 4 G/DL — SIGNIFICANT CHANGE UP (ref 3.3–5.2)
ALP SERPL-CCNC: 97 U/L — SIGNIFICANT CHANGE UP (ref 40–120)
ALT FLD-CCNC: 13 U/L — SIGNIFICANT CHANGE UP
ANION GAP SERPL CALC-SCNC: 17 MMOL/L — SIGNIFICANT CHANGE UP (ref 5–17)
APPEARANCE UR: CLEAR — SIGNIFICANT CHANGE UP
AST SERPL-CCNC: 17 U/L — SIGNIFICANT CHANGE UP
BACTERIA # UR AUTO: NEGATIVE /HPF — SIGNIFICANT CHANGE UP
BASOPHILS # BLD AUTO: 0.09 K/UL — SIGNIFICANT CHANGE UP (ref 0–0.2)
BASOPHILS NFR BLD AUTO: 1 % — SIGNIFICANT CHANGE UP (ref 0–2)
BILIRUB SERPL-MCNC: 0.8 MG/DL — SIGNIFICANT CHANGE UP (ref 0.4–2)
BILIRUB UR-MCNC: NEGATIVE — SIGNIFICANT CHANGE UP
BUN SERPL-MCNC: 13.2 MG/DL — SIGNIFICANT CHANGE UP (ref 8–20)
CALCIUM SERPL-MCNC: 9.5 MG/DL — SIGNIFICANT CHANGE UP (ref 8.4–10.5)
CAST: 0 /LPF — SIGNIFICANT CHANGE UP (ref 0–4)
CHLORIDE SERPL-SCNC: 93 MMOL/L — LOW (ref 96–108)
CO2 SERPL-SCNC: 27 MMOL/L — SIGNIFICANT CHANGE UP (ref 22–29)
COLOR SPEC: YELLOW — SIGNIFICANT CHANGE UP
CREAT SERPL-MCNC: 0.86 MG/DL — SIGNIFICANT CHANGE UP (ref 0.5–1.3)
DIFF PNL FLD: NEGATIVE — SIGNIFICANT CHANGE UP
EGFR: 70 ML/MIN/1.73M2 — SIGNIFICANT CHANGE UP
EOSINOPHIL # BLD AUTO: 0.23 K/UL — SIGNIFICANT CHANGE UP (ref 0–0.5)
EOSINOPHIL NFR BLD AUTO: 2.5 % — SIGNIFICANT CHANGE UP (ref 0–6)
GLUCOSE SERPL-MCNC: 105 MG/DL — HIGH (ref 70–99)
GLUCOSE UR QL: NEGATIVE MG/DL — SIGNIFICANT CHANGE UP
HCT VFR BLD CALC: 36.3 % — SIGNIFICANT CHANGE UP (ref 34.5–45)
HGB BLD-MCNC: 12.3 G/DL — SIGNIFICANT CHANGE UP (ref 11.5–15.5)
IMM GRANULOCYTES NFR BLD AUTO: 0.4 % — SIGNIFICANT CHANGE UP (ref 0–0.9)
KETONES UR-MCNC: NEGATIVE MG/DL — SIGNIFICANT CHANGE UP
LEUKOCYTE ESTERASE UR-ACNC: NEGATIVE — SIGNIFICANT CHANGE UP
LYMPHOCYTES # BLD AUTO: 2 K/UL — SIGNIFICANT CHANGE UP (ref 1–3.3)
LYMPHOCYTES # BLD AUTO: 21.9 % — SIGNIFICANT CHANGE UP (ref 13–44)
MAGNESIUM SERPL-MCNC: 2.4 MG/DL — SIGNIFICANT CHANGE UP (ref 1.6–2.6)
MCHC RBC-ENTMCNC: 32.3 PG — SIGNIFICANT CHANGE UP (ref 27–34)
MCHC RBC-ENTMCNC: 33.9 GM/DL — SIGNIFICANT CHANGE UP (ref 32–36)
MCV RBC AUTO: 95.3 FL — SIGNIFICANT CHANGE UP (ref 80–100)
MONOCYTES # BLD AUTO: 0.78 K/UL — SIGNIFICANT CHANGE UP (ref 0–0.9)
MONOCYTES NFR BLD AUTO: 8.5 % — SIGNIFICANT CHANGE UP (ref 2–14)
NEUTROPHILS # BLD AUTO: 6 K/UL — SIGNIFICANT CHANGE UP (ref 1.8–7.4)
NEUTROPHILS NFR BLD AUTO: 65.7 % — SIGNIFICANT CHANGE UP (ref 43–77)
NITRITE UR-MCNC: NEGATIVE — SIGNIFICANT CHANGE UP
PH UR: 6.5 — SIGNIFICANT CHANGE UP (ref 5–8)
PHOSPHATE SERPL-MCNC: 4 MG/DL — SIGNIFICANT CHANGE UP (ref 2.4–4.7)
PLATELET # BLD AUTO: 214 K/UL — SIGNIFICANT CHANGE UP (ref 150–400)
POTASSIUM SERPL-MCNC: 3.9 MMOL/L — SIGNIFICANT CHANGE UP (ref 3.5–5.3)
POTASSIUM SERPL-SCNC: 3.9 MMOL/L — SIGNIFICANT CHANGE UP (ref 3.5–5.3)
PROT SERPL-MCNC: 7 G/DL — SIGNIFICANT CHANGE UP (ref 6.6–8.7)
PROT UR-MCNC: NEGATIVE MG/DL — SIGNIFICANT CHANGE UP
RAPID RVP RESULT: SIGNIFICANT CHANGE UP
RBC # BLD: 3.81 M/UL — SIGNIFICANT CHANGE UP (ref 3.8–5.2)
RBC # FLD: 12.2 % — SIGNIFICANT CHANGE UP (ref 10.3–14.5)
RBC CASTS # UR COMP ASSIST: 0 /HPF — SIGNIFICANT CHANGE UP (ref 0–4)
SARS-COV-2 RNA SPEC QL NAA+PROBE: SIGNIFICANT CHANGE UP
SODIUM SERPL-SCNC: 137 MMOL/L — SIGNIFICANT CHANGE UP (ref 135–145)
SP GR SPEC: 1.01 — SIGNIFICANT CHANGE UP (ref 1–1.03)
SQUAMOUS # UR AUTO: 0 /HPF — SIGNIFICANT CHANGE UP (ref 0–5)
TROPONIN T, HIGH SENSITIVITY RESULT: 12 NG/L — SIGNIFICANT CHANGE UP (ref 0–51)
TROPONIN T, HIGH SENSITIVITY RESULT: 12 NG/L — SIGNIFICANT CHANGE UP (ref 0–51)
UROBILINOGEN FLD QL: 1 MG/DL — SIGNIFICANT CHANGE UP (ref 0.2–1)
WBC # BLD: 9.14 K/UL — SIGNIFICANT CHANGE UP (ref 3.8–10.5)
WBC # FLD AUTO: 9.14 K/UL — SIGNIFICANT CHANGE UP (ref 3.8–10.5)
WBC UR QL: 0 /HPF — SIGNIFICANT CHANGE UP (ref 0–5)

## 2024-03-03 PROCEDURE — 71275 CT ANGIOGRAPHY CHEST: CPT | Mod: 26,MC

## 2024-03-03 PROCEDURE — 70450 CT HEAD/BRAIN W/O DYE: CPT | Mod: 26,MC

## 2024-03-03 PROCEDURE — 99223 1ST HOSP IP/OBS HIGH 75: CPT

## 2024-03-03 PROCEDURE — 93010 ELECTROCARDIOGRAM REPORT: CPT

## 2024-03-03 PROCEDURE — 99222 1ST HOSP IP/OBS MODERATE 55: CPT | Mod: 25

## 2024-03-03 PROCEDURE — 93306 TTE W/DOPPLER COMPLETE: CPT | Mod: 26

## 2024-03-03 RX ORDER — ACETAMINOPHEN 500 MG
2 TABLET ORAL
Qty: 0 | Refills: 0 | DISCHARGE

## 2024-03-03 RX ORDER — AMLODIPINE BESYLATE 2.5 MG/1
10 TABLET ORAL DAILY
Refills: 0 | Status: DISCONTINUED | OUTPATIENT
Start: 2024-03-03 | End: 2024-03-05

## 2024-03-03 RX ORDER — ROSUVASTATIN CALCIUM 5 MG/1
40 TABLET ORAL AT BEDTIME
Refills: 0 | Status: DISCONTINUED | OUTPATIENT
Start: 2024-03-03 | End: 2024-03-05

## 2024-03-03 RX ORDER — ASPIRIN/CALCIUM CARB/MAGNESIUM 324 MG
81 TABLET ORAL DAILY
Refills: 0 | Status: DISCONTINUED | OUTPATIENT
Start: 2024-03-03 | End: 2024-03-05

## 2024-03-03 RX ORDER — ASPIRIN/CALCIUM CARB/MAGNESIUM 324 MG
1 TABLET ORAL
Refills: 0 | DISCHARGE

## 2024-03-03 RX ORDER — VALSARTAN 80 MG/1
1 TABLET ORAL
Refills: 0 | DISCHARGE

## 2024-03-03 RX ORDER — CLOPIDOGREL BISULFATE 75 MG/1
1 TABLET, FILM COATED ORAL
Refills: 0 | DISCHARGE

## 2024-03-03 RX ORDER — ENOXAPARIN SODIUM 100 MG/ML
40 INJECTION SUBCUTANEOUS EVERY 24 HOURS
Refills: 0 | Status: DISCONTINUED | OUTPATIENT
Start: 2024-03-03 | End: 2024-03-05

## 2024-03-03 RX ORDER — PANTOPRAZOLE SODIUM 20 MG/1
40 TABLET, DELAYED RELEASE ORAL
Refills: 0 | Status: DISCONTINUED | OUTPATIENT
Start: 2024-03-03 | End: 2024-03-05

## 2024-03-03 RX ORDER — LANOLIN ALCOHOL/MO/W.PET/CERES
3 CREAM (GRAM) TOPICAL AT BEDTIME
Refills: 0 | Status: DISCONTINUED | OUTPATIENT
Start: 2024-03-03 | End: 2024-03-05

## 2024-03-03 RX ORDER — TIOTROPIUM BROMIDE AND OLODATEROL 3.124; 2.736 UG/1; UG/1
2 SPRAY, METERED RESPIRATORY (INHALATION) DAILY
Refills: 0 | Status: DISCONTINUED | OUTPATIENT
Start: 2024-03-03 | End: 2024-03-05

## 2024-03-03 RX ORDER — ONDANSETRON 8 MG/1
4 TABLET, FILM COATED ORAL EVERY 8 HOURS
Refills: 0 | Status: DISCONTINUED | OUTPATIENT
Start: 2024-03-03 | End: 2024-03-05

## 2024-03-03 RX ORDER — AMLODIPINE BESYLATE 2.5 MG/1
1 TABLET ORAL
Refills: 0 | DISCHARGE

## 2024-03-03 RX ORDER — VALSARTAN 80 MG/1
320 TABLET ORAL DAILY
Refills: 0 | Status: DISCONTINUED | OUTPATIENT
Start: 2024-03-03 | End: 2024-03-05

## 2024-03-03 RX ORDER — OMEPRAZOLE 10 MG/1
1 CAPSULE, DELAYED RELEASE ORAL
Refills: 0 | DISCHARGE

## 2024-03-03 RX ORDER — HYDROCHLOROTHIAZIDE 25 MG
1 TABLET ORAL
Refills: 0 | DISCHARGE

## 2024-03-03 RX ORDER — ROSUVASTATIN CALCIUM 5 MG/1
1 TABLET ORAL
Refills: 0 | DISCHARGE

## 2024-03-03 RX ORDER — CLOPIDOGREL BISULFATE 75 MG/1
75 TABLET, FILM COATED ORAL DAILY
Refills: 0 | Status: DISCONTINUED | OUTPATIENT
Start: 2024-03-03 | End: 2024-03-05

## 2024-03-03 RX ORDER — ALBUTEROL 90 UG/1
2 AEROSOL, METERED ORAL EVERY 6 HOURS
Refills: 0 | Status: DISCONTINUED | OUTPATIENT
Start: 2024-03-03 | End: 2024-03-05

## 2024-03-03 RX ORDER — ACETAMINOPHEN 500 MG
650 TABLET ORAL EVERY 6 HOURS
Refills: 0 | Status: DISCONTINUED | OUTPATIENT
Start: 2024-03-03 | End: 2024-03-05

## 2024-03-03 RX ADMIN — AMLODIPINE BESYLATE 10 MILLIGRAM(S): 2.5 TABLET ORAL at 06:20

## 2024-03-03 RX ADMIN — ROSUVASTATIN CALCIUM 40 MILLIGRAM(S): 5 TABLET ORAL at 22:09

## 2024-03-03 RX ADMIN — ENOXAPARIN SODIUM 40 MILLIGRAM(S): 100 INJECTION SUBCUTANEOUS at 22:10

## 2024-03-03 RX ADMIN — CLOPIDOGREL BISULFATE 75 MILLIGRAM(S): 75 TABLET, FILM COATED ORAL at 11:10

## 2024-03-03 RX ADMIN — Medication 81 MILLIGRAM(S): at 11:10

## 2024-03-03 RX ADMIN — PANTOPRAZOLE SODIUM 40 MILLIGRAM(S): 20 TABLET, DELAYED RELEASE ORAL at 06:20

## 2024-03-03 RX ADMIN — VALSARTAN 320 MILLIGRAM(S): 80 TABLET ORAL at 06:19

## 2024-03-03 NOTE — H&P ADULT - HISTORY OF PRESENT ILLNESS
78F with PMHX CAD s/p CABGx4, HTN, HLD, COPD who presents with chest pain. Pt states that last evening she started feeling left-sided, non-radiating chest pain. Pain has now improved but pt still feels mild discomfort in her left ribcage. She denies SOB or palpitations. Pt is visiting here from [place]. As per granddaughter, pt was recently hospitalized there for dizziness and was found bradycardic, her Metoprolol was discontinued and heart rate normalized. She gets occasional chest pains and follows with a cardiologist in [place] but is unsure if she ever had any stress test done. Daughter states CP occurs with SOB when patient climbs stairs to get to apartment. Labs and EKG reviewed. Cardio and Neuro reccs appreciated. Also noted to be mildly confused. Aneurysm  found on CT imaging. Pt seen/examined. No current complaints. CP/SOB resolved.     ROS negative unless mentioned.    PMHX: CAD s/p CABGx4 (2018), HTN, HLD, COPD  PSHX: CABG  FamHx: Denies fam hx HTN  Social Hx: Former Smoker quit 2018. Lives in [place].  Allergies: Latex

## 2024-03-03 NOTE — CONSULT NOTE ADULT - SUBJECTIVE AND OBJECTIVE BOX
[facility]                                              CARDIOLOGY AT [place] [address]                                             Telephone: [phone]. Fax:[phone]                                                       CARDIOLOGY CONSULTATION NOTE                                                                                             History obtained by: Patient and medical record  Community Cardiologist: in [place]   obtained: Yes [ x ] No [  ]pt wanted granddaughter to translate  Reason for Consultation: chest pain  Available out pt records reviewed: Yes [ x ] No [  ]    Chief complaint:  "My chest was hurting"    HPI:  This is 77 y/o female with hx of CAD s/p CABGx4 (2018), HTN, HLD, COPD who presents with chest pain. Pt states that last evening she started feeling left-sided, non-radiating chest pain. Pain has now improved but pt still feels mild discomfort in her left ribcage. She denies SOB or palpitations. Pt is visiting here from [place]. As per granddaughter, pt was recently hospitalized there for dizziness and was found bradycardic, her Metoprolol was discontinued and heart rate normalized. She gets occasional chest pains and follows with a cardiologist in [place] but is unsure if she ever had any stress test done. In the ED pt was found with elevated D-dimer 696, troponin 13->12, proBNP 74. EKG shows SR with subtle TWI in I and aVL (present in 2018). Echocardiogram in 2018 revealed normal LVEF.    CARDIAC TESTING   ECHO:  < from: TTE Echo Complete w/Doppler (18 @ 15:34) >    Summary:   1. Left ventricular ejection fraction, by visual estimation, is 70 to   75%.   2. Normal global left ventricular systolic function.   3. Spectral Doppler shows impaired relaxation pattern of left   ventricular myocardial filling (Grade I diastolic dysfunction).   4. Mild mitral annular calcification.   5. Trace tricuspid regurgitation.   6. Sclerotic aortic valve with normal opening.   7. Trace pulmonic valve regurgitation.    [id number] Desirae Combs MD, Electronically signed on 2018 at 7:36:14   PM    < end of copied text >      STRESS: n/a    CATH:   < from: Cardiac Cath Lab - Adult (18 @ 17:05) >  CORONARY VESSELS: The coronary circulation is right dominant.  LM:   --  LM: Normal. The vessel was normal sized, not calcified, and not  tortuous. Angiography showed minor luminal irregularities with no flow  limiting lesions.  LAD:   --  LAD: Normal. The vessel was normal sized, not calcified, and  excessively tortuous. Angiography showed severe atherosclerosis. There was  a tubular 90 % stenosis in the middle third of the vessel segment, just  after S1. The lesion was irregularly contoured, complex, eccentric, and  without evidence of thrombus. There was ROBERT grade 3 flow through the  vessel (brisk flow).  CX:   --  Circumflex: Normal. The vessel was normal sized and not  calcified. Angiography showed moderate atherosclerosis. There was a  diffuse 70 % stenosis in the middle third of the vessel segment. The  lesion was irregularly contoured and without evidence of thrombus. There  was ROBERT grade 3 flow through the vessel (brisk flow).  RCA:   --  RCA: Normal. The vessel was normal sized, not calcified, and  mildly tortuous. Angiography showed multiple discrete lesions. There was a  tubular 95 % stenosis in the proximal third of the vessel segment, just  before RV marginal 1. In a second lesion, there was a diffuse 100 %  stenosis in the middle third of the vessel segment, just after RV marginal  1. The lesion was irregularly contoured and without evidence of thrombus.  There was ROBERT grade 0 flow through the vessel (no flow) and good  collateral blood supply to the distal myocardium. This lesion is not a  chronic total occlusion.  COMPLICATIONS: There were no complications. No complications occurred  during the cath lab visit.  DIAGNOSTIC IMPRESSIONS: There is significant triple vessel coronary artery  disease.  Mid LAD=90%  Mid LCx=70%  Prox RCA=95%  Mid KPR=230% (Fills via collaterals from the Left system) Left ventricular  function is normal.  LVEF=70%  DIAGNOSTIC RECOMMENDATIONS: The patient should continue with the present  medications. A consultation with CT Surgery be obtained.  Prepared and signed by  Pedro Perez MD  Signed 2018 15:34:34    < end of copied text >      ELECTROPHYSIOLOGY: n/a    PAST MEDICAL HISTORY    HTN (hypertension)    Coronary artery disease involving native coronary artery of native heart, angina presence unspecified    NSTEMI (non-ST elevated myocardial infarction)    S/P CABG x 4    Smoker        PAST SURGICAL HISTORY  No significant past surgical history    S/P CABG x 4        SOCIAL HISTORY:  lives with grandson in [place]  CIGARETTES:   former smoker, quit in 2018  ALCOHOL: denies  DRUGS: denies    FAMILY HISTORY:  No pertinent family history in first degree relatives      Family History of Cardiovascular Disease:  Yes [  ] No [  ]  Coronary Artery Disease in first degree relative: Yes [  ] No [  ]  Sudden Cardiac Death in First degree relative: Yes [  ] No [  ]    HOME MEDICATIONS:   mg  Plavix 75 mg  Amlodipine 10 mg  Vlsartan 320 mg  Hctz 25 mg  Crestor 40 mg  Omeprazole 20 mg  Inhalers    ALLERGIES:   latex (Unknown)  OHS PT (Unknown)  No Known Drug Allergies      REVIEW OF SYMPTOMS:   CONSTITUTIONAL: No fever, no chills, no weight loss, no weight gain, no fatigue   ENMT:  No vertigo; No sinus or throat pain  NECK: No pain or stiffness  CARDIOVASCULAR: as per HPI  RESPIRATORY: no Shortness of breath, no cough, no wheezing  : No dysuria, no hematuria   GI: No dark color stool, no nausea, no diarrhea, no constipation, no abdominal pain   NEURO: No headache, no slurred speech   MUSCULOSKELETAL: No joint pain or swelling; No muscle, back, or extremity pain  PSYCH: No agitation, no anxiety.    ALL OTHER REVIEW OF SYSTEMS ARE NEGATIVE.    VITAL SIGNS:  T(C): 36.7 (24 @ 23:55), Max: 36.7 (24 @ 23:55)  T(F): 98.1 (24 @ 23:55), Max: 98.1 (24 @ 23:55)  HR: 65 (24 @ 23:55) (65 - 81)  BP: 120/62 (24 @ 23:55) (120/62 - 133/63)  RR: 18 (24 @ 23:55) (18 - 18)  SpO2: 98% (24 @ 23:55) (98% - 98%)    INTAKE AND OUTPUT:       PHYSICAL EXAM:  Constitutional: Comfortable . No acute distress.   HEENT: Atraumatic and normocephalic , neck is supple . no JVD. No carotid bruit.  CNS: A&Ox3. No focal deficits.   Respiratory: CTAB, unlabored   Cardiovascular: RRR normal s1 s2. No murmurs  Gastrointestinal: Soft, non-tender. +Bowel sounds.   Extremities: 2+ Peripheral Pulses, No clubbing, cyanosis, or edema  Psychiatric: Calm . no agitation.   Skin: Warm and dry, no ulcers on extremities     LABS:                            11.6   8.19  )-----------( 220      ( 02 Mar 2024 20:40 )             35.2     03-    136  |  97  |  17.5  ----------------------------<  189<H>  3.6   |  26.0  |  0.82    Ca    9.0      02 Mar 2024 20:40  Mg     2.2     03-    TPro  7.1  /  Alb  4.2  /  TBili  0.6  /  DBili  x   /  AST  17  /  ALT  14  /  AlkPhos  99  03-    PT/INR - ( 02 Mar 2024 21:42 )   PT: 11.1 sec;   INR: 1.00 ratio         PTT - ( 02 Mar 2024 21:42 )  PTT:33.8 sec  Urinalysis Basic - ( 02 Mar 2024 22:45 )    Color: Yellow / Appearance: Clear / S.006 / pH: x  Gluc: x / Ketone: Negative mg/dL  / Bili: Negative / Urobili: 1.0 mg/dL   Blood: x / Protein: Negative mg/dL / Nitrite: Negative   Leuk Esterase: Negative / RBC: 0 /HPF / WBC 0 /HPF   Sq Epi: x / Non Sq Epi: 0 /HPF / Bacteria: Negative /HPF      INTERPRETATION OF TELEMETRY: SR 60's, no events    ECG: SR 77 bpm, TWI in I and aVL  Prior ECG: Yes [ x ] No [  ]    RADIOLOGY & ADDITIONAL STUDIES:    X-ray:    CT scan:   MRI:   US:  < from: US Duplex Venous Lower Ext Complete, Bilateral (24 @ 22:31) >  IMPRESSION:  No evidence of deep venous thrombosis in either lower extremity.      --- End of Report ---      MAURY CHAN MD; Attending Radiologist  This document has been electronically signed. Mar  2 2024 11:23PM    < end of copied text >  
                             [facility]                                     Neurology at [place]                                 [facility]                                  [address]                                        [place], [place], [address]                                             [phone]    CC: confusion  HPI:   The patient is a 78y Female who presented with chest pain and episode of confusion.  Her granddaughter is present and helps with history and translation.  The patient is visiting from [place] and had episode where she was speaking to her granddaughter about an event that had happened in the past, but to her granddaughter it seemed that she was living this experience out in the present.  The patient is now at her baseline.  There may be some mild confusion at home in [place] but she has not been diagnosed with dementia.  She has bradycardia and cardiac meds are being adjusted. 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  Neurology is asked to assess    PAST MEDICAL & SURGICAL HISTORY:  HTN (hypertension)      Coronary artery disease involving native coronary artery of native heart, angina presence unspecified      NSTEMI (non-ST elevated myocardial infarction)      S/P CABG x 4      Smoker  quit on 7/6 admission      S/P CABG x 4      MEDICATIONS  (STANDING):  amLODIPine   Tablet 10 milliGRAM(s) Oral daily  aspirin  chewable 81 milliGRAM(s) Oral daily  clopidogrel Tablet 75 milliGRAM(s) Oral daily  enoxaparin Injectable 40 milliGRAM(s) SubCutaneous every 24 hours  hydrochlorothiazide 25 milliGRAM(s) Oral daily  pantoprazole    Tablet 40 milliGRAM(s) Oral before breakfast  rosuvastatin 40 milliGRAM(s) Oral at bedtime  tiotropium 2.5 MICROgram(s)/olodaterol 2.5 MICROgram(s) (STIOLTO) Inhaler 2 Puff(s) Inhalation daily  valsartan 320 milliGRAM(s) Oral daily    MEDICATIONS  (PRN):  acetaminophen     Tablet .. 650 milliGRAM(s) Oral every 6 hours PRN Temp greater or equal to 38C (100.4F), Mild Pain (1 - 3)  albuterol    90 MICROgram(s) HFA Inhaler 2 Puff(s) Inhalation every 6 hours PRN for bronchospasm  aluminum hydroxide/magnesium hydroxide/simethicone Suspension 30 milliLiter(s) Oral every 4 hours PRN Dyspepsia  melatonin 3 milliGRAM(s) Oral at bedtime PRN Insomnia  ondansetron Injectable 4 milliGRAM(s) IV Push every 8 hours PRN Nausea and/or Vomiting      Allergies    latex (Unknown)  No Known Drug Allergies    Intolerances    OHS PT (Unknown)      SOCIAL HISTORY:  former tob,   rare alcohol   no drugs    FAMILY HISTORY:  No pertinent family history in first degree relatives      ROS: 14 point ROS negative other than what is present in HPI or below    Vital Signs Last 24 Hrs  T(C): 36.8 (03 Mar 2024 08:30), Max: 36.8 (03 Mar 2024 04:34)  T(F): 98.3 (03 Mar 2024 08:30), Max: 98.3 (03 Mar 2024 08:30)  HR: 65 (03 Mar 2024 08:30) (64 - 81)  BP: 136/71 (03 Mar 2024 08:30) (120/62 - 136/71)  BP(mean): --  RR: 20 (03 Mar 2024 08:30) (18 - 20)  SpO2: 99% (03 Mar 2024 08:30) (98% - 99%)    Parameters below as of 03 Mar 2024 08:30  Patient On (Oxygen Delivery Method): room air      General: NAD    Detailed Neurologic Exam:    Mental status: The patient is awake and alert and has normal attention span.  The patient is oriented to self, clinic and March 1968-something.  The patient is able to name objects, follow commands, repeat sentences.    Cranial nerves: Pupils equal and react symmetrically to light. There is no visual field deficit to confrontation. Extraocular motion is full with no nystagmus. There is no ptosis. Facial sensation is intact. Facial musculature is symmetric. Palate elevates symmetrically. Tongue is midline.    Motor: There is normal bulk and tone.  There is no tremor.  Strength is 5/5 in the right arm and leg.   Strength is 5/5 in the left arm and leg.    Sensation: Intact to light touch and pin in 4 extremities    Reflexes: 1+ throughout and plantar responses are flexor.    Cerebellar: There is no dysmetria on finger to nose testing.    Gait : deferred    LABS:                         11.6   8.19  )-----------( 220      ( 02 Mar 2024 20:40 )             35.2       03-02    136  |  97  |  17.5  ----------------------------<  189<H>  3.6   |  26.0  |  0.82    Ca    9.0      02 Mar 2024 20:40  Mg     2.2     03-02    TPro  7.1  /  Alb  4.2  /  TBili  0.6  /  DBili  x   /  AST  17  /  ALT  14  /  AlkPhos  99  03-02      PT/INR - ( 02 Mar 2024 21:42 )   PT: 11.1 sec;   INR: 1.00 ratio         PTT - ( 02 Mar 2024 21:42 )  PTT:33.8 sec    Urinalysis + Microscopic Examination (03.02.24 @ 22:45)   pH Urine: 6.5  Urine Appearance: Clear  Color: Yellow  Specific Gravity: 1.006  Protein, Urine: Negative mg/dL  Glucose Qualitative, Urine: Negative mg/dL  Ketone - Urine: Negative mg/dL  Blood, Urine: Negative  Bilirubin: Negative  Urobilinogen: 1.0 mg/dL  Leukocyte Esterase Concentration: Negative  Nitrite: Negative  White Blood Cell - Urine: 0 /HPF  Red Blood Cell - Urine: 0 /HPF  Bacteria: Negative /HPF  Cast: 0 /LPF  Epithelial Cells: 0 /HPF  RADIOLOGY & ADDITIONAL STUDIES (independently reviewed unless otherwise noted):  CT Head No Cont (03.03.24 @ 00:54)   IMPRESSION:  No acute intracranial hemorrhage or mass effect.  A 7 mm basilar tip aneurysm, recommend further evaluation by CTA brain.

## 2024-03-03 NOTE — CONSULT NOTE ADULT - ASSESSMENT
This is 79 y/o female with hx of CAD s/p CABGx4 (2018), HTN, HLD, COPD who presents with chest pain. Pt states that last evening she started feeling left-sided, non-radiating chest pain. Pain has now improved but pt still feels mild discomfort in her left ribcage. She denies SOB or palpitations. Pt is visiting here from [place]. As per granddaughter, pt was recently hospitalized there for dizziness and was found bradycardic, her Metoprolol was discontinued and heart rate normalized. She gets occasional chest pains and follows with a cardiologist in [place] but is unsure if she ever had any stress test done. In the ED pt was found with elevated D-dimer 696, troponin 13->12, proBNP 74. EKG shows SR with subtle TWI in I and aVL (present in 2018). Echocardiogram in 2018 revealed normal LVEF.
The patient is a 78y Female who is followed by neurology because of AMS/confusion.  Also there was an incidental finding of 7 mm basilar tip aneurysm on head CT    AMS  suspect mild cognitive impairment, but difficult to determine while hospitalized   - she has occasional episodes of repeating stories   - she had episode of confusing a past event as a present occurrence   - she was not sure of the year  Does not appear to be toxic-metabolic  Would pursue outpatient work up for this    Aneurysm  Incidental 7mm basilar tip aneurysm   MRA head ordered  If MRA is unable to be done she will need a CTA head with contrast to further define aneurysm  Neuro IR consult    will follow with you    Erickson Jackson MD PhD   [id number]

## 2024-03-03 NOTE — H&P ADULT - NSHPLABSRESULTS_GEN_ALL_CORE
LE Venous Dopplers IMPRESSION:  No evidence of deep venous thrombosis in either lower extremity.    CTH WO IMPRESSION:  No acute intracranial hemorrhage or mass effect.  A 7 mm basilar tip aneurysm, recommend further evaluation by CTA brain.    CTA Chest IVC IMPRESSION:  No pulmonary embolism.    EKG no RAMESH consistent with ischemia though wavy baseline, there are twi in inferior and lateral leads that are similar to prior ecg read pending

## 2024-03-03 NOTE — PATIENT PROFILE ADULT - PRO INTERPRETER NEED 2
Onset: Insect Bite     Location / description:  Today 06/16/2019 patient noticed one welt on her right butt cheek and one on her left thigh. Welts are 1/2 dollar size, res and itchy. Patient reports her mother was seen recently for the same welts and was told that they were insect bites., mom was placed on an ABT. Patient reports she is leaving the country and is concerned. Patient was seeking treatment over the phone. Patient denies fever or signs of infection. Patient denies breathing issues.     Recommended UC for evaluation.    Precipitating Factors: see above     Pain Scale (1 - 10), 10 highest: 0/10    Associated Symptoms: see above    What improves/worsens symptoms: Patient tried her triamcinolone (ARISTOCORT) 0.1 % cream that she uses for eczema, and it did not help.      Symptom specific medications: none    LMP : No LMP recorded.   Are you pregnant or breast feeding:     Recent Care:   03/21/2019 DERM     [facility] Protocols and Care Advice reviewed.     Asha verbalizes understanding. Instructed to call again with any changes in status or with new questions.    Signed by Juvencio Garcia RN  ([place])  Under supervision of Krysten Merida RN (Preceptor)        Reason for Disposition  • Itchy insect bite (all triage questions negative)     Disposition: Go to  now, due to patient leaving country today.    Protocols used: INSECT BITE-A-       no rashes,  no suspicious lesions,  no areas of discoloration,  no jaundice present,  good turgor,  no abnormalities, no masses,  no tenderness on palpation [place]

## 2024-03-03 NOTE — H&P ADULT - TIME BILLING
Labs/Imaging/Notes reviewed. Cardio consulted appreciate reccs. Neuro consulted appreciate reccs. Orders placed. Meds from [place] confirmed.

## 2024-03-03 NOTE — CONSULT NOTE ADULT - PROBLEM SELECTOR RECOMMENDATION 9
-telemetry monitoring  -symptoms are now improved  -cardiac markers x3  -TTE  -pharmacologic nuclear stress test  -b/l LE US negative for DVT, chest CTA pending  -continue ASA/Plavix/Crestor

## 2024-03-03 NOTE — H&P ADULT - ASSESSMENT
ASSESSMENT:  78F with PMHX CAD s/p CABGx4, HTN, HLD, COPD who presents admitted for Chest Pain r/o ACS and AMS found to have cerebral aneurysm.    PLAN:  Chest Pain  -Admit to Tele  -Repeat Labs  -Trend Cardiac Enzymes -> Negative x2  -D-Dimer Elevated   -LE Venous Dopplers Negative  -CTA Chest negative for PE  -Repeat EKG in AM  -TTE pending  -NST in AM  -NPO PMN  -Cardiology Consulted ([place])    AMS, Brain Aneurysm  -CTH WO +7 mm basilar tip aneurysm  -Labs reviewed WNL  -VBG Ok  -Check RVP for confusion  -MRA Head/Neck IVC pending  -Neuro Consulted spoke with ED recc MRA    CAD s/p CABG, HTN, HLD  -Continue DAPT ASA 81 (100mg)/Plavix 75mg q24  -Amlodipine 10mg q24  -HCTZ 25mg q24  -Valsartan 320mg q24  -BB dc'd due to bradycardia   -Crestor 40mg q24    COPD  -Albuterol/Stiolto    GERD  -Omeprazole 20mg q24

## 2024-03-03 NOTE — ED ADULT NURSE REASSESSMENT NOTE - NS ED NURSE REASSESS COMMENT FT1
Assumed care of pt from previous RN. Pt awake, NAD. Breathing even and unlabored. Offering no complaints at this time. Family at bedside. Safety maintained.

## 2024-03-03 NOTE — H&P ADULT - NSHPPHYSICALEXAM_GEN_ALL_CORE
Vital Signs Last 24 Hrs  T(C): 36.8 (03 Mar 2024 04:34), Max: 36.8 (03 Mar 2024 04:34)  T(F): 98.2 (03 Mar 2024 04:34), Max: 98.2 (03 Mar 2024 04:34)  HR: 64 (03 Mar 2024 04:34) (64 - 81)  BP: 120/73 (03 Mar 2024 04:34) (120/62 - 133/63)  BP(mean): --  RR: 18 (03 Mar 2024 04:34) (18 - 18)  SpO2: 98% (03 Mar 2024 04:34) (98% - 98%)    Parameters below as of 03 Mar 2024 04:34  Patient On (Oxygen Delivery Method): room air    Constitutional: Well-appearing, NAD, VSS  Head: NC/AT  Eyes: PERRL, EOMI, anicteric sclera, conjunctiva WNL  ENT: Normal Pharynx, MMM, No tonsillar exudate/erythema  Neck: Supple, Non-tender  Chest: Non-tender, no rashes  Cardio: RRR, s1/s2, no appreciable murmurs/rubs/gallops  Resp: BS CTA bilaterally, no wheezing/rhonchi/rales  Abd: Soft, Non-tender, Non-distended, no rebound/guarding/rigidity  : not examined  Rectal: not examined  MSK: moving all extremities, no motor weakness, full ROM x4  Ext: palpable distal pulses, good capillary refill  Psych: appropriate, cooperative  Neuro: CN II-XII grossly intact, no focal deficits  Skin: Warm/Dry. No rashes.

## 2024-03-03 NOTE — CONSULT NOTE ADULT - NS ATTEND AMEND GEN_ALL_CORE FT
Patient seen and examined by me.    T(C): 36.7 (03-03-24 @ 15:33), Max: 36.8 (03-03-24 @ 04:34)  HR: 65 (03-03-24 @ 15:33) (64 - 81)  BP: 111/66 (03-03-24 @ 15:33) (111/66 - 136/71)  RR: 18 (03-03-24 @ 15:33) (18 - 20)  SpO2: 94% (03-03-24 @ 15:33) (94% - 99%)  Patient alert and awake.  Chest- Bilateral Clear BS  Cardiac- S1 and S2  Abdomen- Soft    Assessment/Plan:  1. Chest pain  ECHO is OK    For nuc stress test on 3/4/2024  I have discussed my recommendation with the PA which are outlined above.  Will follow.

## 2024-03-03 NOTE — PATIENT PROFILE ADULT - FALL HARM RISK - RISK INTERVENTIONS

## 2024-03-04 ENCOUNTER — RESULT REVIEW (OUTPATIENT)
Age: 79
End: 2024-03-04

## 2024-03-04 LAB
ALBUMIN SERPL ELPH-MCNC: 4 G/DL — SIGNIFICANT CHANGE UP (ref 3.3–5.2)
ALP SERPL-CCNC: 99 U/L — SIGNIFICANT CHANGE UP (ref 40–120)
ALT FLD-CCNC: 13 U/L — SIGNIFICANT CHANGE UP
ANION GAP SERPL CALC-SCNC: 15 MMOL/L — SIGNIFICANT CHANGE UP (ref 5–17)
AST SERPL-CCNC: 18 U/L — SIGNIFICANT CHANGE UP
BASOPHILS # BLD AUTO: 0.08 K/UL — SIGNIFICANT CHANGE UP (ref 0–0.2)
BASOPHILS NFR BLD AUTO: 0.9 % — SIGNIFICANT CHANGE UP (ref 0–2)
BILIRUB SERPL-MCNC: 0.7 MG/DL — SIGNIFICANT CHANGE UP (ref 0.4–2)
BUN SERPL-MCNC: 15.4 MG/DL — SIGNIFICANT CHANGE UP (ref 8–20)
CALCIUM SERPL-MCNC: 8.7 MG/DL — SIGNIFICANT CHANGE UP (ref 8.4–10.5)
CHLORIDE SERPL-SCNC: 94 MMOL/L — LOW (ref 96–108)
CO2 SERPL-SCNC: 27 MMOL/L — SIGNIFICANT CHANGE UP (ref 22–29)
CREAT SERPL-MCNC: 1.06 MG/DL — SIGNIFICANT CHANGE UP (ref 0.5–1.3)
CULTURE RESULTS: SIGNIFICANT CHANGE UP
EGFR: 54 ML/MIN/1.73M2 — LOW
EOSINOPHIL # BLD AUTO: 0.33 K/UL — SIGNIFICANT CHANGE UP (ref 0–0.5)
EOSINOPHIL NFR BLD AUTO: 3.9 % — SIGNIFICANT CHANGE UP (ref 0–6)
GLUCOSE SERPL-MCNC: 132 MG/DL — HIGH (ref 70–99)
HCT VFR BLD CALC: 33.9 % — LOW (ref 34.5–45)
HCV AB S/CO SERPL IA: 0.1 S/CO — SIGNIFICANT CHANGE UP (ref 0–0.99)
HCV AB SERPL-IMP: SIGNIFICANT CHANGE UP
HGB BLD-MCNC: 11.4 G/DL — LOW (ref 11.5–15.5)
IMM GRANULOCYTES NFR BLD AUTO: 0.2 % — SIGNIFICANT CHANGE UP (ref 0–0.9)
LYMPHOCYTES # BLD AUTO: 2.53 K/UL — SIGNIFICANT CHANGE UP (ref 1–3.3)
LYMPHOCYTES # BLD AUTO: 29.6 % — SIGNIFICANT CHANGE UP (ref 13–44)
MCHC RBC-ENTMCNC: 32 PG — SIGNIFICANT CHANGE UP (ref 27–34)
MCHC RBC-ENTMCNC: 33.6 GM/DL — SIGNIFICANT CHANGE UP (ref 32–36)
MCV RBC AUTO: 95.2 FL — SIGNIFICANT CHANGE UP (ref 80–100)
MONOCYTES # BLD AUTO: 0.77 K/UL — SIGNIFICANT CHANGE UP (ref 0–0.9)
MONOCYTES NFR BLD AUTO: 9 % — SIGNIFICANT CHANGE UP (ref 2–14)
NEUTROPHILS # BLD AUTO: 4.82 K/UL — SIGNIFICANT CHANGE UP (ref 1.8–7.4)
NEUTROPHILS NFR BLD AUTO: 56.4 % — SIGNIFICANT CHANGE UP (ref 43–77)
PLATELET # BLD AUTO: 205 K/UL — SIGNIFICANT CHANGE UP (ref 150–400)
POTASSIUM SERPL-MCNC: 3.5 MMOL/L — SIGNIFICANT CHANGE UP (ref 3.5–5.3)
POTASSIUM SERPL-SCNC: 3.5 MMOL/L — SIGNIFICANT CHANGE UP (ref 3.5–5.3)
PROT SERPL-MCNC: 6.8 G/DL — SIGNIFICANT CHANGE UP (ref 6.6–8.7)
RBC # BLD: 3.56 M/UL — LOW (ref 3.8–5.2)
RBC # FLD: 12 % — SIGNIFICANT CHANGE UP (ref 10.3–14.5)
SODIUM SERPL-SCNC: 136 MMOL/L — SIGNIFICANT CHANGE UP (ref 135–145)
SPECIMEN SOURCE: SIGNIFICANT CHANGE UP
WBC # BLD: 8.55 K/UL — SIGNIFICANT CHANGE UP (ref 3.8–10.5)
WBC # FLD AUTO: 8.55 K/UL — SIGNIFICANT CHANGE UP (ref 3.8–10.5)

## 2024-03-04 PROCEDURE — 93018 CV STRESS TEST I&R ONLY: CPT

## 2024-03-04 PROCEDURE — 99232 SBSQ HOSP IP/OBS MODERATE 35: CPT

## 2024-03-04 PROCEDURE — 78452 HT MUSCLE IMAGE SPECT MULT: CPT | Mod: 26

## 2024-03-04 PROCEDURE — 99233 SBSQ HOSP IP/OBS HIGH 50: CPT

## 2024-03-04 PROCEDURE — 93016 CV STRESS TEST SUPVJ ONLY: CPT

## 2024-03-04 PROCEDURE — 70548 MR ANGIOGRAPHY NECK W/DYE: CPT | Mod: 26

## 2024-03-04 PROCEDURE — 70545 MR ANGIOGRAPHY HEAD W/DYE: CPT | Mod: 26

## 2024-03-04 RX ADMIN — ENOXAPARIN SODIUM 40 MILLIGRAM(S): 100 INJECTION SUBCUTANEOUS at 21:55

## 2024-03-04 RX ADMIN — VALSARTAN 320 MILLIGRAM(S): 80 TABLET ORAL at 12:57

## 2024-03-04 RX ADMIN — AMLODIPINE BESYLATE 10 MILLIGRAM(S): 2.5 TABLET ORAL at 12:57

## 2024-03-04 RX ADMIN — ROSUVASTATIN CALCIUM 40 MILLIGRAM(S): 5 TABLET ORAL at 21:55

## 2024-03-04 RX ADMIN — CLOPIDOGREL BISULFATE 75 MILLIGRAM(S): 75 TABLET, FILM COATED ORAL at 12:57

## 2024-03-04 NOTE — PROGRESS NOTE ADULT - NS ATTEND AMEND GEN_ALL_CORE FT
Normal stress test.   No further in-patient cardiac work-up/management is needed.  Follow-up in cardiology office in 2 weeks.

## 2024-03-04 NOTE — PROGRESS NOTE ADULT - PROBLEM SELECTOR PLAN 1
.  - NSR 1stdegree AV block   - [place] 13->12->12  - TTE 55-60%, G1DD, PASP 22mmHg  - NST without inducible ischemia or infarction  - BB d/c in [place] due to bradycardia. Per family, patient was advised to have a PPM, however no indication for PPM at this time  - Continue ASA, Plavix, Crestor  - continue Amlodipine 10 mg/ Valsartan 320 mg and Hctz 25 mg.    No further inpatient cardiac work up at this time. Patient should follow up with primary cardiologist in [place] when she returns.  Will sign off.  Please reconsult if needed.

## 2024-03-05 ENCOUNTER — TRANSCRIPTION ENCOUNTER (OUTPATIENT)
Age: 79
End: 2024-03-05

## 2024-03-05 VITALS
OXYGEN SATURATION: 96 % | HEART RATE: 62 BPM | TEMPERATURE: 98 F | RESPIRATION RATE: 18 BRPM | DIASTOLIC BLOOD PRESSURE: 72 MMHG | SYSTOLIC BLOOD PRESSURE: 116 MMHG

## 2024-03-05 LAB
A1C WITH ESTIMATED AVERAGE GLUCOSE RESULT: 5.2 % — SIGNIFICANT CHANGE UP (ref 4–5.6)
ALBUMIN SERPL ELPH-MCNC: 4 G/DL — SIGNIFICANT CHANGE UP (ref 3.3–5.2)
ALP SERPL-CCNC: 101 U/L — SIGNIFICANT CHANGE UP (ref 40–120)
ALT FLD-CCNC: 12 U/L — SIGNIFICANT CHANGE UP
ANION GAP SERPL CALC-SCNC: 16 MMOL/L — SIGNIFICANT CHANGE UP (ref 5–17)
AST SERPL-CCNC: 14 U/L — SIGNIFICANT CHANGE UP
BASOPHILS # BLD AUTO: 0.1 K/UL — SIGNIFICANT CHANGE UP (ref 0–0.2)
BASOPHILS NFR BLD AUTO: 1.2 % — SIGNIFICANT CHANGE UP (ref 0–2)
BILIRUB SERPL-MCNC: 0.8 MG/DL — SIGNIFICANT CHANGE UP (ref 0.4–2)
BUN SERPL-MCNC: 15.8 MG/DL — SIGNIFICANT CHANGE UP (ref 8–20)
CALCIUM SERPL-MCNC: 9.1 MG/DL — SIGNIFICANT CHANGE UP (ref 8.4–10.5)
CHLORIDE SERPL-SCNC: 95 MMOL/L — LOW (ref 96–108)
CHOLEST SERPL-MCNC: 119 MG/DL — SIGNIFICANT CHANGE UP
CO2 SERPL-SCNC: 27 MMOL/L — SIGNIFICANT CHANGE UP (ref 22–29)
CREAT SERPL-MCNC: 1.06 MG/DL — SIGNIFICANT CHANGE UP (ref 0.5–1.3)
EGFR: 54 ML/MIN/1.73M2 — LOW
EOSINOPHIL # BLD AUTO: 0.36 K/UL — SIGNIFICANT CHANGE UP (ref 0–0.5)
EOSINOPHIL NFR BLD AUTO: 4.4 % — SIGNIFICANT CHANGE UP (ref 0–6)
ESTIMATED AVERAGE GLUCOSE: 103 MG/DL — SIGNIFICANT CHANGE UP (ref 68–114)
GLUCOSE SERPL-MCNC: 132 MG/DL — HIGH (ref 70–99)
HCT VFR BLD CALC: 35.8 % — SIGNIFICANT CHANGE UP (ref 34.5–45)
HDLC SERPL-MCNC: 47 MG/DL — LOW
HGB BLD-MCNC: 11.8 G/DL — SIGNIFICANT CHANGE UP (ref 11.5–15.5)
IMM GRANULOCYTES NFR BLD AUTO: 0.4 % — SIGNIFICANT CHANGE UP (ref 0–0.9)
LIPID PNL WITH DIRECT LDL SERPL: 37 MG/DL — SIGNIFICANT CHANGE UP
LYMPHOCYTES # BLD AUTO: 2.12 K/UL — SIGNIFICANT CHANGE UP (ref 1–3.3)
LYMPHOCYTES # BLD AUTO: 25.8 % — SIGNIFICANT CHANGE UP (ref 13–44)
MAGNESIUM SERPL-MCNC: 2.4 MG/DL — SIGNIFICANT CHANGE UP (ref 1.6–2.6)
MCHC RBC-ENTMCNC: 31.3 PG — SIGNIFICANT CHANGE UP (ref 27–34)
MCHC RBC-ENTMCNC: 33 GM/DL — SIGNIFICANT CHANGE UP (ref 32–36)
MCV RBC AUTO: 95 FL — SIGNIFICANT CHANGE UP (ref 80–100)
MONOCYTES # BLD AUTO: 0.72 K/UL — SIGNIFICANT CHANGE UP (ref 0–0.9)
MONOCYTES NFR BLD AUTO: 8.7 % — SIGNIFICANT CHANGE UP (ref 2–14)
NEUTROPHILS # BLD AUTO: 4.9 K/UL — SIGNIFICANT CHANGE UP (ref 1.8–7.4)
NEUTROPHILS NFR BLD AUTO: 59.5 % — SIGNIFICANT CHANGE UP (ref 43–77)
NON HDL CHOLESTEROL: 72 MG/DL — SIGNIFICANT CHANGE UP
PLATELET # BLD AUTO: 231 K/UL — SIGNIFICANT CHANGE UP (ref 150–400)
POTASSIUM SERPL-MCNC: 3.3 MMOL/L — LOW (ref 3.5–5.3)
POTASSIUM SERPL-SCNC: 3.3 MMOL/L — LOW (ref 3.5–5.3)
PROT SERPL-MCNC: 6.8 G/DL — SIGNIFICANT CHANGE UP (ref 6.6–8.7)
RBC # BLD: 3.77 M/UL — LOW (ref 3.8–5.2)
RBC # FLD: 12 % — SIGNIFICANT CHANGE UP (ref 10.3–14.5)
SODIUM SERPL-SCNC: 138 MMOL/L — SIGNIFICANT CHANGE UP (ref 135–145)
TRIGL SERPL-MCNC: 176 MG/DL — HIGH
TSH SERPL-MCNC: 4.72 UIU/ML — HIGH (ref 0.27–4.2)
VIT B12 SERPL-MCNC: 623 PG/ML — SIGNIFICANT CHANGE UP (ref 232–1245)
WBC # BLD: 8.23 K/UL — SIGNIFICANT CHANGE UP (ref 3.8–10.5)
WBC # FLD AUTO: 8.23 K/UL — SIGNIFICANT CHANGE UP (ref 3.8–10.5)

## 2024-03-05 PROCEDURE — 85025 COMPLETE CBC W/AUTO DIFF WBC: CPT

## 2024-03-05 PROCEDURE — 85610 PROTHROMBIN TIME: CPT

## 2024-03-05 PROCEDURE — 80053 COMPREHEN METABOLIC PANEL: CPT

## 2024-03-05 PROCEDURE — 83735 ASSAY OF MAGNESIUM: CPT

## 2024-03-05 PROCEDURE — 87086 URINE CULTURE/COLONY COUNT: CPT

## 2024-03-05 PROCEDURE — 93970 EXTREMITY STUDY: CPT

## 2024-03-05 PROCEDURE — 70545 MR ANGIOGRAPHY HEAD W/DYE: CPT | Mod: MC

## 2024-03-05 PROCEDURE — 36415 COLL VENOUS BLD VENIPUNCTURE: CPT

## 2024-03-05 PROCEDURE — 82962 GLUCOSE BLOOD TEST: CPT

## 2024-03-05 PROCEDURE — 85379 FIBRIN DEGRADATION QUANT: CPT

## 2024-03-05 PROCEDURE — 85730 THROMBOPLASTIN TIME PARTIAL: CPT

## 2024-03-05 PROCEDURE — 83036 HEMOGLOBIN GLYCOSYLATED A1C: CPT

## 2024-03-05 PROCEDURE — 80061 LIPID PANEL: CPT

## 2024-03-05 PROCEDURE — A9500: CPT

## 2024-03-05 PROCEDURE — 94640 AIRWAY INHALATION TREATMENT: CPT

## 2024-03-05 PROCEDURE — 93005 ELECTROCARDIOGRAM TRACING: CPT

## 2024-03-05 PROCEDURE — 81001 URINALYSIS AUTO W/SCOPE: CPT

## 2024-03-05 PROCEDURE — 84100 ASSAY OF PHOSPHORUS: CPT

## 2024-03-05 PROCEDURE — 71275 CT ANGIOGRAPHY CHEST: CPT | Mod: MC

## 2024-03-05 PROCEDURE — 83690 ASSAY OF LIPASE: CPT

## 2024-03-05 PROCEDURE — 0225U NFCT DS DNA&RNA 21 SARSCOV2: CPT

## 2024-03-05 PROCEDURE — 84443 ASSAY THYROID STIM HORMONE: CPT

## 2024-03-05 PROCEDURE — 82607 VITAMIN B-12: CPT

## 2024-03-05 PROCEDURE — 83880 ASSAY OF NATRIURETIC PEPTIDE: CPT

## 2024-03-05 PROCEDURE — 86803 HEPATITIS C AB TEST: CPT

## 2024-03-05 PROCEDURE — 70548 MR ANGIOGRAPHY NECK W/DYE: CPT

## 2024-03-05 PROCEDURE — 71046 X-RAY EXAM CHEST 2 VIEWS: CPT

## 2024-03-05 PROCEDURE — 93306 TTE W/DOPPLER COMPLETE: CPT

## 2024-03-05 PROCEDURE — 84484 ASSAY OF TROPONIN QUANT: CPT

## 2024-03-05 PROCEDURE — 70450 CT HEAD/BRAIN W/O DYE: CPT | Mod: MC

## 2024-03-05 PROCEDURE — 99285 EMERGENCY DEPT VISIT HI MDM: CPT

## 2024-03-05 PROCEDURE — 99232 SBSQ HOSP IP/OBS MODERATE 35: CPT

## 2024-03-05 PROCEDURE — 82803 BLOOD GASES ANY COMBINATION: CPT

## 2024-03-05 PROCEDURE — 78452 HT MUSCLE IMAGE SPECT MULT: CPT | Mod: MC

## 2024-03-05 PROCEDURE — 93017 CV STRESS TEST TRACING ONLY: CPT

## 2024-03-05 RX ORDER — POTASSIUM CHLORIDE 20 MEQ
40 PACKET (EA) ORAL EVERY 4 HOURS
Refills: 0 | Status: COMPLETED | OUTPATIENT
Start: 2024-03-05 | End: 2024-03-05

## 2024-03-05 RX ADMIN — VALSARTAN 320 MILLIGRAM(S): 80 TABLET ORAL at 05:03

## 2024-03-05 RX ADMIN — CLOPIDOGREL BISULFATE 75 MILLIGRAM(S): 75 TABLET, FILM COATED ORAL at 11:05

## 2024-03-05 RX ADMIN — Medication 81 MILLIGRAM(S): at 11:05

## 2024-03-05 RX ADMIN — TIOTROPIUM BROMIDE AND OLODATEROL 2 PUFF(S): 3.124; 2.736 SPRAY, METERED RESPIRATORY (INHALATION) at 09:42

## 2024-03-05 RX ADMIN — AMLODIPINE BESYLATE 10 MILLIGRAM(S): 2.5 TABLET ORAL at 05:02

## 2024-03-05 RX ADMIN — PANTOPRAZOLE SODIUM 40 MILLIGRAM(S): 20 TABLET, DELAYED RELEASE ORAL at 05:02

## 2024-03-05 RX ADMIN — Medication 40 MILLIEQUIVALENT(S): at 11:05

## 2024-03-05 RX ADMIN — Medication 40 MILLIEQUIVALENT(S): at 14:26

## 2024-03-05 NOTE — DISCHARGE NOTE NURSING/CASE MANAGEMENT/SOCIAL WORK - NSDCPEFALRISK_GEN_ALL_CORE
For information on Fall & Injury Prevention, visit: [email or web address] OR  [email or web address] OR  https://www.cdc.gov/steadi/patient.html

## 2024-03-05 NOTE — DISCHARGE NOTE PROVIDER - NSDCMRMEDTOKEN_GEN_ALL_CORE_FT
Albuterol (Eqv-ProAir HFA) 90 mcg/inh inhalation aerosol: 2 puff(s) inhaled 4 times a day as needed for  bronchospasm  amLODIPine 10 mg oral tablet: 1 tab(s) orally once a day  aspirin 81 mg oral tablet: 1 day(s) orally once a day  Crestor 40 mg oral tablet: 1 tab(s) orally once a day  hydroCHLOROthiazide 25 mg oral tablet: 1 tab(s) orally once a day  omeprazole 20 mg oral delayed release tablet: 1 tab(s) orally once a day  Plavix 75 mg oral tablet: 1 tab(s) orally once a day  Stiolto Respimat 10 ACT 2.5 mcg-2.5 mcg/inh inhalation aerosol: 2 puff(s) inhaled 2 times a day  valsartan 320 mg oral tablet: 1 tab(s) orally once a day

## 2024-03-05 NOTE — DISCHARGE NOTE NURSING/CASE MANAGEMENT/SOCIAL WORK - PATIENT PORTAL LINK FT
You can access the FollowMyHealth Patient Portal offered by [facility] by registering at the following website: [email or web address]. By joining [facility]’s FollowMyHealth portal, you will also be able to view your health information using other applications (apps) compatible with our system.

## 2024-03-05 NOTE — PROGRESS NOTE ADULT - ASSESSMENT
78F with PMHX CAD s/p CABGx4, HTN, HLD, COPD who presents admitted for Chest Pain r/o ACS and AMS found to have cerebral aneurysm. patient seen at bedside with daughter and is awake and alert and denies any complaints.     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
78F with PMHX CAD s/p CABGx4, HTN, HLD, COPD who presents admitted for Chest Pain r/o ACS and AMS found to have cerebral aneurysm. patient seen at bedside with daughter and is awake and alert and denies any complaints.     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 Likely DC today or tomorrow pending recs  patient and daughter in agreement  
78y Female who is followed by neurology because of AMS/confusion.  Also there was an incidental finding of 7 mm basilar tip aneurysm on head CT    AMS  Suspect mild cognitive impairment, but difficult to determine while hospitalized.   - She has occasional episodes of repeating stories   - She had episode of confusing a past event as a present occurrence   - She was not sure of the year  Does not appear to be toxic-metabolic  Will check B12 and TSH.     Aneurysm  Incidental 7mm basilar tip aneurysm   MRA pending.    
 79 y/o female with hx of CAD s/p CABGx4 (2018), HTN, HLD, COPD who presents with chest pain. Pt states that last evening she started feeling left-sided, non-radiating chest pain. Pain has now improved but pt still feels mild discomfort in her left ribcage. She denies SOB or palpitations. Pt is visiting here from [place]. As per granddaughter, pt was recently hospitalized there for dizziness and was found bradycardic, her Metoprolol was discontinued and heart rate normalized. She gets occasional chest pains and follows with a cardiologist in [place] but is unsure if she ever had any stress test done. In the ED pt was found with elevated D-dimer 696, troponin 13->12, proBNP 74. EKG shows SR with subtle TWI in I and aVL (present in 2018). Echocardiogram in 2018 revealed normal LVEF.

## 2024-03-05 NOTE — DISCHARGE NOTE PROVIDER - DISCHARGE DIET
Regular Diet - No restrictions/Low Sodium Diet Regular Diet - No restrictions/DASH Diet/Low Sodium Diet

## 2024-03-05 NOTE — PROGRESS NOTE ADULT - SUBJECTIVE AND OBJECTIVE BOX
ALEJANDRA MCCLELLANDGIAALLISON    [id number]    78y      Female    INTERVAL HPI/OVERNIGHT EVENTS: patient being seen for ams and chest pain. patient seen post mri and stress test. patient seen at bedside with daughter.       REVIEW OF SYSTEMS:    CONSTITUTIONAL: No fever, weight loss, or fatigue  RESPIRATORY: No cough, wheezing, hemoptysis; No shortness of breath  CARDIOVASCULAR: No chest pain, palpitations  GASTROINTESTINAL: No abdominal or epigastric pain. No nausea, vomiting  NEUROLOGICAL: No headaches, memory loss, loss of strength.  MISCELLANEOUS:      Vital Signs Last 24 Hrs  T(C): 36.4 (04 Mar 2024 11:03), Max: 36.8 (03 Mar 2024 19:05)  T(F): 97.5 (04 Mar 2024 11:03), Max: 98.3 (03 Mar 2024 19:05)  HR: 67 (04 Mar 2024 12:56) (59 - 71)  BP: 132/76 (04 Mar 2024 12:56) (100/60 - 132/76)  BP(mean): --  RR: 18 (04 Mar 2024 11:03) (18 - 18)  SpO2: 97% (04 Mar 2024 11:03) (96% - 97%)    Parameters below as of 04 Mar 2024 11:03  Patient On (Oxygen Delivery Method): room air        PHYSICAL EXAM:    Constitutional: Well-appearing, NAD, VSS  Head: NC/AT  Eyes: PERRL, EOMI, anicteric sclera, conjunctiva WNL  ENT: Normal Pharynx, MMM, No tonsillar exudate/erythema  Neck: Supple, Non-tender  Chest: Non-tender, no rashes  Cardio: RRR, s1/s2, no appreciable murmurs/rubs/gallops  Resp: BS CTA bilaterally, no wheezing/rhonchi/rales  Abd: Soft, Non-tender, Non-distended, no rebound/guarding/rigidity  MSK: moving all extremities, no motor weakness, full ROM x4  Ext: palpable distal pulses, good capillary refill  Psych: appropriate, cooperative  Neuro: CN II-XII grossly intact, no focal deficits  Skin: Warm/Dry. No rashes.      LABS:                        11.4   8.55  )-----------( 205      ( 04 Mar 2024 02:37 )             33.9     03-04    136  |  94<L>  |  15.4  ----------------------------<  132<H>  3.5   |  27.0  |  1.06    Ca    8.7      04 Mar 2024 02:37  Phos  4.0     03-03  Mg     2.4     03-03    TPro  6.8  /  Alb  4.0  /  TBili  0.7  /  DBili  x   /  AST  18  /  ALT  13  /  AlkPhos  99  03-04    PT/INR - ( 02 Mar 2024 21:42 )   PT: 11.1 sec;   INR: 1.00 ratio         PTT - ( 02 Mar 2024 21:42 )  PTT:33.8 sec  Urinalysis Basic - ( 04 Mar 2024 02:37 )    Color: x / Appearance: x / SG: x / pH: x  Gluc: 132 mg/dL / Ketone: x  / Bili: x / Urobili: x   Blood: x / Protein: x / Nitrite: x   Leuk Esterase: x / RBC: x / WBC x   Sq Epi: x / Non Sq Epi: x / Bacteria: x          MEDICATIONS  (STANDING):  amLODIPine   Tablet 10 milliGRAM(s) Oral daily  aspirin  chewable 81 milliGRAM(s) Oral daily  clopidogrel Tablet 75 milliGRAM(s) Oral daily  enoxaparin Injectable 40 milliGRAM(s) SubCutaneous every 24 hours  hydrochlorothiazide 25 milliGRAM(s) Oral daily  pantoprazole    Tablet 40 milliGRAM(s) Oral before breakfast  rosuvastatin 40 milliGRAM(s) Oral at bedtime  tiotropium 2.5 MICROgram(s)/olodaterol 2.5 MICROgram(s) (STIOLTO) Inhaler 2 Puff(s) Inhalation daily  valsartan 320 milliGRAM(s) Oral daily    MEDICATIONS  (PRN):  acetaminophen     Tablet .. 650 milliGRAM(s) Oral every 6 hours PRN Temp greater or equal to 38C (100.4F), Mild Pain (1 - 3)  albuterol    90 MICROgram(s) HFA Inhaler 2 Puff(s) Inhalation every 6 hours PRN for bronchospasm  aluminum hydroxide/magnesium hydroxide/simethicone Suspension 30 milliLiter(s) Oral every 4 hours PRN Dyspepsia  melatonin 3 milliGRAM(s) Oral at bedtime PRN Insomnia  ondansetron Injectable 4 milliGRAM(s) IV Push every 8 hours PRN Nausea and/or Vomiting      RADIOLOGY & ADDITIONAL TESTS:  
                            [facility]                                        Neurology at [place]                                 [facility]                                  [address]                                        [place], [place], [address]                                             [phone]        CC: altered mental status     HPI:   The patient is a 78y Female who presented with chest pain and episode of confusion.  Her granddaughter is present and helps with history and translation.  The patient is visiting from [place] and had episode where she was speaking to her granddaughter about an event that had happened in the past, but to her granddaughter it seemed that she was living this experience out in the present.  The patient is now at her baseline.  There may be some mild confusion at home in [place] but she has not been diagnosed with dementia.  She has bradycardia and cardiac meds are being adjusted. 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  Neurology is asked to assess (AR).    Interim history:  Remains in ER awaiting bed.     ROS:   Denies headache or dizziness.  Denies chest pain.  Denies shortness of breath.    MEDICATIONS  (STANDING):  amLODIPine   Tablet 10 milliGRAM(s) Oral daily  aspirin  chewable 81 milliGRAM(s) Oral daily  clopidogrel Tablet 75 milliGRAM(s) Oral daily  enoxaparin Injectable 40 milliGRAM(s) SubCutaneous every 24 hours  hydrochlorothiazide 25 milliGRAM(s) Oral daily  pantoprazole    Tablet 40 milliGRAM(s) Oral before breakfast  rosuvastatin 40 milliGRAM(s) Oral at bedtime  tiotropium 2.5 MICROgram(s)/olodaterol 2.5 MICROgram(s) (STIOLTO) Inhaler 2 Puff(s) Inhalation daily  valsartan 320 milliGRAM(s) Oral daily    Vital Signs Last 24 Hrs  T(C): 36.4 (04 Mar 2024 11:03), Max: 36.8 (03 Mar 2024 19:05)  T(F): 97.5 (04 Mar 2024 11:03), Max: 98.3 (03 Mar 2024 19:05)  HR: 65 (04 Mar 2024 11:03) (59 - 71)  BP: 114/70 (04 Mar 2024 11:03) (100/60 - 114/70)  RR: 18 (04 Mar 2024 11:03) (18 - 18)  SpO2: 97% (04 Mar 2024 11:03) (94% - 97%)    Parameters below as of 04 Mar 2024 11:03  Patient On (Oxygen Delivery Method): room air    Detailed Neurologic Exam:    Mental status: The patient is awake and alert. Oriented to self and "Hospital." There is no aphasia. There is no dysarthria.     Cranial nerves: Pupils equal and react symmetrically to light. There is no visual field deficit to threat. Extraocular motion is full with no nystagmus. Facial sensation is intact. Facial musculature is symmetric. Palate elevates symmetrically. Tongue is midline.    Motor: There is normal bulk and tone.  There is no tremor.  Strength grossly 5/5 bilaterally.    Sensation: Grossly intact to light touch and pin.    Reflexes: 2+ throughout and plantar responses are flexor.    Cerebellar: No dysmetria on finger nose testing.    Labs:     03-04    136  |  94<L>  |  15.4  ----------------------------<  132<H>  3.5   |  27.0  |  1.06    Ca    8.7      04 Mar 2024 02:37  Phos  4.0     03-03  Mg     2.4     03-03    TPro  6.8  /  Alb  4.0  /  TBili  0.7  /  DBili  x   /  AST  18  /  ALT  13  /  AlkPhos  99  03-04                            11.4   8.55  )-----------( 205      ( 04 Mar 2024 02:37 )             33.9       Rad:   ***      
Hospitalist Progress Note    Chief Complaint:  Chest Pain/SOB/Confusion    SUBJECTIVE / OVERNIGHT EVENTS:  No events overnight, patient seen at bedside, in NAD, no complaints today. Patient denies chest pain, SOB, abd pain, N/V, fever, chills, dysuria or any other complaints. All remainder ROS negative.     MEDICATIONS  (STANDING):  amLODIPine   Tablet 10 milliGRAM(s) Oral daily  aspirin  chewable 81 milliGRAM(s) Oral daily  clopidogrel Tablet 75 milliGRAM(s) Oral daily  enoxaparin Injectable 40 milliGRAM(s) SubCutaneous every 24 hours  hydrochlorothiazide 25 milliGRAM(s) Oral daily  pantoprazole    Tablet 40 milliGRAM(s) Oral before breakfast  potassium chloride    Tablet ER 40 milliEquivalent(s) Oral every 4 hours  rosuvastatin 40 milliGRAM(s) Oral at bedtime  tiotropium 2.5 MICROgram(s)/olodaterol 2.5 MICROgram(s) (STIOLTO) Inhaler 2 Puff(s) Inhalation daily  valsartan 320 milliGRAM(s) Oral daily    MEDICATIONS  (PRN):  acetaminophen     Tablet .. 650 milliGRAM(s) Oral every 6 hours PRN Temp greater or equal to 38C (100.4F), Mild Pain (1 - 3)  albuterol    90 MICROgram(s) HFA Inhaler 2 Puff(s) Inhalation every 6 hours PRN for bronchospasm  aluminum hydroxide/magnesium hydroxide/simethicone Suspension 30 milliLiter(s) Oral every 4 hours PRN Dyspepsia  melatonin 3 milliGRAM(s) Oral at bedtime PRN Insomnia  ondansetron Injectable 4 milliGRAM(s) IV Push every 8 hours PRN Nausea and/or Vomiting        I&O's Summary    04 Mar 2024 07:01  -  05 Mar 2024 07:00  --------------------------------------------------------  IN: 400 mL / OUT: 0 mL / NET: 400 mL        PHYSICAL EXAM:  Vital Signs Last 24 Hrs  T(C): 36.9 (05 Mar 2024 04:53), Max: 36.9 (05 Mar 2024 04:53)  T(F): 98.5 (05 Mar 2024 04:53), Max: 98.5 (05 Mar 2024 04:53)  HR: 60 (05 Mar 2024 04:53) (60 - 79)  BP: 120/63 (05 Mar 2024 04:53) (105/70 - 132/76)  BP(mean): --  RR: 18 (05 Mar 2024 04:53) (18 - 18)  SpO2: 96% (05 Mar 2024 04:53) (94% - 97%)    Parameters below as of 05 Mar 2024 04:53  Patient On (Oxygen Delivery Method): room air            CONSTITUTIONAL: NAD,  well-groomed  HEENMT: NC/AT, PERRLA, EOMI, Moist oral mucosa, no pharyngeal injection or exudates; normal dentition  RESPIRATORY: BLAE, No adventitious Lungs Sounds  CARDIOVASCULAR: S1/S2+, RRR, no MRG, No LE Edema  ABDOMEN: Soft, NT/ND, BS+, No guarding  MSK:  Moves limbs with purpose and direction; no clubbing or cyanosis of digits; no joint swelling or tenderness to palpation  PSYCH: normal mood and affect  NEUROLOGY: AAOx4, CN 2-12 are intact and symmetric; no gross sensory deficits;   SKIN: Warm, Dry, No rashes; no palpable lesions    LABS:                        11.8   8.23  )-----------( 231      ( 05 Mar 2024 06:06 )             35.8     03-05    138  |  95<L>  |  15.8  ----------------------------<  132<H>  3.3<L>   |  27.0  |  1.06    Ca    9.1      05 Mar 2024 06:06  Phos  4.0     03-03  Mg     2.4     03-05    TPro  6.8  /  Alb  4.0  /  TBili  0.8  /  DBili  x   /  AST  14  /  ALT  12  /  AlkPhos  101  03-05          Urinalysis Basic - ( 05 Mar 2024 06:06 )    Color: x / Appearance: x / SG: x / pH: x  Gluc: 132 mg/dL / Ketone: x  / Bili: x / Urobili: x   Blood: x / Protein: x / Nitrite: x   Leuk Esterase: x / RBC: x / WBC x   Sq Epi: x / Non Sq Epi: x / Bacteria: x        Culture - Urine (collected 02 Mar 2024 22:45)  Source: Clean Catch Clean Catch (Midstream)  Final Report (04 Mar 2024 07:15):    <10,000 CFU/mL Normal Urogenital Elsy      CAPILLARY BLOOD GLUCOSE            RADIOLOGY & ADDITIONAL TESTS:  Results Reviewed: Y  Imaging Personally Reviewed: N  Electrocardiogram Personally Reviewed: N                                          
                                                         [facility]                                                         CARDIOLOGY AT [facility]                                                                  [address] [address]                                                         Telephone: [phone]. Fax:[phone]                                                                             PROGRESS NOTE    Reason for follow up: Chest Pain  Update: now s/p NST       Review of symptoms:   Cardiac:  No chest pain. No dyspnea. No palpitations.  Respiratory: no cough. No dyspnea  Gastrointestinal: No diarrhea. No abdominal pain. No bleeding.   Neuro: No focal neuro complaints.    Vitals:  T(C): 36.7 (03-04-24 @ 15:42), Max: 36.8 (03-03-24 @ 19:05)  HR: 72 (03-04-24 @ 15:42) (59 - 72)  BP: 106/65 (03-04-24 @ 15:42) (100/60 - 132/76)  RR: 18 (03-04-24 @ 15:42) (18 - 18)  SpO2: 94% (03-04-24 @ 15:42) (94% - 97%)  Wt(kg): --  I&O's Summary    Weight (kg): 81.3 (03-02 @ 19:58)    PHYSICAL EXAM:  Appearance: Comfortable. No acute distress  HEENT:  Atraumatic. Normocephalic.  Normal oral mucosa  Neurologic: A & O x 3, no gross focal deficits.  Cardiovascular: RRR S1 S2, No murmur, no rubs/gallops. No JVD  Respiratory: Lungs clear to auscultation, unlabored   Gastrointestinal:  Soft, Non-tender, + BS  Lower Extremities: 2+ Peripheral Pulses, No clubbing, cyanosis, or edema  Psychiatry: Patient is calm. No agitation.   Skin: warm and dry.    CURRENT CARDIAC MEDICATIONS:  amLODIPine   Tablet 10 milliGRAM(s) Oral daily  hydrochlorothiazide 25 milliGRAM(s) Oral daily  valsartan 320 milliGRAM(s) Oral daily      CURRENT OTHER MEDICATIONS:  albuterol    90 MICROgram(s) HFA Inhaler 2 Puff(s) Inhalation every 6 hours PRN for bronchospasm  tiotropium 2.5 MICROgram(s)/olodaterol 2.5 MICROgram(s) (STIOLTO) Inhaler 2 Puff(s) Inhalation daily  acetaminophen     Tablet .. 650 milliGRAM(s) Oral every 6 hours PRN Temp greater or equal to 38C (100.4F), Mild Pain (1 - 3)  melatonin 3 milliGRAM(s) Oral at bedtime PRN Insomnia  ondansetron Injectable 4 milliGRAM(s) IV Push every 8 hours PRN Nausea and/or Vomiting  aluminum hydroxide/magnesium hydroxide/simethicone Suspension 30 milliLiter(s) Oral every 4 hours PRN Dyspepsia  pantoprazole    Tablet 40 milliGRAM(s) Oral before breakfast  rosuvastatin 40 milliGRAM(s) Oral at bedtime  aspirin  chewable 81 milliGRAM(s) Oral daily  clopidogrel Tablet 75 milliGRAM(s) Oral daily  enoxaparin Injectable 40 milliGRAM(s) SubCutaneous every 24 hours      LABS:	 	                            11.4   8.55  )-----------( 205      ( 04 Mar 2024 02:37 )             33.9     03-04    136  |  94<L>  |  15.4  ----------------------------<  132<H>  3.5   |  27.0  |  1.06    Ca    8.7      04 Mar 2024 02:37  Phos  4.0     03-03  Mg     2.4     03-03    TPro  6.8  /  Alb  4.0  /  TBili  0.7  /  DBili  x   /  AST  18  /  ALT  13  /  AlkPhos  99  03-04    PT/INR/PTT ( 02 Mar 2024 21:42 )                       :                       :      11.1         :       33.8                  .        .                   .              .           .       1.00        .                                       Lipid Profile:   HgA1c:   TSH:     TELEMETRY: NSR 70s  ECG:    DIAGNOSTIC TESTING:  [ ] Echocardiogram:   < from: TTE W or WO Ultrasound Enhancing Agent (03.03.24 @ 10:22) >  TRANSTHORACIC ECHOCARDIOGRAM REPORT  ________________________________________________________________________________                                      _______       Pt. Name:       ALEJANDRA STEWART                  Study Date:    3/3/2024        ALFONZO  MRN:            [id number]                     YOB: 1945  Accession #:    [id number]                    Age:           78 years  Account#:       [id number]                   Gender:        F  Heart Rate:                        Height:        61.00 in (154.94 cm)  Rhythm:                                      Weight:        178.00 lb (80.74 kg)  Blood Pressure: 134/68 mmHg                  BSA/BMI:       1.80 m² / 33.63                               kg/m²  ________________________________________________________________________________________  Referring Physician:    Mary Grace Maguire  Interpreting Physician: Leta Young MD  Primary Sonographer:    Concepcion Dickerson    CPT:               ECHO TTE WO CON COMP W [facility] - 55269.m  Indication(s):     Chest pain, unspecified - R07.9  Procedure:         Transthoracic echocardiogram with 2-D, M-mode and complete                     spectral and color flow Doppler.  Ordering Location: [facility]  Admission Status:  ED  Study Information: Image quality for this study is technically difficult.    _______________________________________________________________________________________     CONCLUSIONS:      1. Left ventricular cavity is normal in size. Left ventricular systolic function is normal with an ejection fraction of 60 % by Armas's method of disks with an ejection fraction visually estimated at 55 to 60 %.   2. There is mild (grade 1) left ventricular diastolic dysfunction.   3. The left atrium is normal.   4. The right atrium is normal in size.   5. No pericardial effusion seen.   6. Estimated pulmonary artery systolic pressure is 22 mmHg.   7. Mild left ventricular hypertrophy.   8. No prior echocardiogram is available for comparison.   9. Technically difficult image quality.  10. The interatrial septum appears intact.    < end of copied text >  [ ]  Catheterization:< from: Nuclear Stress Test-Pharmacologic.. (03.04.24 @ 07:34) >    Nuclear Pharmacologic Stress Test Report         Patient: ALEJANDRA STEWART                     Study Date: 3/4/2024                ALFONZO           MRN: [id number]                              Birth 1945 Age: 78                        Date/Age: years      Access #: [id number]                           Gender: F     Order Loc: [facility]                               Height: 142.2 cm/ 56.0 in  Request Phys: [id number] Not Available Doctor         Weight: 81.19 kg/ 179.00 lb     Procedure: Rest|Stress Pharmacologic         BSA/ BMI: 1.69 m²/ 40.13 kg/m²    Indication: Chest Pain - R07.9           Admiss Status:    Fellow/ACP: Edilma Riley NP              Fellow/ACP:    ---------------------------------------------------------------------------------------------------------------------------------------------------------  Procedure Code: MYOCARDIAL SPECT MULTIPLE - 92152.m;TC 99M SESTAMIBI PER DOSE -                  .m;INJECTION REGADENOSON - .m;TC 99M SESTAMIBI PER                  DOSE 2nd - .m;STRESS TEST TRACING ONLY - 68735.m    ---------------------------------------------------------------------------------------------------------------------------------------------------------  History:       Known coronary artery disease and S/P CABG. 79 yo female with c/o                 chest pain, history of CAD.  Risk Factors:  Hypertension, dyslipidemia and history of smoking.  Image Quality: Fair  Artifact:      Soft tissue attenuation.  Medications:   Amlodipine, ASA, clopidogrel, Lovenox, HCTZ, pantoprazole,                 rosuvastatin, valsartan, Stiolto.  NDC Number(s): 85472-543-83  Allergies:     None    --------------------------------------------------------------------------------------------------------------------------------------------------------Conclusions:   1. Inability to exercise due to decrease exercise capacity.   2. The patient underwent stress testing using pharmacological IV regadenoson.   3. No cardiac symptoms. No ischemic ECG changes.   4. Normal myocardial perfusion scan, with no evidence of infarction or inducible ischemia.   5. The left ventricle is normal in function. The post stress left ventricular EF is 76 %.    < end of copied text >    [ ] Stress Test:    OTHER:

## 2024-03-05 NOTE — DISCHARGE NOTE PROVIDER - CARE PROVIDER_API CALL
Donald Francisco  Cardiology  [address], [address]  [place] [address]  Phone: [phone]  Fax: [phone]  Follow Up Time: 2 weeks    PCP,   Phone: (   )    -  Fax: (   )    -  Follow Up Time: Routine    George See  Neurology  [address], [address]  [place] [address]  Phone: [phone]  Fax: [phone]  Follow Up Time: Routine   Donald Francisco  Cardiology  [address], [address]  [place] [address]  Phone: [phone]  Fax: [phone]  Follow Up Time: 2 weeks    PCP,   Phone: (   )    -  Fax: (   )    -  Follow Up Time: Routine    Arley Oconnell  Neurology  [address] [place] [address]  Phone: [phone]  Fax: [phone]  Follow Up Time: 1 week

## 2024-03-05 NOTE — DISCHARGE NOTE PROVIDER - CARE PROVIDERS DIRECT ADDRESSES
,[email or web address],[email or web address],[email or web address],DirectAddress_Unknown,[email or web address]

## 2024-03-05 NOTE — DISCHARGE NOTE PROVIDER - HOSPITAL COURSE
77 y/o F with PMHx od CAD s/p CABGx4, HTN, HLD, and COPD presents to the ED with chest pain. Pt states that she started feeling left-sided, non-radiating chest pain 1 day prior to admission. Pain improved but pt still feels mild discomfort in her left ribcage. Pt is visiting here from [place]. As per granddaughter, pt was recently hospitalized there for dizziness and was found bradycardic, her Metoprolol was discontinued and heart rate normalized. She gets occasional chest pains and follows with a cardiologist in [place] but is unsure if she ever had any stress test done. In the ED pt was found with elevated D-dimer 696, troponin 13->12, proBNP 74. EKG shows SR with subtle TWI in I and aVL (present in 2018). Echocardiogram in 2018 revealed normal LVEF. On exam pt was also mildly confused. CT head ordered and revealed a +7mm basilar tip aneurysm. Cardio and Neuro recs. No further inpatient cardiac work recommended by cardio up at this time. Patient should follow up in 2 weeks with outpatient cardiologist. MRA performed.     Neurology recommends ___      Chest Pain  Cardiac enzymes negative. D-dimer elevated but s  -Trend Cardiac Enzymes -> Negative x2  -D-Dimer Elevated   -LE Venous Dopplers Negative  -CTA Chest negative for PE  -TTE appreciated  - stress appreciared    AMS, Brain Aneurysm  -CTH WO +7 mm basilar tip aneurysm  - mri reviewedd  - plan as per neuro  - outpatient follow up vs inpatient    CAD s/p CABG, HTN, HLD  -Continue DAPT ASA 81 (100mg)/Plavix 75mg q24  -Amlodipine 10mg q24  -HCTZ 25mg q24  -Valsartan 320mg q24  -BB dc'd due to bradycardia   -Crestor 40mg q24    COPD  -Albuterol/Stiolto     79 y/o F with PMHx od CAD s/p CABGx4, HTN, HLD, and COPD presents to the ED with chest pain. Pt states that she started feeling left-sided, non-radiating chest pain 1 day prior to admission. Pain improved but pt still feels mild discomfort in her left ribcage. Pt is visiting here from [place]. As per granddaughter, pt was recently hospitalized there for dizziness and was found bradycardic, her Metoprolol was discontinued and heart rate normalized. She gets occasional chest pains and follows with a cardiologist in [place] but is unsure if she ever had any stress test done. In the ED pt was found with elevated D-dimer 696, troponin 13->12, proBNP 74. EKG shows SR with subtle TWI in I and aVL (present in 2018). Echocardiogram in 2018 revealed normal LVEF. On exam pt was also mildly confused. CT head ordered and revealed a +7mm basilar tip aneurysm. Cardio and Neuro recs. NST performed and negative for infarction. No further inpatient work up recommended by cardio up at this time. Patient should follow up in 2 weeks with outpatient cardiologist. MRA performed.     Neurology recommends ___       79 y/o F with PMHx od CAD s/p CABGx4, HTN, HLD, and COPD presents to the ED with chest pain. Pt states that she started feeling left-sided, non-radiating chest pain 1 day prior to admission. Pain improved but pt still feels mild discomfort in her left ribcage. Pt is visiting here from [place]. As per granddaughter, pt was recently hospitalized there for dizziness and was found bradycardic, her Metoprolol was discontinued and heart rate normalized. She gets occasional chest pains and follows with a cardiologist in [place] but is unsure if she ever had any stress test done. In the ED pt was found with elevated D-dimer 696, troponin 13->12, proBNP 74. EKG shows SR with subtle TWI in I and aVL (present in 2018). Echocardiogram in 2018 revealed normal LVEF. On exam pt was also mildly confused. CT head ordered and revealed a +7mm basilar tip aneurysm. Cardio and Neuro recs. NST performed and negative for infarction. No further inpatient work up recommended by cardio up at this time. Patient should follow up in 2 weeks with outpatient cardiologist. MRA performed. No further inpatient workup recommended. Neurology recommends outpatient follow up with Dr. Oconnell on 3/11/24. Patient is medically stable for discharge.

## 2024-03-05 NOTE — CHART NOTE - NSCHARTNOTEFT_GEN_A_CORE
[facility]                                        Neurology at [place]                                 Manuel Jones, & Vince                                  [address]                                        [place], [place], [address]                                             [phone]          MRA done.  Complex appearance of the   basilar tip suggesting centrally thrombosed and peripherally patent saccular aneurysm.   Note that vascular developmental duplication and fenestration could have a similar appearance.     Vascular neurology to see patient to decide upon angiogram.
Received a consult from Dr. Jackson for possible basilar tip aneurysm appreciated on CT head. Imaging reviewed, likely basilar aneurysm >7mm. Pt will need vascular imaging, MRA or CTA head and neck. Appreciate general neuro recommendation for evaluation of AMS.   Full consult to follow
Brief Attending Chart Note:    78F with PMHX CAD s/p CABGx4, HTN, HLD, COPD who presents admitted for Chest Pain r/o ACS and AMS found to have cerebral aneurysm. Seen and examined at bedside. Grand-daughter at bedside. Patient endorses mild chest pain still though improved from yesterday. seems musculoskeletal on examination. Plan for TTE, stress test, MRA per neurology and cardiology. Continue with plan of care as per H/P.

## 2024-03-05 NOTE — DISCHARGE NOTE PROVIDER - PROVIDER TOKENS
PROVIDER:[TOKEN:[[id number]],FOLLOWUP:[2 weeks]],FREE:[LAST:[PCP],PHONE:[(   )    -],FAX:[(   )    -],FOLLOWUP:[Routine]],PROVIDER:[TOKEN:[[id number]],FOLLOWUP:[Routine]] PROVIDER:[TOKEN:[[id number]],FOLLOWUP:[2 weeks]],FREE:[LAST:[PCP],PHONE:[(   )    -],FAX:[(   )    -],FOLLOWUP:[Routine]],PROVIDER:[TOKEN:[[id number]],FOLLOWUP:[1 week]]

## 2024-03-05 NOTE — DISCHARGE NOTE PROVIDER - NSDCCPCAREPLAN_GEN_ALL_CORE_FT
PRINCIPAL DISCHARGE DIAGNOSIS  Diagnosis: Cerebral aneurysm  Assessment and Plan of Treatment: CT head shows a ~7 mm basilar tip aneurysm. MRI performed and Neurololgy was consulted. No further in patient workup is recommended. Please follow up with Dr. Oconnell on 3/11/24 outpatient for an Angiogram.   Return to the ED if experiencing stroke-like symptoms. Weakness, confusion, slurring of words, and/or any other concerning symptoms.        SECONDARY DISCHARGE DIAGNOSES  Diagnosis: Chest pain  Assessment and Plan of Treatment: Enzymes stable and negative. Imaging negative for acute pathology. Continue to follow up with outpatient cardiology.    Diagnosis: History of COPD  Assessment and Plan of Treatment: Continue with Albuterol/Stiolto.    Diagnosis: HTN (hypertension)  Assessment and Plan of Treatment: Continue with Valsartan, Hydrochlorothiazide, and Amlodipine.    Diagnosis: HLD (hyperlipidemia)  Assessment and Plan of Treatment: Continue with Crestor.    Diagnosis: CAD (coronary artery disease)  Assessment and Plan of Treatment: Histroy of CABG, Continue with Plavix and Aspirin.

## 2024-03-11 ENCOUNTER — APPOINTMENT (OUTPATIENT)
Dept: NEUROLOGY | Facility: HOSPITAL | Age: 79
End: 2024-03-11

## 2024-03-11 ENCOUNTER — INPATIENT (INPATIENT)
Facility: HOSPITAL | Age: 79
LOS: 2 days | Discharge: HOME | DRG: 93 | End: 2024-03-14
Attending: SPECIALIST | Admitting: EMERGENCY MEDICINE
Payer: MEDICAID

## 2024-03-11 VITALS
HEIGHT: 62 IN | HEART RATE: 80 BPM | WEIGHT: 174.17 LBS | OXYGEN SATURATION: 98 % | TEMPERATURE: 98 F | DIASTOLIC BLOOD PRESSURE: 74 MMHG | RESPIRATION RATE: 20 BRPM | SYSTOLIC BLOOD PRESSURE: 134 MMHG

## 2024-03-11 DIAGNOSIS — I67.1 CEREBRAL ANEURYSM, NONRUPTURED: ICD-10-CM

## 2024-03-11 DIAGNOSIS — Z95.1 PRESENCE OF AORTOCORONARY BYPASS GRAFT: Chronic | ICD-10-CM

## 2024-03-11 LAB
ALBUMIN SERPL ELPH-MCNC: 3.9 G/DL — SIGNIFICANT CHANGE UP (ref 3.3–5.2)
ALP SERPL-CCNC: 95 U/L — SIGNIFICANT CHANGE UP (ref 40–120)
ALT FLD-CCNC: 12 U/L — SIGNIFICANT CHANGE UP
ANION GAP SERPL CALC-SCNC: 15 MMOL/L — SIGNIFICANT CHANGE UP (ref 5–17)
APTT BLD: 34.2 SEC — SIGNIFICANT CHANGE UP (ref 24.5–35.6)
AST SERPL-CCNC: 15 U/L — SIGNIFICANT CHANGE UP
BASOPHILS # BLD AUTO: 0.1 K/UL — SIGNIFICANT CHANGE UP (ref 0–0.2)
BASOPHILS NFR BLD AUTO: 1.1 % — SIGNIFICANT CHANGE UP (ref 0–2)
BILIRUB SERPL-MCNC: 0.4 MG/DL — SIGNIFICANT CHANGE UP (ref 0.4–2)
BLD GP AB SCN SERPL QL: SIGNIFICANT CHANGE UP
BUN SERPL-MCNC: 14.6 MG/DL — SIGNIFICANT CHANGE UP (ref 8–20)
CALCIUM SERPL-MCNC: 9.3 MG/DL — SIGNIFICANT CHANGE UP (ref 8.4–10.5)
CHLORIDE SERPL-SCNC: 95 MMOL/L — LOW (ref 96–108)
CO2 SERPL-SCNC: 27 MMOL/L — SIGNIFICANT CHANGE UP (ref 22–29)
CREAT SERPL-MCNC: 0.85 MG/DL — SIGNIFICANT CHANGE UP (ref 0.5–1.3)
EGFR: 71 ML/MIN/1.73M2 — SIGNIFICANT CHANGE UP
EOSINOPHIL # BLD AUTO: 0.39 K/UL — SIGNIFICANT CHANGE UP (ref 0–0.5)
EOSINOPHIL NFR BLD AUTO: 4.2 % — SIGNIFICANT CHANGE UP (ref 0–6)
GLUCOSE SERPL-MCNC: 136 MG/DL — HIGH (ref 70–99)
HCT VFR BLD CALC: 33 % — LOW (ref 34.5–45)
HGB BLD-MCNC: 11.2 G/DL — LOW (ref 11.5–15.5)
IMM GRANULOCYTES NFR BLD AUTO: 0.6 % — SIGNIFICANT CHANGE UP (ref 0–0.9)
INR BLD: 0.98 RATIO — SIGNIFICANT CHANGE UP (ref 0.85–1.18)
LYMPHOCYTES # BLD AUTO: 1.99 K/UL — SIGNIFICANT CHANGE UP (ref 1–3.3)
LYMPHOCYTES # BLD AUTO: 21.4 % — SIGNIFICANT CHANGE UP (ref 13–44)
MCHC RBC-ENTMCNC: 31.8 PG — SIGNIFICANT CHANGE UP (ref 27–34)
MCHC RBC-ENTMCNC: 33.9 GM/DL — SIGNIFICANT CHANGE UP (ref 32–36)
MCV RBC AUTO: 93.8 FL — SIGNIFICANT CHANGE UP (ref 80–100)
MONOCYTES # BLD AUTO: 0.83 K/UL — SIGNIFICANT CHANGE UP (ref 0–0.9)
MONOCYTES NFR BLD AUTO: 8.9 % — SIGNIFICANT CHANGE UP (ref 2–14)
NEUTROPHILS # BLD AUTO: 5.93 K/UL — SIGNIFICANT CHANGE UP (ref 1.8–7.4)
NEUTROPHILS NFR BLD AUTO: 63.8 % — SIGNIFICANT CHANGE UP (ref 43–77)
PLATELET # BLD AUTO: 237 K/UL — SIGNIFICANT CHANGE UP (ref 150–400)
POTASSIUM SERPL-MCNC: 3.9 MMOL/L — SIGNIFICANT CHANGE UP (ref 3.5–5.3)
POTASSIUM SERPL-SCNC: 3.9 MMOL/L — SIGNIFICANT CHANGE UP (ref 3.5–5.3)
PROT SERPL-MCNC: 6.7 G/DL — SIGNIFICANT CHANGE UP (ref 6.6–8.7)
PROTHROM AB SERPL-ACNC: 10.9 SEC — SIGNIFICANT CHANGE UP (ref 9.5–13)
RBC # BLD: 3.52 M/UL — LOW (ref 3.8–5.2)
RBC # FLD: 11.9 % — SIGNIFICANT CHANGE UP (ref 10.3–14.5)
SODIUM SERPL-SCNC: 137 MMOL/L — SIGNIFICANT CHANGE UP (ref 135–145)
WBC # BLD: 9.3 K/UL — SIGNIFICANT CHANGE UP (ref 3.8–10.5)
WBC # FLD AUTO: 9.3 K/UL — SIGNIFICANT CHANGE UP (ref 3.8–10.5)

## 2024-03-11 PROCEDURE — 70496 CT ANGIOGRAPHY HEAD: CPT | Mod: 26,MC

## 2024-03-11 PROCEDURE — 71045 X-RAY EXAM CHEST 1 VIEW: CPT | Mod: 26

## 2024-03-11 PROCEDURE — 70498 CT ANGIOGRAPHY NECK: CPT | Mod: 26,MC

## 2024-03-11 PROCEDURE — 93010 ELECTROCARDIOGRAM REPORT: CPT

## 2024-03-11 PROCEDURE — 99285 EMERGENCY DEPT VISIT HI MDM: CPT

## 2024-03-11 PROCEDURE — 70551 MRI BRAIN STEM W/O DYE: CPT | Mod: 26

## 2024-03-11 PROCEDURE — 70450 CT HEAD/BRAIN W/O DYE: CPT | Mod: 26,MC

## 2024-03-11 PROCEDURE — 99223 1ST HOSP IP/OBS HIGH 75: CPT

## 2024-03-11 RX ORDER — SODIUM CHLORIDE 9 MG/ML
1000 INJECTION INTRAMUSCULAR; INTRAVENOUS; SUBCUTANEOUS
Refills: 0 | Status: DISCONTINUED | OUTPATIENT
Start: 2024-03-11 | End: 2024-03-14

## 2024-03-11 RX ORDER — TIOTROPIUM BROMIDE AND OLODATEROL 3.124; 2.736 UG/1; UG/1
2 SPRAY, METERED RESPIRATORY (INHALATION)
Refills: 0 | Status: DISCONTINUED | OUTPATIENT
Start: 2024-03-11 | End: 2024-03-14

## 2024-03-11 RX ORDER — ENOXAPARIN SODIUM 100 MG/ML
40 INJECTION SUBCUTANEOUS EVERY 24 HOURS
Refills: 0 | Status: DISCONTINUED | OUTPATIENT
Start: 2024-03-11 | End: 2024-03-14

## 2024-03-11 RX ORDER — ASPIRIN/CALCIUM CARB/MAGNESIUM 324 MG
81 TABLET ORAL DAILY
Refills: 0 | Status: DISCONTINUED | OUTPATIENT
Start: 2024-03-11 | End: 2024-03-14

## 2024-03-11 RX ORDER — PANTOPRAZOLE SODIUM 20 MG/1
40 TABLET, DELAYED RELEASE ORAL
Refills: 0 | Status: DISCONTINUED | OUTPATIENT
Start: 2024-03-11 | End: 2024-03-14

## 2024-03-11 RX ORDER — ACETAMINOPHEN 500 MG
1000 TABLET ORAL ONCE
Refills: 0 | Status: COMPLETED | OUTPATIENT
Start: 2024-03-11 | End: 2024-03-11

## 2024-03-11 RX ORDER — CLOPIDOGREL BISULFATE 75 MG/1
75 TABLET, FILM COATED ORAL DAILY
Refills: 0 | Status: DISCONTINUED | OUTPATIENT
Start: 2024-03-11 | End: 2024-03-14

## 2024-03-11 RX ORDER — ALBUTEROL 90 UG/1
2 AEROSOL, METERED ORAL EVERY 6 HOURS
Refills: 0 | Status: DISCONTINUED | OUTPATIENT
Start: 2024-03-11 | End: 2024-03-14

## 2024-03-11 RX ADMIN — Medication 400 MILLIGRAM(S): at 11:01

## 2024-03-11 NOTE — ED ADULT TRIAGE NOTE - CHIEF COMPLAINT QUOTE
Pt arrives to ED c/o HA/ dizziness  for two days- Pt was discharged from the hospital on March 5th diagnosed with 7 mm basilar tip aneurysm

## 2024-03-11 NOTE — H&P ADULT - NSHPREVIEWOFSYSTEMS_GEN_ALL_CORE
REVIEW OF SYSTEMS:  Constitutional: No fevers, body aches, chills, night sweats.  HEENT: No difficulty swallowing, visual disturbances, ear pain/drainage/swelling, eye pain/drainage/swelling, throat pain/swelling.  Neck: Reports Left-sided posterior neck pain. No tenderness, or swelling.  Respiratory: No cough, SOB, hemoptysis, wheezing.  Cardiovascular: No chest pain, palpitations, syncope, dyspnea, edema.  Gastrointestinal: No abdominal or epigastric pain. No N/V, diarrhea, constipation, bloody stools.  Genitourinary: No dysuria, frequency, hematuria, incontinence.   Neurological: Reports Left-sided posterior headache. No weakness, falls, numbness, tingling.  Musculoskeletal: No paresthesias, swelling, weakness.  Skin: No rashes, lesions, wounds. Constitutional: No fevers, body aches, chills, night sweats.  HEENT: No difficulty swallowing, visual disturbances, ear pain/drainage/swelling, eye pain/drainage/swelling, throat pain/swelling.  Neck: Reports Left-sided posterior neck pain. No tenderness, or swelling.  Respiratory: No cough, SOB, hemoptysis, wheezing.  Cardiovascular: No chest pain, palpitations, syncope, dyspnea, edema.  Gastrointestinal: No abdominal or epigastric pain. No N/V, diarrhea, constipation, bloody stools.  Genitourinary: No dysuria, frequency, hematuria, incontinence.   Neurological: Reports Left-sided posterior headache. No weakness, falls, numbness, tingling.  Musculoskeletal: No paresthesias, swelling, weakness.  Skin: No rashes, lesions, wounds.

## 2024-03-11 NOTE — H&P ADULT - HISTORY OF PRESENT ILLNESS
Preliminary note, official note pending attending review/signature.                               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 Patient was recently discharged from [facility] on 3/5/24 after admission for chief complaint of SOB, chest pain, and confusion; patient had cardiac workup done and basilar tip aneurysm was incidental finding on CT Head, better characterized on follow-up CTA and MRA. Neuro IR evaluated patient during prior admission, with plan for patient to follow-up and see Dr. Oconnell outpatient for diagnostic cerebral angiogram with plan for possible intervention if indicated. Patient did not follow-up outpatient due to insurance issues and now with headache and neck pain, patient came to ED for further evaluation. Patient admitted to Stroke Neurology service for further care. Preliminary note, official note pending attending review/signature.                               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 Patient was recently discharged from [facility] on 3/5/24 after admission for chief complaint of SOB, chest pain, and confusion; patient had cardiac workup done and basilar tip aneurysm was incidental finding on CT Head, better characterized on follow-up CTA and MRA. Neuro IR evaluated patient during prior admission, with plan for patient to follow-up and see Dr. Oconnell, plan for likely outpatient for diagnostic cerebral angiogram with plan for possible intervention if indicated. Patient did not follow-up outpatient due to insurance issues and now with headache and neck pain, patient came to ED for further evaluation. Patient admitted to Stroke Neurology service for further care.                              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 Patient was recently discharged from [facility] on 3/5/24 after admission for chief complaint of SOB, chest pain, and confusion; patient had cardiac workup done and basilar tip aneurysm was incidental finding on CT Head, better characterized on follow-up CTA and MRA. Neuro IR evaluated patient during prior admission, with plan for patient to follow-up and see Dr. Oconnell, plan for likely outpatient for diagnostic cerebral angiogram with plan for possible intervention if indicated. Patient did not follow-up outpatient due to insurance issues and now with headache and neck pain, patient came to ED for further evaluation. Patient admitted to Stroke Neurology service for further care.

## 2024-03-11 NOTE — ED PROVIDER NOTE - CONSTITUTIONAL, MLM
normal... elderly female awake, alert, oriented to person, place, time/situation and  appears uncomfortable

## 2024-03-11 NOTE — H&P ADULT - NSHPSOCIALHISTORY_GEN_ALL_CORE
Prior smoker, states she started smoking cigarettes ~1 pack per day around age 17 and stopped in July 2018 (~55 pack years)  Denies ETOH use, denies drug use    Patient originally from [place], lives with family here.

## 2024-03-11 NOTE — ED PROVIDER NOTE - OBJECTIVE STATEMENT
75-year-old [place]az[place]-speaking female with history of hypertension, high cholesterol, CAD  status post CABG and status post recent discharge from [facility] with diagnosis of 7 mm brain aneurysm presents to ED complaining of 2-day history of left posterior headache as well as left-sided posterior neck pain.  Patient complaining of feeling dizzy and weak with questionable blurred vision at times.   no reported vomiting or focal weakness. Patient was supposed to follow-up with neuro IR/Dr. Russell  as an outpatient but was unable to secondary to lack of insurance.   patient took over-the-counter Tylenol without relief

## 2024-03-11 NOTE — H&P ADULT - NSICDXPASTMEDICALHX_GEN_ALL_CORE_FT
PAST MEDICAL HISTORY:  Coronary artery disease involving native coronary artery of native heart, angina presence unspecified     History of COPD     HLD (hyperlipidemia)     HTN (hypertension)     NSTEMI (non-ST elevated myocardial infarction)     S/P CABG x 4     Smoker quit on 7/6 admission

## 2024-03-11 NOTE — ED PROVIDER NOTE - NSICDXPASTMEDICALHX_GEN_ALL_CORE_FT
PAST MEDICAL HISTORY:  Coronary artery disease involving native coronary artery of native heart, angina presence unspecified     HTN (hypertension)     NSTEMI (non-ST elevated myocardial infarction)     S/P CABG x 4     Smoker quit on 7/6 admission

## 2024-03-11 NOTE — ED STATDOCS - COVID-19  TEST TYPE
normal... Well appearing, awake, alert, oriented to person, place, time/situation and in no apparent distress. MOLECULAR PCR

## 2024-03-11 NOTE — H&P ADULT - NSHPLABSRESULTS_GEN_ALL_CORE
Complete Blood Count + Automated Diff (03.11.24 @ 10:45)   WBC Count: 9.30 K/uL  RBC Count: 3.52 M/uL  Hemoglobin: 11.2 g/dL  Hematocrit: 33.0 %  Mean Cell Volume: 93.8 fl  Mean Cell Hemoglobin: 31.8 pg  Mean Cell Hemoglobin Conc: 33.9 gm/dL  Red Cell Distrib Width: 11.9 %  Platelet Count - Automated: 237 K/uL    Comprehensive Metabolic Panel (03.11.24 @ 10:45)   Sodium: 137 mmol/L  Potassium: 3.9 mmol/L  Chloride: 95: Chloride reference range changed from ..10/26/2022 mmol/L  Carbon Dioxide: 27.0 mmol/L  Anion Gap: 15 mmol/L  Blood Urea Nitrogen: 14.6 mg/dL  Creatinine: 0.85 mg/dL  Glucose: 136 mg/dL  Calcium: 9.3 mg/dL  Protein Total: 6.7 g/dL  Albumin: 3.9 g/dL  Bilirubin Total: 0.4 mg/dL  Alkaline Phosphatase: 95 U/L  Aspartate Aminotransferase (AST/SGOT): 15 U/L  Alanine Aminotransferase (ALT/SGPT): 12 U/L  eGFR: 71    Prothrombin Time, Plasma: 10.9 sec (03.11.24 @ 10:45)   INR: 0.98, Prothrombin Time, Plasma: 10.9 sec (03.11.24 @ 10:45)     CT Head No Cont (03.11.24 @ 09:41)   IMPRESSION:  Limited exam due to motion    No acute intracranial hemorrhage or acute territorial infarct.  If   symptoms persist, follow-up MRI exam recommended.      CT Angio Head & Neck w/ IV Cont (03.11.24 @ 14:17)  IMPRESSION:    CT HEAD:  1.  No evidence of acute intracranial hemorrhage or midline shift.  2.  Chronic ischemic changes as discussed above.    CTA BRAIN:  1.  Redemonstrated aneurysmal change of the basilar tip, perhaps   minimally increased in size in the AP dimension when compared to prior   imaging.  2.  Mild blistering/fusiform aneurysmal change in the cavernous segment   of the left ICA.  3.Questionable small aneurysmal change at a left M2 bifurcation.    CTA NECK:  1.  Atherosclerotic calcification in the bilateral carotid bulbs,   resulting in severe narrowing of the proximal left ICA. Consider Doppler   ultrasound for further characterization of flow dynamics.  2.  Subcentimeter nodule in the left lung apex. Recommend follow-up   imaging as per Fleischner criteria.

## 2024-03-11 NOTE — ED PROVIDER NOTE - CLINICAL SUMMARY MEDICAL DECISION MAKING FREE TEXT BOX
75-year-old Swedish-speaking female with history of hypertension, high cholesterol, CAD  status post CABG and status post recent discharge from [facility] with diagnosis of 7 mm brain aneurysm presents to ED complaining of 2-day history of left posterior headache as well as left-sided posterior neck pain.  Patient complaining of feeling dizzy and weak with questionable blurred vision at times.   no reported vomiting or focal weakness. Patient was supposed to follow-up with neuro IR/Dr. Russell  as an outpatient but was unable to secondary to lack of insurance.   patient took over-the-counter Tylenol without relief.   patient seen by intake physician and sent for urgent noncontrast CT of the brain which is negative.  Case discussed with neuro IR/ Dr. Russell and requesting patient to have CTA of the brain and CTA of the neck.  Will check labs medicate for headache and reevaluate

## 2024-03-11 NOTE — ED ADULT NURSE NOTE - NS ED NURSE REPORT GIVEN DT
Caller: MARISOL JEFF    Relationship: Mother    Best call back number: [phone]     Requested Prescriptions:   Requested Prescriptions     Pending Prescriptions Disp Refills   • dexmethylphenidate XR (FOCALIN XR) 15 MG 24 hr capsule 30 capsule 0     Sig: Take 1 capsule by mouth Daily        Pharmacy where request should be sent: [id number] - [place], [place] - [address] [address] & [place] - [phone] [facility] [phone] FX     Additional details provided by patient: PATIENT IS NEEDING A REFILL.    Does the patient have less than a 3 day supply:  [x] Yes  [] No    Would you like a call back once the refill request has been completed: [x] Yes [] No    If the office needs to give you a call back, can they leave a voicemail: [x] Yes [] No    Sara Laureano Rep   03/02/23 11:14 EST           
Last Office Visit: 01/18/2023  Next Office Visit: 04/19/2023  Last Date Prescribed: 02/02/2023  Last Urine Drug Screen: 01/18/2023  Date of Signed Controlled Contract: 01/18/2023    
11-Mar-2024 19:28

## 2024-03-11 NOTE — ED STATDOCS - OBJECTIVE STATEMENT
78 year old hx of CAD s/p cabg, htn, and recently found to have 7 mm sacular aneurysm presents with headache. Pt was supposed ot follow up with neuro KIZZY Oconnell,  but due to insurance reasons has not. Now reports 2 days of headache and dizziness. Stat CT ordered, sent to [facility]

## 2024-03-11 NOTE — ED PROVIDER NOTE - PROGRESS NOTE DETAILS
CT/CTA results as noted.  Patient seen and evaluated by interventional neurology and patient to be admitted to service of Dr. Russell

## 2024-03-11 NOTE — H&P ADULT - NSHPPHYSICALEXAM_GEN_ALL_CORE
General: No acute distress.   HEENT: Head normocephalic, atraumatic. Conjunctivae clear w/o exudates or hemorrhage. Sclera non-icteric. Nares are patent bilaterally. Mucous membranes pink and moist.  No tonsillar swelling or exudates.    Neck: Supple, no adenopathy. Trachea midline. No JVD.  Cardiac: Normal rate and rhythm. S1, S2 auscultated. No murmurs, gallops, or rubs.     Respiratory: Lung sounds clear in all fields. Chest wall symmetric, nontender.   Abdominal: Soft, nondistended, nontender. Bowel sounds normoactive x 4 quadrants. No masses, hepatomegaly, or splenomegaly.   Skin: Skin is warm, dry and intact without rashes or lesions. Appropriate color for ethnicity. Nailbeds pink with no cyanosis or clubbing.   Extremities: No edema, 2+ peripheral pulses in b/l upper and b/l lower extremities. Full range of motion in all joints.       Detailed Neurologic Exam:    Mental status: The patient is awake and alert and has normal attention span. The patient is oriented to self, place, time, and situation. The patient is able to name objects, follow commands, repeat sentences.    Cranial nerves: Pupils equal and react symmetrically to light. There is no visual field deficit to confrontation. Extraocular motion is full with no nystagmus. There is no ptosis. Facial sensation is intact. Facial musculature is symmetric. Palate elevates symmetrically. Tongue is midline.    Motor: There is normal bulk and tone.  There is no tremor.  Strength is 5/5 in the right arm and leg.   Strength is 5/5 in the left arm and leg.    Sensation: Intact to light touch and pin in 4 extremities  Reflexes: 1+ throughout and plantar responses are flexor.  Cerebellar: There is no dysmetria on finger to nose testing.  Gait : deferred

## 2024-03-11 NOTE — H&P ADULT - ASSESSMENT
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 Patient was recently discharged from [facility] on 3/5/24 after admission for chief complaint of SOB, chest pain, and confusion; patient had cardiac workup done and basilar tip aneurysm was incidental finding on CT Head, better characterized on follow-up CTA and MRA. Neuro IR evaluated patient during prior admission, with plan for patient to follow-up and see Dr. Oconnell outpatient for diagnostic cerebral angiogram with plan for possible intervention if indicated. Patient did not follow-up outpatient due to insurance issues and now with headache and neck pain, patient came to ED for further evaluation. CTH/CTA 3/11/24 redemonstrated aneurysmal change of the basilar tip, perhaps minimally increased in size when compared to prior imaging as well as mild blistering/fusiform aneurysmal change in the cavernous segment of the left ICA, questionable small aneurysmal change at a left M2 bifurcation, and atherosclerotic calcification in the bilateral carotid bulbs, resulting in severe narrowing of the proximal left ICA.    Patient now with symptoms, in conjunction with imaging showing possible increase in basilar tip aneurysm size, as well as possible aneurysmal change in the Left ICA. Patient admitted for monitoring of neurological symptoms, diagnostic cerebral angiogram and possible intervention, and further workup and care.     NEURO:   -Neurologically patient remains intact, reporting headache and neck pain, NIH 0  -Continue close monitoring for neurologic deterioration    -Stroke neuro checks q  4 hours  -Permissive HTN, SBP goal 110-150, avoid hypotension and rapid fluctuations  -ANTITHROMBOTIC THERAPY: Continue home medications ASA 81 PO daily and clopidogrel 75mg PO daily  -STATIN THERAPY: Patient on home rosuvastatin 40mg PO at bedtime, will continue home medication or equivalent if unavailable  -Titrate statin to LDL goal less than 70  -Pending MRI Brain  -Plan for diagnostic cerebral angiogram inpatient  -Dysphagia screen: pass/fail   -Physical therapy/OT/Speech eval/treatment.     CARDIOVASCULAR:   -TTE performed prior admission 3/3/24 with EF 55-60%  -VS q 4 hours  -Cardiac monitoring w/ telemetry for now, further evaluation pending findings of noted workup  -Baseline 12-Lead ECG pending                 HEMATOLOGY:   -H/H 11.2/33.0 - slight normocytic anemia, will continue to trend  -Platelets 237  -Patient should have all age and risk appropriate malignancy screenings with PCP or sooner if clinically suspected   DVT ppx: Heparin s.c [] LMWH [x] SCDs [x]    PULMONARY:   -Patient protecting airway, saturating well   -Continue home COPD medications  -Maintain spO2 > 94%     RENAL/METABOLIC:   -BUN/Cr 14.6/0.85, GFR 71  -Monitor urine output, maintain adequate hydration    -Na Goal:  135-145  -Replenish lytes PRN     ID:   -Afebrile, no leukocytosis, monitor for si/sx of infection     OTHER: Condition and plan of care d/w patient and son at bedside, questions and concerns addressed.     DISPOSITION: Rehab or home depending on PT eval once stable and workup is complete    CORE MEASURES:        Admission NIHSS: 0     Tenecteplase : [] YES [x] NO      LDL/HDL/A1C: 37/72/5.2 (from prior admission 3/2/24)     Depression Screen- if depression hx and/or present      Statin Therapy: Atorvastatin 40mg      Dysphagia Screen: [] PASS [] FAIL [x]PENDING     Smoking [x] YES (Prior) [] NO      Afib [] YES [x] NO     Stroke Education [] YES [] NO [x]PENDING    Obtain screening lower extremity venous ultrasound in patients who meet 1 or more of the following criteria as patient is high risk for DVT/PE on admission:   [] History of DVT/PE  []Hypercoagulable states (Factor V Leiden, Cancer, OCP, etc. )  []Prolonged immobility (hemiplegia/hemiparesis/post operative or any other extended immobilization)  []Transferred from outside facility (Rehab or Long term care)  [] Age </= to 50

## 2024-03-12 LAB
ALBUMIN SERPL ELPH-MCNC: 3.8 G/DL — SIGNIFICANT CHANGE UP (ref 3.3–5.2)
ALP SERPL-CCNC: 91 U/L — SIGNIFICANT CHANGE UP (ref 40–120)
ALT FLD-CCNC: 11 U/L — SIGNIFICANT CHANGE UP
ANION GAP SERPL CALC-SCNC: 16 MMOL/L — SIGNIFICANT CHANGE UP (ref 5–17)
AST SERPL-CCNC: 14 U/L — SIGNIFICANT CHANGE UP
BILIRUB SERPL-MCNC: 0.5 MG/DL — SIGNIFICANT CHANGE UP (ref 0.4–2)
BUN SERPL-MCNC: 11.5 MG/DL — SIGNIFICANT CHANGE UP (ref 8–20)
CALCIUM SERPL-MCNC: 9.2 MG/DL — SIGNIFICANT CHANGE UP (ref 8.4–10.5)
CHLORIDE SERPL-SCNC: 97 MMOL/L — SIGNIFICANT CHANGE UP (ref 96–108)
CO2 SERPL-SCNC: 28 MMOL/L — SIGNIFICANT CHANGE UP (ref 22–29)
CREAT SERPL-MCNC: 0.92 MG/DL — SIGNIFICANT CHANGE UP (ref 0.5–1.3)
EGFR: 65 ML/MIN/1.73M2 — SIGNIFICANT CHANGE UP
GLUCOSE SERPL-MCNC: 115 MG/DL — HIGH (ref 70–99)
HCT VFR BLD CALC: 33.7 % — LOW (ref 34.5–45)
HGB BLD-MCNC: 11.3 G/DL — LOW (ref 11.5–15.5)
MAGNESIUM SERPL-MCNC: 2.2 MG/DL — SIGNIFICANT CHANGE UP (ref 1.6–2.6)
MCHC RBC-ENTMCNC: 31.7 PG — SIGNIFICANT CHANGE UP (ref 27–34)
MCHC RBC-ENTMCNC: 33.5 GM/DL — SIGNIFICANT CHANGE UP (ref 32–36)
MCV RBC AUTO: 94.7 FL — SIGNIFICANT CHANGE UP (ref 80–100)
PHOSPHATE SERPL-MCNC: 4.4 MG/DL — SIGNIFICANT CHANGE UP (ref 2.4–4.7)
PLATELET # BLD AUTO: 240 K/UL — SIGNIFICANT CHANGE UP (ref 150–400)
POTASSIUM SERPL-MCNC: 3.8 MMOL/L — SIGNIFICANT CHANGE UP (ref 3.5–5.3)
POTASSIUM SERPL-SCNC: 3.8 MMOL/L — SIGNIFICANT CHANGE UP (ref 3.5–5.3)
PROT SERPL-MCNC: 6.6 G/DL — SIGNIFICANT CHANGE UP (ref 6.6–8.7)
RBC # BLD: 3.56 M/UL — LOW (ref 3.8–5.2)
RBC # FLD: 11.9 % — SIGNIFICANT CHANGE UP (ref 10.3–14.5)
SODIUM SERPL-SCNC: 141 MMOL/L — SIGNIFICANT CHANGE UP (ref 135–145)
WBC # BLD: 8.3 K/UL — SIGNIFICANT CHANGE UP (ref 3.8–10.5)
WBC # FLD AUTO: 8.3 K/UL — SIGNIFICANT CHANGE UP (ref 3.8–10.5)

## 2024-03-12 PROCEDURE — 99233 SBSQ HOSP IP/OBS HIGH 50: CPT

## 2024-03-12 PROCEDURE — 93880 EXTRACRANIAL BILAT STUDY: CPT | Mod: 26

## 2024-03-12 RX ORDER — SODIUM CHLORIDE 9 MG/ML
500 INJECTION INTRAMUSCULAR; INTRAVENOUS; SUBCUTANEOUS ONCE
Refills: 0 | Status: COMPLETED | OUTPATIENT
Start: 2024-03-12 | End: 2024-03-12

## 2024-03-12 RX ADMIN — Medication 81 MILLIGRAM(S): at 11:09

## 2024-03-12 RX ADMIN — PANTOPRAZOLE SODIUM 40 MILLIGRAM(S): 20 TABLET, DELAYED RELEASE ORAL at 05:11

## 2024-03-12 RX ADMIN — TIOTROPIUM BROMIDE AND OLODATEROL 2 PUFF(S): 3.124; 2.736 SPRAY, METERED RESPIRATORY (INHALATION) at 20:57

## 2024-03-12 RX ADMIN — ALBUTEROL 2 PUFF(S): 90 AEROSOL, METERED ORAL at 12:20

## 2024-03-12 RX ADMIN — SODIUM CHLORIDE 500 MILLILITER(S): 9 INJECTION INTRAMUSCULAR; INTRAVENOUS; SUBCUTANEOUS at 08:48

## 2024-03-12 RX ADMIN — CLOPIDOGREL BISULFATE 75 MILLIGRAM(S): 75 TABLET, FILM COATED ORAL at 11:09

## 2024-03-12 RX ADMIN — ENOXAPARIN SODIUM 40 MILLIGRAM(S): 100 INJECTION SUBCUTANEOUS at 05:11

## 2024-03-12 NOTE — PROGRESS NOTE ADULT - ASSESSMENT
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 CT head without acute infarction or hemorrhage, CT angiogram brain re- demonstrated basilar tip aneurysm which was noted as slightly increased vs. prior exam, in addition there is noted left ICA aneurysmal dilation and a questionable left MCA M2 bifurcation aneurysmal change.  CT angiogram neck showed severe bilateral atherosclerotic disease; severe in left.     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     NEURO:   -Neurologically ---   -Continue close monitoring for neurologic deterioration    - Stroke neuro checks q 4 hours    - SBP goal 110-150mmHg avoid rapid fluctuations and hypotension; appears to be tolerating neurologically.    -ANTITHROMBOTIC THERAPY: on home DAPT regimen; [id number]/ Clopidogrel 75mg  once daily.   -titrate statin to LDL goal less than 70   -   -Dysphagia screen: pass/fail   -Physical therapy/OT/Speech eval/treatment.       -CARDIOVASCULAR: check TTE, cardiac monitoring w/ telemetry for now, further evaluation pending findings of noted workup                              -HEMATOLOGY: H/H _, Platelets __, patient should have all age and risk appropriate malignancy screenings with PCP or sooner if clinically suspected      DVT ppx: Heparin s.c [] LMWH []     PULMONARY: CXR ___ , protecting airway, saturating well , outpatient follow up for incidentally noted lung nodules     RENAL: BUN/Cr _, monitor urine output, maintain adequate hydration       Na Goal:  135-145     Garcia:    ID: afebrile, no leukocytosis, monitor for si/sx of infection     OTHER:  condition and plan of care d/w patient, questions and concerns addressed.     DISPOSITION: Rehab or home depending on PT eval once stable and workup is complete      CORE MEASURES:        Admission NIHSS:      Tenecteplase : [] YES [] NO      LDL/HDL/A1C:     Depression Screen- if depression hx and/or present      Statin Therapy:     Dysphagia Screen: [] PASS [] FAIL     Smoking [] YES [] NO      Afib [] YES [] NO     Stroke Education [] YES [] NO    Obtain screening lower extremity venous ultrasound in patients who meet 1 or more of the following criteria as patient is high risk for DVT/PE on admission:   [] History of DVT/PE  []Hypercoagulable states (Factor V Leiden, Cancer, OCP, etc. 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 CT head without acute infarction or hemorrhage, CT angiogram brain re- demonstrated basilar tip aneurysm which was noted as slightly increased vs. prior exam, in addition there is noted left ICA aneurysmal dilation and a questionable left MCA M2 bifurcation aneurysmal change.  CT angiogram neck showed severe bilateral atherosclerotic disease; severe in left.     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     NEURO:   -Neurologically without acute change   -Continue close monitoring for neurologic deterioration    - Stroke neuro checks q 4 hours    - SBP goal 110-150mmHg avoid rapid fluctuations and hypotension; appears to be tolerating neurologically.    -ANTITHROMBOTIC THERAPY: on home DAPT regimen; [id number]/ Clopidogrel 75mg  once daily.   -titrate statin to LDL goal less than 70   - carotid duplex  -cerebral angiogram 3/13/24  -Dysphagia screen, if passed, advance as tolerated per protocol   -Physical therapy/OT/Speech eval/treatment.     -CARDIOVASCULAR: check TTE, cardiac monitoring w/ telemetry for now, further evaluation pending findings of noted workup                              -HEMATOLOGY: H/H with anemia, no acute change, Platelets 240, patient should have all age and risk appropriate malignancy screenings with PCP or sooner if clinically suspected      DVT ppx: Heparin s.c [] LMWH []     PULMONARY: CXR possible right lower lobe opacity, protecting airway, saturating well , outpatient follow up for incidentally noted lung nodules , resume home COPD regimen     RENAL: BUN/Cr within range, monitor urine output, maintain adequate hydration       Na Goal:  135-145    ID: afebrile, no leukocytosis, monitor for si/sx of infection     OTHER:  condition and plan of care d/w patient and family, questions and concerns addressed.     DISPOSITION: Rehab or home depending on PT eval once stable and workup is complete      CORE MEASURES:        Admission NIHSS: 1     Tenecteplase : [] YES [x] NO      LDL/HDL/A1C: 37 /5.2     Depression Screen- if depression hx and/or present      Statin Therapy: as noted      Dysphagia Screen: [] PASS [] FAIL     Smoking [] YES [x] NO      Afib [] YES [x] NO     Stroke Education [] YES [] NO pending     Obtain screening lower extremity venous ultrasound in patients who meet 1 or more of the following criteria as patient is high risk for DVT/PE on admission:   [] History of DVT/PE  []Hypercoagulable states (Factor V Leiden, Cancer, OCP, etc. 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 CT head without acute infarction or hemorrhage, CT angiogram brain re- demonstrated basilar tip aneurysm which was noted as slightly increased vs. prior exam, in addition there is noted left ICA aneurysmal dilation and a questionable left MCA M2 bifurcation aneurysmal change.  CT angiogram neck showed severe bilateral atherosclerotic disease; severe in left.     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     NEURO:   -Neurologically without acute change   -Continue close monitoring for neurologic deterioration    - Stroke neuro checks q 4 hours    - SBP goal 110-150mmHg avoid rapid fluctuations and hypotension; appears to be tolerating neurologically.    -ANTITHROMBOTIC THERAPY: on home DAPT regimen; [id number]/ Clopidogrel 75mg  once daily.   -titrate statin to LDL goal less than 70   - carotid duplex  -cerebral angiogram 3/13/24  -Dysphagia screen, if passed, advance as tolerated per protocol   -Physical therapy/OT/Speech eval/treatment.     -CARDIOVASCULAR: check TTE, cardiac monitoring w/ telemetry for now, further evaluation pending findings of noted workup                              -HEMATOLOGY: H/H with anemia, no acute change, Platelets 240, patient should have all age and risk appropriate malignancy screenings with PCP or sooner if clinically suspected      DVT ppx: Heparin s.c [] LMWH []     PULMONARY: CXR possible right lower lobe opacity, protecting airway, saturating well , outpatient follow up for incidentally noted lung nodules , resume home COPD regimen     RENAL: BUN/Cr within range, monitor urine output, maintain adequate hydration       Na Goal:  135-145    ID: afebrile, no leukocytosis, monitor for si/sx of infection     OTHER:  condition and plan of care d/w patient and family, questions and concerns addressed.     DISPOSITION: Rehab or home depending on PT eval once stable and workup is complete      CORE MEASURES:        Admission NIHSS: 1     Tenecteplase : [] YES [x] NO      LDL/HDL/A1C: 37 /5.2     Depression Screen- if depression hx and/or present      Statin Therapy: as noted      Dysphagia Screen: [] PASS [] FAIL     Smoking [] YES [x] NO      Afib [] YES [x] NO     Stroke Education [] YES [] NO pending     Obtain screening lower extremity venous ultrasound in patients who meet 1 or more of the following criteria as patient is high risk for DVT/PE on admission:   [] History of DVT/PE  []Hypercoagulable states (Factor V Leiden, Cancer, OCP, etc. )  []Prolonged immobility (hemiplegia/hemiparesis/post operative or any other extended immobilization)  [] Transferred from outside facility (Rehab or Long term care)  [] Age </= to 50

## 2024-03-12 NOTE — PHYSICAL THERAPY INITIAL EVALUATION ADULT - PERTINENT HX OF CURRENT PROBLEM, REHAB EVAL
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 Patient was recently discharged from [facility] on 3/5/24 after admission for chief complaint of SOB, chest pain, and confusion; patient had cardiac workup done and basilar tip aneurysm was incidental finding on CT Head, better characterized on follow-up CTA and MRA. Neuro IR evaluated patient during prior admission, with plan for patient to follow-up and see Dr. Oconnell, plan for likely outpatient for diagnostic cerebral angiogram with plan for possible intervention if indicated. Patient did not follow-up outpatient due to insurance issues and now with headache and neck pain, patient came to ED for further evaluation. Patient admitted to Stroke Neurology service for further care.

## 2024-03-12 NOTE — PROGRESS NOTE ADULT - SUBJECTIVE AND OBJECTIVE BOX
Preliminary note, official recommendations pending attending review/signature   Seen and examined by Stroke team attending/team, assessment/ plan as discussed with stroke team attending/team as noted.     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 Patient was recently discharged from [facility] on 3/5/24 after admission for chief complaint of SOB, chest pain, and confusion; patient had cardiac workup done and basilar tip aneurysm was incidental finding on CT Head, better characterized on follow-up CTA and MRA. Neuro IR evaluated patient during prior admission, with plan for patient to follow-up and see Dr. Oconnell, plan for likely outpatient for diagnostic cerebral angiogram with plan for possible intervention if indicated. Patient did not follow-up outpatient due to insurance issues and now with headache and neck pain, patient came to ED for further evaluation. Patient admitted to Stroke Neurology service for further care.    SUBJECTIVE: No events overnight.  No new neurologic complaints.  ROS reported negative unless otherwise noted.    albuterol    90 MICROgram(s) HFA Inhaler 2 Puff(s) Inhalation every 6 hours PRN  aspirin  chewable 81 milliGRAM(s) Oral daily  clopidogrel Tablet 75 milliGRAM(s) Oral daily  enoxaparin Injectable 40 milliGRAM(s) SubCutaneous every 24 hours  pantoprazole    Tablet 40 milliGRAM(s) Oral before breakfast  sodium chloride 0.9%. 1000 milliLiter(s) IV Continuous <Continuous>  tiotropium 2.5 MICROgram(s)/olodaterol 2.5 MICROgram(s) (STIOLTO) Inhaler 2 Puff(s) Inhalation two times a day      PHYSICAL EXAM:   Vital Signs Last 24 Hrs  T(C): 37 (12 Mar 2024 08:16), Max: 37 (12 Mar 2024 08:16)  T(F): 98.6 (12 Mar 2024 08:16), Max: 98.6 (12 Mar 2024 08:16)  HR: 63 (12 Mar 2024 08:16) (58 - 80)  BP: 108/68 (12 Mar 2024 08:16) (101/53 - 134/74)  BP(mean): --  RR: 17 (12 Mar 2024 08:16) (17 - 20)  SpO2: 95% (12 Mar 2024 08:16) (95% - 98%)    Parameters below as of 12 Mar 2024 04:11  Patient On (Oxygen Delivery Method): room air    EXAM PENDING     General: No acute distress.   HEENT: Head normocephalic, atraumatic. Conjunctivae clear w/o exudates or hemorrhage. Sclera non-icteric. Nares are patent bilaterally. Mucous membranes pink and moist.  No tonsillar swelling or exudates.    Neck: Supple, no adenopathy. Trachea midline. No JVD.  Cardiac: Normal rate and rhythm. S1, S2 auscultated. No murmurs, gallops, or rubs.     Respiratory: Lung sounds clear in all fields. Chest wall symmetric, nontender.   Abdominal: Soft, nondistended, nontender. Bowel sounds normoactive x 4 quadrants. No masses, hepatomegaly, or splenomegaly.   Skin: Skin is warm, dry and intact without rashes or lesions. Appropriate color for ethnicity. Nailbeds pink with no cyanosis or clubbing.   Extremities: No edema, 2+ peripheral pulses in b/l upper and b/l lower extremities. Full range of motion in all joints.     NEUROLOGICAL EXAM:  Mental status: Awake, alert, oriented x3, speech fluent, follows commands, no neglect, normal memory   Cranial Nerves: No facial asymmetry, no nystagmus, no dysarthria,  tongue midline  Motor exam: Normal tone, no drift, 5/5 RUE, 5/5 RLE, 5/5 LUE, 5/5 LLE, normal fine finger movements.  Sensation: Intact to light touch   Coordination/ Gait: No dysmetria, gait not tested    LABS:                        11.3   8.30  )-----------( 240      ( 12 Mar 2024 05:26 )             33.7    03-12    141  |  97  |  11.5  ----------------------------<  115<H>  3.8   |  28.0  |  0.92    Ca    9.2      12 Mar 2024 05:26  Phos  4.4     03-12  Mg     2.2     03-12    TPro  6.6  /  Alb  3.8  /  TBili  0.5  /  DBili  x   /  AST  14  /  ALT  11  /  AlkPhos  91  03-12  PT/INR - ( 11 Mar 2024 10:45 )   PT: 10.9 sec;   INR: 0.98 ratio         PTT - ( 11 Mar 2024 10:45 )  PTT:34.2 sec      03-05 Chol 119 LDL -37 - HDL 47<L> Trig 176<H>    A1C: 5.2    IMAGING: Reviewed by me.     CT Head No Cont (03.11.24 @ 09:41)   IMPRESSION:  Limited exam due to motion  No acute intracranial hemorrhage or acute territorial infarct.  If   symptoms persist, follow-up MRI exam recommended.       (03.11.24 @ 14:17)  IMPRESSION:    CT HEAD:  1.  No evidence of acute intracranial hemorrhage or midline shift.  2.  Chronic ischemic changes as discussed above.    CTA BRAIN:  1.  Redemonstrated aneurysmal change of the basilar tip, perhaps   minimally increased in size in the AP dimension when compared to prior   imaging.  2.  Mild blistering/fusiform aneurysmal change in the cavernous segment   of the left ICA.  3.Questionable small aneurysmal change at a left M2 bifurcation.    CTA NECK:  1.  Atherosclerotic calcification in the bilateral carotid bulbs,   resulting in severe narrowing of the proximal left ICA. Consider Doppler   ultrasound for further characterization of flow dynamics.  2.  Subcentimeter nodule in the left lung apex. Recommend follow-up   imaging as per Fleischner criteria.   Preliminary note, official recommendations pending attending review/signature   Seen and examined by Stroke team attending/team, assessment/ plan as discussed with stroke team attending/team as noted.     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 Patient was recently discharged from [facility] on 3/5/24 after admission for chief complaint of SOB, chest pain, and confusion; patient had cardiac workup done and basilar tip aneurysm was incidental finding on CT Head, better characterized on follow-up CTA and MRA. Neuro IR evaluated patient during prior admission, with plan for patient to follow-up and see Dr. Oconnell, plan for likely outpatient for diagnostic cerebral angiogram with plan for possible intervention if indicated. Patient did not follow-up outpatient due to insurance issues and now with headache and neck pain, patient came to ED for further evaluation. Patient admitted to Stroke Neurology service for further care.    SUBJECTIVE: No events overnight.  No new neurologic complaints.  ROS reported negative unless otherwise noted.    albuterol    90 MICROgram(s) HFA Inhaler 2 Puff(s) Inhalation every 6 hours PRN  aspirin  chewable 81 milliGRAM(s) Oral daily  clopidogrel Tablet 75 milliGRAM(s) Oral daily  enoxaparin Injectable 40 milliGRAM(s) SubCutaneous every 24 hours  pantoprazole    Tablet 40 milliGRAM(s) Oral before breakfast  sodium chloride 0.9%. 1000 milliLiter(s) IV Continuous <Continuous>  tiotropium 2.5 MICROgram(s)/olodaterol 2.5 MICROgram(s) (STIOLTO) Inhaler 2 Puff(s) Inhalation two times a day      PHYSICAL EXAM:   Vital Signs Last 24 Hrs  T(C): 37 (12 Mar 2024 08:16), Max: 37 (12 Mar 2024 08:16)  T(F): 98.6 (12 Mar 2024 08:16), Max: 98.6 (12 Mar 2024 08:16)  HR: 63 (12 Mar 2024 08:16) (58 - 80)  BP: 108/68 (12 Mar 2024 08:16) (101/53 - 134/74)  BP(mean): --  RR: 17 (12 Mar 2024 08:16) (17 - 20)  SpO2: 95% (12 Mar 2024 08:16) (95% - 98%)    Parameters below as of 12 Mar 2024 04:11  Patient On (Oxygen Delivery Method): room air    EXAM PENDING     General: No acute distress.   HEENT: Head normocephalic, atraumatic. Conjunctivae clear w/o exudates or hemorrhage. Sclera non-icteric. Nares are patent bilaterally. Mucous membranes pink and moist.  No tonsillar swelling or exudates.    Neck: Supple, no adenopathy. Trachea midline. No JVD.  Cardiac: Normal rate and rhythm. S1, S2 auscultated. No murmurs, gallops, or rubs.     Respiratory: Lung sounds clear in all fields. Chest wall symmetric, nontender.   Abdominal: Soft, nondistended, nontender. Bowel sounds normoactive x 4 quadrants. No masses, hepatomegaly, or splenomegaly.   Skin: Skin is warm, dry and intact without rashes or lesions. Appropriate color for ethnicity. Nailbeds pink with no cyanosis or clubbing.   Extremities: No edema, 2+ peripheral pulses in b/l upper and b/l lower extremities. Full range of motion in all joints.     NEUROLOGICAL EXAM:  Mental status: Awake, alert, oriented x3, speech fluent, follows commands, no neglect, normal memory   Cranial Nerves: No facial asymmetry, no nystagmus, no dysarthria,  tongue midline  Motor exam: Normal tone, no drift, 5/5 RUE, 5/5 RLE, 5/5 LUE, 5/5 LLE, normal fine finger movements.  Sensation: Intact to light touch   Coordination/ Gait: No dysmetria, gait not tested    LABS:                        11.3   8.30  )-----------( 240      ( 12 Mar 2024 05:26 )             33.7    03-12    141  |  97  |  11.5  ----------------------------<  115<H>  3.8   |  28.0  |  0.92    Ca    9.2      12 Mar 2024 05:26  Phos  4.4     03-12  Mg     2.2     03-12    TPro  6.6  /  Alb  3.8  /  TBili  0.5  /  DBili  x   /  AST  14  /  ALT  11  /  AlkPhos  91  03-12  PT/INR - ( 11 Mar 2024 10:45 )   PT: 10.9 sec;   INR: 0.98 ratio         PTT - ( 11 Mar 2024 10:45 )  PTT:34.2 sec      03-05 Chol 119 LDL -37 - HDL 47<L> Trig 176<H>    A1C: 5.2    IMAGING: Reviewed by me.     CT Head No Cont (03.11.24 @ 09:41)   IMPRESSION:  Limited exam due to motion  No acute intracranial hemorrhage or acute territorial infarct.  If   symptoms persist, follow-up MRI exam recommended.       (03.11.24 @ 14:17)  IMPRESSION:  MR Head No Cont (03.11.24 @ 19:22)   Chronic left external capsule hemosiderin. Chronic bilateral   microhemorrhages.    Basilar tip aneurysm is better demonstrated on prior MRA brain.     MR Angio Head. Neck w/ IV Cont (03.04.24 @ 12:19)  1.  RIGHT CAROTID NECK CIRCULATION:   Intact.  2.  LEFT CAROTID NECK CIRCULATION:    Intact.  3.  VERTEBRAL NECK CIRCULATION:   Intact  4.  ANTERIOR INTRACRANIAL CIRCULATION:     Intact.  5.  POSTERIOR INTRACRANIAL CIRCULATION:   Complex appearance of the   basilar tip suggesting centrally thrombosed thrombosed and peripherally   patent saccular aneurysm. Note that vascular developmental duplication   and fenestration could have a similar appearance. Consider alternate   imaging modality gadolinium enhanced MR angiography or catheter   angiography    CT HEAD:  1.  No evidence of acute intracranial hemorrhage or midline shift.  2.  Chronic ischemic changes as discussed above.    CTA BRAIN:  1.  Redemonstrated aneurysmal change of the basilar tip, perhaps   minimally increased in size in the AP dimension when compared to prior   imaging.  2.  Mild blistering/fusiform aneurysmal change in the cavernous segment   of the left ICA.  3.Questionable small aneurysmal change at a left M2 bifurcation.    CTA NECK:  1.  Atherosclerotic calcification in the bilateral carotid bulbs,   resulting in severe narrowing of the proximal left ICA. Consider Doppler   ultrasound for further characterization of flow dynamics.  2.  Subcentimeter nodule in the left lung apex. Recommend follow-up   imaging as per Fleischner criteria.   Preliminary note, official recommendations pending attending review/signature   Seen and examined by Stroke team attending/team, assessment/ plan as discussed with stroke team attending/team as noted.   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 Patient was recently discharged from [facility] on 3/5/24 after admission for chief complaint of SOB, chest pain, and confusion; patient had cardiac workup done and basilar tip aneurysm was incidental finding on CT Head, better characterized on follow-up CTA and MRA. Neuro IR evaluated patient during prior admission, with plan for patient to follow-up and see Dr. Oconnell, plan for likely outpatient for diagnostic cerebral angiogram with plan for possible intervention if indicated. Patient did not follow-up outpatient due to insurance issues and now with headache and neck pain, patient came to ED for further evaluation. Patient admitted to Stroke Neurology service for further care.    SUBJECTIVE: No events overnight.  No new neurologic complaints.  ROS reported negative unless otherwise noted.    albuterol    90 MICROgram(s) HFA Inhaler 2 Puff(s) Inhalation every 6 hours PRN  aspirin  chewable 81 milliGRAM(s) Oral daily  clopidogrel Tablet 75 milliGRAM(s) Oral daily  enoxaparin Injectable 40 milliGRAM(s) SubCutaneous every 24 hours  pantoprazole    Tablet 40 milliGRAM(s) Oral before breakfast  sodium chloride 0.9%. 1000 milliLiter(s) IV Continuous <Continuous>  tiotropium 2.5 MICROgram(s)/olodaterol 2.5 MICROgram(s) (STIOLTO) Inhaler 2 Puff(s) Inhalation two times a day      PHYSICAL EXAM:   Vital Signs Last 24 Hrs  T(C): 37 (12 Mar 2024 08:16), Max: 37 (12 Mar 2024 08:16)  T(F): 98.6 (12 Mar 2024 08:16), Max: 98.6 (12 Mar 2024 08:16)  HR: 63 (12 Mar 2024 08:16) (58 - 80)  BP: 108/68 (12 Mar 2024 08:16) (101/53 - 134/74)  BP(mean): --  RR: 17 (12 Mar 2024 08:16) (17 - 20)  SpO2: 95% (12 Mar 2024 08:16) (95% - 98%)    Parameters below as of 12 Mar 2024 04:11  Patient On (Oxygen Delivery Method): room air      General: No acute distress.   HEENT: Head normocephalic, atraumatic. Conjunctivae clear w/o exudates or hemorrhage. Sclera non-icteric. Nares are patent bilaterally. Mucous membranes pink and moist.  No tonsillar swelling or exudates.    Neck: Supple, no adenopathy. Trachea midline. No JVD.  Cardiac: Normal rate and rhythm. S1, S2 auscultated. No murmurs, gallops, or rubs.     Respiratory: Lung sounds clear in all fields. Chest wall symmetric, nontender.   Abdominal: Soft, nondistended, nontender. Bowel sounds normoactive x 4 quadrants. No masses, hepatomegaly, or splenomegaly.   Skin: Skin is warm, dry and intact without rashes or lesions. Appropriate color for ethnicity. Nailbeds pink with no cyanosis or clubbing.   Extremities: No edema, 2+ peripheral pulses in b/l upper and b/l lower extremities. Full range of motion in all joints.     NEUROLOGICAL EXAM:  Mental status: Awake, alert, oriented x person, place, speech fluent, follows commands, no neglect, normal memory   Cranial Nerves: No facial asymmetry, no nystagmus, no dysarthria,  tongue midline  Motor exam: Normal tone, no drift, 5/5 RUE, 5/5 RLE, 5/5 LUE, 5/5 LLE, normal fine finger movements.  Sensation: Intact to light touch   Coordination/ Gait: No dysmetria, gait not tested    LABS:                        11.3   8.30  )-----------( 240      ( 12 Mar 2024 05:26 )             33.7    03-12    141  |  97  |  11.5  ----------------------------<  115<H>  3.8   |  28.0  |  0.92    Ca    9.2      12 Mar 2024 05:26  Phos  4.4     03-12  Mg     2.2     03-12    TPro  6.6  /  Alb  3.8  /  TBili  0.5  /  DBili  x   /  AST  14  /  ALT  11  /  AlkPhos  91  03-12  PT/INR - ( 11 Mar 2024 10:45 )   PT: 10.9 sec;   INR: 0.98 ratio         PTT - ( 11 Mar 2024 10:45 )  PTT:34.2 sec      03-05 Chol 119 LDL -37 - HDL 47<L> Trig 176<H>    A1C: 5.2    IMAGING: Reviewed by me.     CT Head No Cont (03.11.24 @ 09:41)   IMPRESSION:  Limited exam due to motion  No acute intracranial hemorrhage or acute territorial infarct.  If   symptoms persist, follow-up MRI exam recommended.       (03.11.24 @ 14:17)  IMPRESSION:  MR Head No Cont (03.11.24 @ 19:22)   Chronic left external capsule hemosiderin. Chronic bilateral   microhemorrhages.    Basilar tip aneurysm is better demonstrated on prior MRA brain.     MR Angio Head. Neck w/ IV Cont (03.04.24 @ 12:19)  1.  RIGHT CAROTID NECK CIRCULATION:   Intact.  2.  LEFT CAROTID NECK CIRCULATION:    Intact.  3.  VERTEBRAL NECK CIRCULATION:   Intact  4.  ANTERIOR INTRACRANIAL CIRCULATION:     Intact.  5.  POSTERIOR INTRACRANIAL CIRCULATION:   Complex appearance of the   basilar tip suggesting centrally thrombosed thrombosed and peripherally   patent saccular aneurysm. Note that vascular developmental duplication   and fenestration could have a similar appearance. Consider alternate   imaging modality gadolinium enhanced MR angiography or catheter   angiography    CT HEAD:  1.  No evidence of acute intracranial hemorrhage or midline shift.  2.  Chronic ischemic changes as discussed above.    CTA BRAIN:  1.  Redemonstrated aneurysmal change of the basilar tip, perhaps   minimally increased in size in the AP dimension when compared to prior   imaging.  2.  Mild blistering/fusiform aneurysmal change in the cavernous segment   of the left ICA.  3.Questionable small aneurysmal change at a left M2 bifurcation.    CTA NECK:  1.  Atherosclerotic calcification in the bilateral carotid bulbs,   resulting in severe narrowing of the proximal left ICA. Consider Doppler   ultrasound for further characterization of flow dynamics.  2.  Subcentimeter nodule in the left lung apex. Recommend follow-up   imaging as per Fleischner criteria.   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 Patient was recently discharged from [facility] on 3/5/24 after admission for chief complaint of SOB, chest pain, and confusion; patient had cardiac workup done and basilar tip aneurysm was incidental finding on CT Head, better characterized on follow-up CTA and MRA. Neuro IR evaluated patient during prior admission, with plan for patient to follow-up and see Dr. Oconnell, plan for likely outpatient for diagnostic cerebral angiogram with plan for possible intervention if indicated. Patient did not follow-up outpatient due to insurance issues and now with headache and neck pain, patient came to ED for further evaluation. Patient admitted to Stroke Neurology service for further care.    SUBJECTIVE: No events overnight.  No new neurologic complaints.  ROS reported negative unless otherwise noted.    albuterol    90 MICROgram(s) HFA Inhaler 2 Puff(s) Inhalation every 6 hours PRN  aspirin  chewable 81 milliGRAM(s) Oral daily  clopidogrel Tablet 75 milliGRAM(s) Oral daily  enoxaparin Injectable 40 milliGRAM(s) SubCutaneous every 24 hours  pantoprazole    Tablet 40 milliGRAM(s) Oral before breakfast  sodium chloride 0.9%. 1000 milliLiter(s) IV Continuous <Continuous>  tiotropium 2.5 MICROgram(s)/olodaterol 2.5 MICROgram(s) (STIOLTO) Inhaler 2 Puff(s) Inhalation two times a day      PHYSICAL EXAM:   Vital Signs Last 24 Hrs  T(C): 37 (12 Mar 2024 08:16), Max: 37 (12 Mar 2024 08:16)  T(F): 98.6 (12 Mar 2024 08:16), Max: 98.6 (12 Mar 2024 08:16)  HR: 63 (12 Mar 2024 08:16) (58 - 80)  BP: 108/68 (12 Mar 2024 08:16) (101/53 - 134/74)  BP(mean): --  RR: 17 (12 Mar 2024 08:16) (17 - 20)  SpO2: 95% (12 Mar 2024 08:16) (95% - 98%)    Parameters below as of 12 Mar 2024 04:11  Patient On (Oxygen Delivery Method): room air      General: No acute distress.   HEENT: Head normocephalic, atraumatic. Conjunctivae clear w/o exudates or hemorrhage. Sclera non-icteric. Nares are patent bilaterally. Mucous membranes pink and moist.  No tonsillar swelling or exudates.    Neck: Supple, no adenopathy. Trachea midline. No JVD.  Cardiac: Normal rate and rhythm. S1, S2 auscultated. No murmurs, gallops, or rubs.     Respiratory: Lung sounds clear in all fields. Chest wall symmetric, nontender.   Abdominal: Soft, nondistended, nontender. Bowel sounds normoactive x 4 quadrants. No masses, hepatomegaly, or splenomegaly.   Skin: Skin is warm, dry and intact without rashes or lesions. Appropriate color for ethnicity. Nailbeds pink with no cyanosis or clubbing.   Extremities: No edema, 2+ peripheral pulses in b/l upper and b/l lower extremities. Full range of motion in all joints.     NEUROLOGICAL EXAM:  Mental status: Awake, alert, oriented x person, place, speech fluent, follows commands, no neglect, normal memory   Cranial Nerves: No facial asymmetry, no nystagmus, no dysarthria,  tongue midline  Motor exam: Normal tone, no drift, 5/5 RUE, 5/5 RLE, 5/5 LUE, 5/5 LLE, normal fine finger movements.  Sensation: Intact to light touch   Coordination/ Gait: No dysmetria, gait not tested    LABS:                        11.3   8.30  )-----------( 240      ( 12 Mar 2024 05:26 )             33.7    03-12    141  |  97  |  11.5  ----------------------------<  115<H>  3.8   |  28.0  |  0.92    Ca    9.2      12 Mar 2024 05:26  Phos  4.4     03-12  Mg     2.2     03-12    TPro  6.6  /  Alb  3.8  /  TBili  0.5  /  DBili  x   /  AST  14  /  ALT  11  /  AlkPhos  91  03-12  PT/INR - ( 11 Mar 2024 10:45 )   PT: 10.9 sec;   INR: 0.98 ratio         PTT - ( 11 Mar 2024 10:45 )  PTT:34.2 sec      03-05 Chol 119 LDL -37 - HDL 47<L> Trig 176<H>    A1C: 5.2    IMAGING: Reviewed by me.     CT Head No Cont (03.11.24 @ 09:41)   IMPRESSION:  Limited exam due to motion  No acute intracranial hemorrhage or acute territorial infarct.  If   symptoms persist, follow-up MRI exam recommended.       (03.11.24 @ 14:17)  IMPRESSION:  MR Head No Cont (03.11.24 @ 19:22)   Chronic left external capsule hemosiderin. Chronic bilateral   microhemorrhages.    Basilar tip aneurysm is better demonstrated on prior MRA brain.     MR Angio Head. Neck w/ IV Cont (03.04.24 @ 12:19)  1.  RIGHT CAROTID NECK CIRCULATION:   Intact.  2.  LEFT CAROTID NECK CIRCULATION:    Intact.  3.  VERTEBRAL NECK CIRCULATION:   Intact  4.  ANTERIOR INTRACRANIAL CIRCULATION:     Intact.  5.  POSTERIOR INTRACRANIAL CIRCULATION:   Complex appearance of the   basilar tip suggesting centrally thrombosed thrombosed and peripherally   patent saccular aneurysm. Note that vascular developmental duplication   and fenestration could have a similar appearance. Consider alternate   imaging modality gadolinium enhanced MR angiography or catheter   angiography    CT HEAD:  1.  No evidence of acute intracranial hemorrhage or midline shift.  2.  Chronic ischemic changes as discussed above.    CTA BRAIN:  1.  Redemonstrated aneurysmal change of the basilar tip, perhaps   minimally increased in size in the AP dimension when compared to prior   imaging.  2.  Mild blistering/fusiform aneurysmal change in the cavernous segment   of the left ICA.  3.Questionable small aneurysmal change at a left M2 bifurcation.    CTA NECK:  1.  Atherosclerotic calcification in the bilateral carotid bulbs,   resulting in severe narrowing of the proximal left ICA. Consider Doppler   ultrasound for further characterization of flow dynamics.  2.  Subcentimeter nodule in the left lung apex. Recommend follow-up   imaging as per Fleischner criteria.

## 2024-03-12 NOTE — PHYSICAL THERAPY INITIAL EVALUATION ADULT - ADDITIONAL COMMENTS
pt lives with her daughter. son also there at this time. they are in a house with 2 + 4 steps to enter and 6 inside, all with rails. no DME.

## 2024-03-13 LAB
ALBUMIN SERPL ELPH-MCNC: 3.9 G/DL — SIGNIFICANT CHANGE UP (ref 3.3–5.2)
ALP SERPL-CCNC: 92 U/L — SIGNIFICANT CHANGE UP (ref 40–120)
ALT FLD-CCNC: 10 U/L — SIGNIFICANT CHANGE UP
ANION GAP SERPL CALC-SCNC: 13 MMOL/L — SIGNIFICANT CHANGE UP (ref 5–17)
APTT BLD: 35.8 SEC — HIGH (ref 24.5–35.6)
AST SERPL-CCNC: 14 U/L — SIGNIFICANT CHANGE UP
BILIRUB SERPL-MCNC: 0.5 MG/DL — SIGNIFICANT CHANGE UP (ref 0.4–2)
BUN SERPL-MCNC: 12.5 MG/DL — SIGNIFICANT CHANGE UP (ref 8–20)
CALCIUM SERPL-MCNC: 9 MG/DL — SIGNIFICANT CHANGE UP (ref 8.4–10.5)
CHLORIDE SERPL-SCNC: 99 MMOL/L — SIGNIFICANT CHANGE UP (ref 96–108)
CO2 SERPL-SCNC: 29 MMOL/L — SIGNIFICANT CHANGE UP (ref 22–29)
CREAT SERPL-MCNC: 0.76 MG/DL — SIGNIFICANT CHANGE UP (ref 0.5–1.3)
EGFR: 81 ML/MIN/1.73M2 — SIGNIFICANT CHANGE UP
GLUCOSE SERPL-MCNC: 116 MG/DL — HIGH (ref 70–99)
HCT VFR BLD CALC: 33.1 % — LOW (ref 34.5–45)
HGB BLD-MCNC: 11 G/DL — LOW (ref 11.5–15.5)
INR BLD: 0.99 RATIO — SIGNIFICANT CHANGE UP (ref 0.85–1.18)
MCHC RBC-ENTMCNC: 31.6 PG — SIGNIFICANT CHANGE UP (ref 27–34)
MCHC RBC-ENTMCNC: 33.2 GM/DL — SIGNIFICANT CHANGE UP (ref 32–36)
MCV RBC AUTO: 95.1 FL — SIGNIFICANT CHANGE UP (ref 80–100)
PA ADP PRP-ACNC: 247 PRU — SIGNIFICANT CHANGE UP (ref 180–376)
PLATELET # BLD AUTO: 226 K/UL — SIGNIFICANT CHANGE UP (ref 150–400)
POTASSIUM SERPL-MCNC: 4 MMOL/L — SIGNIFICANT CHANGE UP (ref 3.5–5.3)
POTASSIUM SERPL-SCNC: 4 MMOL/L — SIGNIFICANT CHANGE UP (ref 3.5–5.3)
PROT SERPL-MCNC: 6.6 G/DL — SIGNIFICANT CHANGE UP (ref 6.6–8.7)
PROTHROM AB SERPL-ACNC: 11 SEC — SIGNIFICANT CHANGE UP (ref 9.5–13)
RBC # BLD: 3.48 M/UL — LOW (ref 3.8–5.2)
RBC # FLD: 11.9 % — SIGNIFICANT CHANGE UP (ref 10.3–14.5)
SODIUM SERPL-SCNC: 141 MMOL/L — SIGNIFICANT CHANGE UP (ref 135–145)
WBC # BLD: 7.43 K/UL — SIGNIFICANT CHANGE UP (ref 3.8–10.5)
WBC # FLD AUTO: 7.43 K/UL — SIGNIFICANT CHANGE UP (ref 3.8–10.5)

## 2024-03-13 PROCEDURE — 99233 SBSQ HOSP IP/OBS HIGH 50: CPT

## 2024-03-13 PROCEDURE — 71046 X-RAY EXAM CHEST 2 VIEWS: CPT | Mod: 26

## 2024-03-13 RX ORDER — ACETAMINOPHEN 500 MG
1000 TABLET ORAL ONCE
Refills: 0 | Status: COMPLETED | OUTPATIENT
Start: 2024-03-13 | End: 2024-03-13

## 2024-03-13 RX ADMIN — Medication 1000 MILLIGRAM(S): at 20:55

## 2024-03-13 RX ADMIN — ENOXAPARIN SODIUM 40 MILLIGRAM(S): 100 INJECTION SUBCUTANEOUS at 05:04

## 2024-03-13 RX ADMIN — PANTOPRAZOLE SODIUM 40 MILLIGRAM(S): 20 TABLET, DELAYED RELEASE ORAL at 05:03

## 2024-03-13 RX ADMIN — Medication 81 MILLIGRAM(S): at 12:34

## 2024-03-13 RX ADMIN — TIOTROPIUM BROMIDE AND OLODATEROL 2 PUFF(S): 3.124; 2.736 SPRAY, METERED RESPIRATORY (INHALATION) at 08:51

## 2024-03-13 RX ADMIN — CLOPIDOGREL BISULFATE 75 MILLIGRAM(S): 75 TABLET, FILM COATED ORAL at 12:34

## 2024-03-13 RX ADMIN — Medication 400 MILLIGRAM(S): at 20:25

## 2024-03-13 RX ADMIN — TIOTROPIUM BROMIDE AND OLODATEROL 2 PUFF(S): 3.124; 2.736 SPRAY, METERED RESPIRATORY (INHALATION) at 20:00

## 2024-03-13 NOTE — OCCUPATIONAL THERAPY INITIAL EVALUATION ADULT - LIVES WITH, PROFILE
son and daughter, in a private house with 2/4 steps to enter with railing and 6 steps inside with railing; pt's daughter is available to assist upon discharge/children

## 2024-03-13 NOTE — CHART NOTE - NSCHARTNOTEFT_GEN_A_CORE
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 Patient was recently discharged from [facility] on 3/5/24 after admission for chief complaint of SOB, chest pain, and confusion; patient had cardiac workup done and basilar tip aneurysm was incidental finding on CT Head, better characterized on follow-up CTA and MRA. Neuro IR evaluated patient during prior admission, with plan for patient to follow-up and see Dr. Oconnell, plan for likely outpatient for diagnostic cerebral angiogram with plan for possible intervention if indicated. Patient did not follow-up outpatient due to insurance issues and now with headache and neck pain, patient came to ED for further evaluation. Patient admitted to Stroke Neurology service for further care. (11 Mar 2024 15:43)    Patient presenting today 3/13/24 for cerebral angiogram for evaluation of 7 mm basilar tip aneurysm with Dr. Oconnell.       Allergies: OHS PT (Unknown)  No Known Allergies    PAST MEDICAL & SURGICAL HISTORY:  HTN (hypertension)  Coronary artery disease involving native coronary artery of native heart, angina presence unspecified  NSTEMI (non-ST elevated myocardial infarction)  S/P CABG x 4  Smoker quit on 7/6 admission  HLD (hyperlipidemia)  History of COPD  S/P CABG x 4    Social History:  Prior smoker, states she started smoking cigarettes ~1 pack per day around age 17 and stopped in July 2018 (~55 pack years)  Denies ETOH use, denies drug use    Patient originally from [place], lives with family here. (11 Mar 2024 15:43)    FAMILY HISTORY:  No pertinent family history in first degree relatives      Current Medications:   albuterol    90 MICROgram(s) HFA Inhaler 2 Puff(s) Inhalation every 6 hours PRN  aspirin  chewable 81 milliGRAM(s) Oral daily  clopidogrel Tablet 75 milliGRAM(s) Oral daily  enoxaparin Injectable 40 milliGRAM(s) SubCutaneous every 24 hours  pantoprazole    Tablet 40 milliGRAM(s) Oral before breakfast  sodium chloride 0.9%. 1000 milliLiter(s) IV Continuous <Continuous>  tiotropium 2.5 MICROgram(s)/olodaterol 2.5 MICROgram(s) (STIOLTO) Inhaler 2 Puff(s) Inhalation two times a day      Physical Exam:  Constitutional: NAD, lying in bed  Neuro  * Mental Status:  GCS 15: Awake, alert, oriented to conversation. No aphasia or difficulty speaking. No dysarthria. Able to name objects and their function.  * Cranial Nerves: Cnii-Cnxii grossly intact. PERRL, EOMI, tongue midline, no gaze deviation  * Motor: RUE 5/5, LUE 5/5, RLE 5/5, LLE 5/5, no drift or dysmetria  * Sensory: Sensation intact to light touch  * Reflexes: not assessed   Cardiovascular: Regular rate and rhythm.  Eyes: See neurologic examination with detailed examination of eyes.  ENT: No JVD, Trachea Midline  Respiratory: non labored breathing   Gastrointestinal: Soft, nontender, nondistended.  Genitourinary: [ ] Garcia, [ x ] No Garcia.   Musculoskeletal: No muscle wasting noted, (See neurologic assessment for full muscle strength assessment)   Skin:  no wounds, no redness, no abrasions noted  Hematologic / Lymph / Immunologic: No bleeding from IV sites or wounds    [facility] SS:  DATE: 3/13/24  TIME:  1A: Level of consciousness (0-3): 0  1B: Questions (0-2): 0    1C: Commands (0-2): 0  2: Gaze (0-2): 0  3: Visual fields (0-3): 0  4: Facial palsy (0-3): 0  MOTOR:  5A: Left arm motor drift (0-4): 0  5B: Right arm motor drift (0-4): 0  6A: Left leg motor drift (0-4): 0  6B: Right leg motor drift (0-4): 0  7: Limb ataxia (0-2): 0  SENSORY:  8: Sensation (0-2): 0  SPEECH:  9: Language (0-3): 0  10: Dysarthria (0-2): 0  EXTINCTION:  11: Extinction/inattention (0-2): 0    TOTAL SCORE:     Labs:                         11.0   7.43  )-----------( 226      ( 13 Mar 2024 05:36 )             33.1       03-12    141  |  97  |  11.5  ----------------------------<  115<H>  3.8   |  28.0  |  0.92    Ca    9.2      12 Mar 2024 05:26  Phos  4.4     03-12  Mg     2.2     03-12    TPro  6.6  /  Alb  3.8  /  TBili  0.5  /  DBili  x   /  AST  14  /  ALT  11  /  AlkPhos  91  03-12    PT/INR - ( 13 Mar 2024 05:36 )   PT: 11.0 sec;   INR: 0.99 ratio    PTT - ( 13 Mar 2024 05:36 )  PTT:35.8 sec      Assessment/Plan:   This is a 78y  year old Female  presents with 7 mm basilar tip aneurysm. Patient presents to neuro-IR for selective cerebral angiography.     Procedure, goals, risks, benefits and alternatives  were discussed with patient and (patient's family).  All questions were answered.  Risks include but are not limited to stroke, vessel injury, hemorrhage, and or groin hematoma.  Patient demonstrates understanding  of all risks involved with this procedure and wishes to continue.   Appropriate  consent was obtained from patient and consent is in the patient's chart.

## 2024-03-13 NOTE — OCCUPATIONAL THERAPY INITIAL EVALUATION ADULT - AMBULATORY DEVICES NEEDED
----- Message from Nancy Khanna MD sent at 8/18/2021  4:39 PM CDT -----  Labs reviewed. See other x-ray report. Chemistry profile. Blood sugar mildly elevated at 105.   (I believe patient was not fasting)Electrolytes, kidney function-normal.Liver funct
----- Message from Noman Gorman MD sent at 8/18/2021  4:37 PM CDT -----  X-ray report reviewed. (Recent office visit for abdominal discomfort. )Bowel gas pattern normal.  No sign of obstruction. Moderate amount of fecal matter.   No noted enlargement o
MyCSilverskyt message sent.
MyChart message reviewed by patient.
no

## 2024-03-13 NOTE — PROGRESS NOTE ADULT - SUBJECTIVE AND OBJECTIVE BOX
Preliminary note, official recommendations pending attending review/signature   Seen and examined by Stroke team attending/team, assessment/ plan as discussed with stroke team attending/team as noted.     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 Patient was recently discharged from [facility] on 3/5/24 after admission for chief complaint of SOB, chest pain, and confusion; patient had cardiac workup done and basilar tip aneurysm was incidental finding on CT Head, better characterized on follow-up CTA and MRA. Neuro IR evaluated patient during prior admission, with plan for patient to follow-up and see Dr. Oconnell, plan for likely outpatient for diagnostic cerebral angiogram with plan for possible intervention if indicated. Patient did not follow-up outpatient due to insurance issues and now with headache and neck pain, patient came to ED for further evaluation. Patient admitted to Stroke Neurology service for further care.    SUBJECTIVE: No events overnight.  No new neurologic complaints.  ROS reported negative unless otherwise noted.    albuterol    90 MICROgram(s) HFA Inhaler 2 Puff(s) Inhalation every 6 hours PRN  aspirin  chewable 81 milliGRAM(s) Oral daily  clopidogrel Tablet 75 milliGRAM(s) Oral daily  enoxaparin Injectable 40 milliGRAM(s) SubCutaneous every 24 hours  pantoprazole    Tablet 40 milliGRAM(s) Oral before breakfast  sodium chloride 0.9%. 1000 milliLiter(s) IV Continuous <Continuous>  tiotropium 2.5 MICROgram(s)/olodaterol 2.5 MICROgram(s) (STIOLTO) Inhaler 2 Puff(s) Inhalation two times a day      PHYSICAL EXAM:   Vital Signs Last 24 Hrs  T(C): 36.4 (13 Mar 2024 08:05), Max: 37.1 (12 Mar 2024 12:22)  T(F): 97.6 (13 Mar 2024 08:05), Max: 98.7 (12 Mar 2024 12:22)  HR: 63 (13 Mar 2024 08:05) (63 - 76)  BP: 148/76 (13 Mar 2024 08:05) (125/70 - 148/76)  BP(mean): --  RR: 18 (13 Mar 2024 08:05) (17 - 18)  SpO2: 96% (13 Mar 2024 08:05) (96% - 98%)    Parameters below as of 13 Mar 2024 08:05  Patient On (Oxygen Delivery Method): room air    EXAM PENDING     General: No acute distress.   HEENT: Head normocephalic, atraumatic. Conjunctivae clear w/o exudates or hemorrhage. Sclera non-icteric. Nares are patent bilaterally. Mucous membranes pink and moist.  No tonsillar swelling or exudates.    Neck: Supple, no adenopathy. Trachea midline. No JVD.  Cardiac: Normal rate and rhythm. S1, S2 auscultated. No murmurs, gallops, or rubs.     Respiratory: Lung sounds clear in all fields. Chest wall symmetric, nontender.   Abdominal: Soft, nondistended, nontender. Bowel sounds normoactive x 4 quadrants. No masses, hepatomegaly, or splenomegaly.   Skin: Skin is warm, dry and intact without rashes or lesions. Appropriate color for ethnicity. Nailbeds pink with no cyanosis or clubbing.   Extremities: No edema, 2+ peripheral pulses in b/l upper and b/l lower extremities. Full range of motion in all joints.     NEUROLOGICAL EXAM:  Mental status: Awake, alert, oriented x person, place, speech fluent, follows commands, no neglect, normal memory   Cranial Nerves: No facial asymmetry, no nystagmus, no dysarthria,  tongue midline  Motor exam: Normal tone, no drift, 5/5 RUE, 5/5 RLE, 5/5 LUE, 5/5 LLE, normal fine finger movements.  Sensation: Intact to light touch   Coordination/ Gait: No dysmetria, gait not tested    LABS:                        11.0   7.43  )-----------( 226      ( 13 Mar 2024 05:36 )             33.1    03-13    141  |  99  |  12.5  ----------------------------<  116<H>  4.0   |  29.0  |  0.76    Ca    9.0      13 Mar 2024 05:36  Phos  4.4     03-12  Mg     2.2     03-12    TPro  6.6  /  Alb  3.9  /  TBili  0.5  /  DBili  x   /  AST  14  /  ALT  10  /  AlkPhos  92  03-13  PT/INR - ( 13 Mar 2024 05:36 )   PT: 11.0 sec;   INR: 0.99 ratio         PTT - ( 13 Mar 2024 05:36 )  PTT:35.8 sec          03-05 Chol 119 LDL -37 - HDL 47<L> Trig 176<H>    A1C: 5.2    IMAGING: Reviewed by me.     CT Head No Cont (03.11.24 @ 09:41)   IMPRESSION:  Limited exam due to motion  No acute intracranial hemorrhage or acute territorial infarct.  If   symptoms persist, follow-up MRI exam recommended.       (03.11.24 @ 14:17)  IMPRESSION:  MR Head No Cont (03.11.24 @ 19:22)   Chronic left external capsule hemosiderin. Chronic bilateral   microhemorrhages.    Basilar tip aneurysm is better demonstrated on prior MRA brain.     MR Angio Head. Neck w/ IV Cont (03.04.24 @ 12:19)  1.  RIGHT CAROTID NECK CIRCULATION:   Intact.  2.  LEFT CAROTID NECK CIRCULATION:    Intact.  3.  VERTEBRAL NECK CIRCULATION:   Intact  4.  ANTERIOR INTRACRANIAL CIRCULATION:     Intact.  5.  POSTERIOR INTRACRANIAL CIRCULATION:   Complex appearance of the   basilar tip suggesting centrally thrombosed thrombosed and peripherally   patent saccular aneurysm. Note that vascular developmental duplication   and fenestration could have a similar appearance. Consider alternate   imaging modality gadolinium enhanced MR angiography or catheter   angiography    CT HEAD:  1.  No evidence of acute intracranial hemorrhage or midline shift.  2.  Chronic ischemic changes as discussed above.    CTA BRAIN:  1.  Redemonstrated aneurysmal change of the basilar tip, perhaps   minimally increased in size in the AP dimension when compared to prior   imaging.  2.  Mild blistering/fusiform aneurysmal change in the cavernous segment   of the left ICA.  3.Questionable small aneurysmal change at a left M2 bifurcation.    CTA NECK:  1.  Atherosclerotic calcification in the bilateral carotid bulbs,   resulting in severe narrowing of the proximal left ICA. Consider Doppler   ultrasound for further characterization of flow dynamics.  2.  Subcentimeter nodule in the left lung apex. Recommend follow-up   imaging as per Fleischner criteria.       Preliminary note, official recommendations pending attending review/signature   Seen and examined by Stroke team attending/team, assessment/ plan as discussed with stroke team attending/team as noted.   Translation per son at bedside as preferred.   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 Patient was recently discharged from [facility] on 3/5/24 after admission for chief complaint of SOB, chest pain, and confusion; patient had cardiac workup done and basilar tip aneurysm was incidental finding on CT Head, better characterized on follow-up CTA and MRA. Neuro IR evaluated patient during prior admission, with plan for patient to follow-up and see Dr. Oconnell, plan for likely outpatient for diagnostic cerebral angiogram with plan for possible intervention if indicated. Patient did not follow-up outpatient due to insurance issues and now with headache and neck pain, patient came to ED for further evaluation. Patient admitted to Stroke Neurology service for further care.    SUBJECTIVE: No events overnight.  No new neurologic complaints.  ROS reported negative unless otherwise noted.    albuterol    90 MICROgram(s) HFA Inhaler 2 Puff(s) Inhalation every 6 hours PRN  aspirin  chewable 81 milliGRAM(s) Oral daily  clopidogrel Tablet 75 milliGRAM(s) Oral daily  enoxaparin Injectable 40 milliGRAM(s) SubCutaneous every 24 hours  pantoprazole    Tablet 40 milliGRAM(s) Oral before breakfast  sodium chloride 0.9%. 1000 milliLiter(s) IV Continuous <Continuous>  tiotropium 2.5 MICROgram(s)/olodaterol 2.5 MICROgram(s) (STIOLTO) Inhaler 2 Puff(s) Inhalation two times a day      PHYSICAL EXAM:   Vital Signs Last 24 Hrs  T(C): 36.4 (13 Mar 2024 08:05), Max: 37.1 (12 Mar 2024 12:22)  T(F): 97.6 (13 Mar 2024 08:05), Max: 98.7 (12 Mar 2024 12:22)  HR: 63 (13 Mar 2024 08:05) (63 - 76)  BP: 148/76 (13 Mar 2024 08:05) (125/70 - 148/76)  BP(mean): --  RR: 18 (13 Mar 2024 08:05) (17 - 18)  SpO2: 96% (13 Mar 2024 08:05) (96% - 98%)    Parameters below as of 13 Mar 2024 08:05  Patient On (Oxygen Delivery Method): room air         General: No acute distress.   HEENT: Head normocephalic, atraumatic. Conjunctivae clear w/o exudates or drainage   Cardiac: Normal rate and rhythm. S1, S2 auscultated. No murmurs, gallops, or rubs.     Respiratory: Lung sounds clear in all fields. Chest wall symmetric, nontender.   Abdominal: Soft, nondistended, nontender. Bowel sounds normoactive x 4 quadrants.    Skin: Skin is warm   Extremities: No edema     NEUROLOGICAL EXAM:  Mental status: Awake, alert, oriented x person, place, speech fluent, follows commands, no neglect, normal memory   Cranial Nerves: No facial asymmetry, no nystagmus, no dysarthria,  tongue midline  Motor exam: Normal tone, no drift, 5/5 RUE, 5/5 RLE, 5/5 LUE, 5/5 LLE, normal fine finger movements.  Sensation: Intact to light touch   Coordination/ Gait: No dysmetria, gait not tested    LABS:                        11.0   7.43  )-----------( 226      ( 13 Mar 2024 05:36 )             33.1    03-13    141  |  99  |  12.5  ----------------------------<  116<H>  4.0   |  29.0  |  0.76    Ca    9.0      13 Mar 2024 05:36  Phos  4.4     03-12  Mg     2.2     03-12    TPro  6.6  /  Alb  3.9  /  TBili  0.5  /  DBili  x   /  AST  14  /  ALT  10  /  AlkPhos  92  03-13  PT/INR - ( 13 Mar 2024 05:36 )   PT: 11.0 sec;   INR: 0.99 ratio         PTT - ( 13 Mar 2024 05:36 )  PTT:35.8 sec          03-05 Chol 119 LDL -37 - HDL 47<L> Trig 176<H>    A1C: 5.2    IMAGING: Reviewed by me.     CT Head No Cont (03.11.24 @ 09:41)   IMPRESSION:  Limited exam due to motion  No acute intracranial hemorrhage or acute territorial infarct.  If   symptoms persist, follow-up MRI exam recommended.       (03.11.24 @ 14:17)  IMPRESSION:  MR Head No Cont (03.11.24 @ 19:22)   Chronic left external capsule hemosiderin. Chronic bilateral   microhemorrhages.    Basilar tip aneurysm is better demonstrated on prior MRA brain.     MR Angio Head. Neck w/ IV Cont (03.04.24 @ 12:19)  1.  RIGHT CAROTID NECK CIRCULATION:   Intact.  2.  LEFT CAROTID NECK CIRCULATION:    Intact.  3.  VERTEBRAL NECK CIRCULATION:   Intact  4.  ANTERIOR INTRACRANIAL CIRCULATION:     Intact.  5.  POSTERIOR INTRACRANIAL CIRCULATION:   Complex appearance of the   basilar tip suggesting centrally thrombosed thrombosed and peripherally   patent saccular aneurysm. Note that vascular developmental duplication   and fenestration could have a similar appearance. Consider alternate   imaging modality gadolinium enhanced MR angiography or catheter   angiography    CT HEAD:  1.  No evidence of acute intracranial hemorrhage or midline shift.  2.  Chronic ischemic changes as discussed above.    CTA BRAIN:  1.  Redemonstrated aneurysmal change of the basilar tip, perhaps   minimally increased in size in the AP dimension when compared to prior   imaging.  2.  Mild blistering/fusiform aneurysmal change in the cavernous segment   of the left ICA.  3.Questionable small aneurysmal change at a left M2 bifurcation.    CTA NECK:  1.  Atherosclerotic calcification in the bilateral carotid bulbs,   resulting in severe narrowing of the proximal left ICA. Consider Doppler   ultrasound for further characterization of flow dynamics.  2.  Subcentimeter nodule in the left lung apex. Recommend follow-up   imaging as per Fleischner criteria.       Translation per son at bedside as preferred.   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 Patient was recently discharged from [facility] on 3/5/24 after admission for chief complaint of SOB, chest pain, and confusion; patient had cardiac workup done and basilar tip aneurysm was incidental finding on CT Head, better characterized on follow-up CTA and MRA. Neuro IR evaluated patient during prior admission, with plan for patient to follow-up and see Dr. Oconnell, plan for likely outpatient for diagnostic cerebral angiogram with plan for possible intervention if indicated. Patient did not follow-up outpatient due to insurance issues and now with headache and neck pain, patient came to ED for further evaluation. Patient admitted to Stroke Neurology service for further care.    SUBJECTIVE: No events overnight.  No new neurologic complaints.  ROS reported negative unless otherwise noted.    albuterol    90 MICROgram(s) HFA Inhaler 2 Puff(s) Inhalation every 6 hours PRN  aspirin  chewable 81 milliGRAM(s) Oral daily  clopidogrel Tablet 75 milliGRAM(s) Oral daily  enoxaparin Injectable 40 milliGRAM(s) SubCutaneous every 24 hours  pantoprazole    Tablet 40 milliGRAM(s) Oral before breakfast  sodium chloride 0.9%. 1000 milliLiter(s) IV Continuous <Continuous>  tiotropium 2.5 MICROgram(s)/olodaterol 2.5 MICROgram(s) (STIOLTO) Inhaler 2 Puff(s) Inhalation two times a day      PHYSICAL EXAM:   Vital Signs Last 24 Hrs  T(C): 36.4 (13 Mar 2024 08:05), Max: 37.1 (12 Mar 2024 12:22)  T(F): 97.6 (13 Mar 2024 08:05), Max: 98.7 (12 Mar 2024 12:22)  HR: 63 (13 Mar 2024 08:05) (63 - 76)  BP: 148/76 (13 Mar 2024 08:05) (125/70 - 148/76)  BP(mean): --  RR: 18 (13 Mar 2024 08:05) (17 - 18)  SpO2: 96% (13 Mar 2024 08:05) (96% - 98%)    Parameters below as of 13 Mar 2024 08:05  Patient On (Oxygen Delivery Method): room air         General: No acute distress.   HEENT: Head normocephalic, atraumatic. Conjunctivae clear w/o exudates or drainage   Cardiac: Normal rate and rhythm. S1, S2 auscultated. No murmurs, gallops, or rubs.     Respiratory: Lung sounds clear in all fields. Chest wall symmetric, nontender.   Abdominal: Soft, nondistended, nontender. Bowel sounds normoactive x 4 quadrants.    Skin: Skin is warm   Extremities: No edema     NEUROLOGICAL EXAM:  Mental status: Awake, alert, oriented x person, place, speech fluent, follows commands, no neglect, normal memory   Cranial Nerves: No facial asymmetry, no nystagmus, no dysarthria,  tongue midline  Motor exam: Normal tone, no drift, 5/5 RUE, 5/5 RLE, 5/5 LUE, 5/5 LLE, normal fine finger movements.  Sensation: Intact to light touch   Coordination/ Gait: No dysmetria, gait not tested    LABS:                        11.0   7.43  )-----------( 226      ( 13 Mar 2024 05:36 )             33.1    03-13    141  |  99  |  12.5  ----------------------------<  116<H>  4.0   |  29.0  |  0.76    Ca    9.0      13 Mar 2024 05:36  Phos  4.4     03-12  Mg     2.2     03-12    TPro  6.6  /  Alb  3.9  /  TBili  0.5  /  DBili  x   /  AST  14  /  ALT  10  /  AlkPhos  92  03-13  PT/INR - ( 13 Mar 2024 05:36 )   PT: 11.0 sec;   INR: 0.99 ratio         PTT - ( 13 Mar 2024 05:36 )  PTT:35.8 sec          03-05 Chol 119 LDL -37 - HDL 47<L> Trig 176<H>    A1C: 5.2    IMAGING: Reviewed by me.     CT Head No Cont (03.11.24 @ 09:41)   IMPRESSION:  Limited exam due to motion  No acute intracranial hemorrhage or acute territorial infarct.  If   symptoms persist, follow-up MRI exam recommended.       (03.11.24 @ 14:17)  IMPRESSION:  MR Head No Cont (03.11.24 @ 19:22)   Chronic left external capsule hemosiderin. Chronic bilateral   microhemorrhages.    Basilar tip aneurysm is better demonstrated on prior MRA brain.     MR Angio Head. Neck w/ IV Cont (03.04.24 @ 12:19)  1.  RIGHT CAROTID NECK CIRCULATION:   Intact.  2.  LEFT CAROTID NECK CIRCULATION:    Intact.  3.  VERTEBRAL NECK CIRCULATION:   Intact  4.  ANTERIOR INTRACRANIAL CIRCULATION:     Intact.  5.  POSTERIOR INTRACRANIAL CIRCULATION:   Complex appearance of the   basilar tip suggesting centrally thrombosed thrombosed and peripherally   patent saccular aneurysm. Note that vascular developmental duplication   and fenestration could have a similar appearance. Consider alternate   imaging modality gadolinium enhanced MR angiography or catheter   angiography    CT HEAD:  1.  No evidence of acute intracranial hemorrhage or midline shift.  2.  Chronic ischemic changes as discussed above.    CTA BRAIN:  1.  Redemonstrated aneurysmal change of the basilar tip, perhaps   minimally increased in size in the AP dimension when compared to prior   imaging.  2.  Mild blistering/fusiform aneurysmal change in the cavernous segment   of the left ICA.  3.Questionable small aneurysmal change at a left M2 bifurcation.    CTA NECK:  1.  Atherosclerotic calcification in the bilateral carotid bulbs,   resulting in severe narrowing of the proximal left ICA. Consider Doppler   ultrasound for further characterization of flow dynamics.  2.  Subcentimeter nodule in the left lung apex. Recommend follow-up   imaging as per Fleischner criteria.

## 2024-03-13 NOTE — PROGRESS NOTE ADULT - ASSESSMENT
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 CT head without acute infarction or hemorrhage, CT angiogram brain re- demonstrated basilar tip aneurysm which was noted as slightly increased vs. prior exam, in addition there is noted left ICA aneurysmal dilation and a questionable left MCA M2 bifurcation aneurysmal change.  CT angiogram neck showed severe bilateral atherosclerotic disease; severe in left.     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     NEURO:   -Neurologically without acute change   -Continue close monitoring for neurologic deterioration    - Stroke neuro checks q 4 hours    - SBP goal 110-150mmHg avoid rapid fluctuations and hypotension; appears to be tolerating neurologically.    -ANTITHROMBOTIC THERAPY: on home DAPT regimen; [id number]/ Clopidogrel 75mg  once daily. P2y12.   -titrate statin to LDL goal less than 70   - carotid duplex  -cerebral angiogram 3/14/24  -Dysphagia screen, if passed, advance as tolerated per protocol , NPO after midnight 3/13-14  -Physical therapy/OT/Speech eval/treatment.     -CARDIOVASCULAR: cardiac monitoring w/ telemetry for now, further evaluation pending findings of noted workup   , continue home cardiac regimen, close outpatient cardiology follow up                          -HEMATOLOGY: H/H with anemia, no acute change, Platelets 226, patient should have all age and risk appropriate malignancy screenings with PCP or sooner if clinically suspected      DVT ppx: Heparin s.c [] LMWH [x]     PULMONARY: CXR possible right lower lobe opacity, follow up cxr pa/lat pending,  protecting airway, saturating well , outpatient follow up for incidentally noted lung nodules , resume home COPD regimen     RENAL: BUN/Cr within range, monitor urine output, maintain adequate hydration       Na Goal:  135-145    ID: afebrile, no leukocytosis, monitor for si/sx of infection     OTHER:  condition and plan of care d/w patient and family, questions and concerns addressed.     DISPOSITION: home w. recommendations per pt/ot.       CORE MEASURES:        Admission NIHSS: 1     Tenecteplase : [] YES [x] NO      LDL/HDL/A1C: 37 /5.2     Depression Screen- if depression hx and/or present      Statin Therapy: as noted      Dysphagia Screen: [x] PASS [] FAIL     Smoking [] YES [x] NO      Afib [] YES [x] NO     Stroke Education [] YES [] NO pending     Obtain screening lower extremity venous ultrasound in patients who meet 1 or more of the following criteria as patient is high risk for DVT/PE on admission:   [] History of DVT/PE  []Hypercoagulable states (Factor V Leiden, Cancer, OCP, etc. 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 CT head without acute infarction or hemorrhage, CT angiogram brain re- demonstrated basilar tip aneurysm which was noted as slightly increased vs. prior exam, in addition there is noted left ICA aneurysmal dilation and a questionable left MCA M2 bifurcation aneurysmal change.  CT angiogram neck showed severe bilateral atherosclerotic disease; severe in left.     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     NEURO:   -Neurologically without acute change   -Continue close monitoring for neurologic deterioration    - Stroke neuro checks q 4 hours    - SBP goal 110-150mmHg avoid rapid fluctuations and hypotension; appears to be tolerating neurologically.    -ANTITHROMBOTIC THERAPY: on home DAPT regimen; [id number]/ Clopidogrel 75mg  once daily. P2y12.   -titrate statin to LDL goal less than 70   - carotid duplex  -cerebral angiogram 3/14/24  -Dysphagia screen, if passed, advance as tolerated per protocol , NPO after midnight 3/13-14  -Physical therapy/OT/Speech eval/treatment.     -CARDIOVASCULAR: cardiac monitoring w/ telemetry for now, further evaluation pending findings of noted workup   , continue home cardiac regimen, close outpatient cardiology follow up                          -HEMATOLOGY: H/H with anemia, no acute change, Platelets 226, patient should have all age and risk appropriate malignancy screenings with PCP or sooner if clinically suspected      DVT ppx: Heparin s.c [] LMWH [x]     PULMONARY: CXR possible right lower lobe opacity, follow up cxr pa/lat pending,  protecting airway, saturating well , outpatient follow up for incidentally noted lung nodules , resume home COPD regimen     RENAL: BUN/Cr within range, monitor urine output, maintain adequate hydration       Na Goal:  135-145    ID: afebrile, no leukocytosis, monitor for si/sx of infection     OTHER:  condition and plan of care d/w patient and family, questions and concerns addressed.     DISPOSITION: home w. recommendations per pt/ot.       CORE MEASURES:        Admission NIHSS: 1     Tenecteplase : [] YES [x] NO      LDL/HDL/A1C: 37 /5.2     Depression Screen- if depression hx and/or present      Statin Therapy: as noted      Dysphagia Screen: [x] PASS [] FAIL     Smoking [] YES [x] NO      Afib [] YES [x] NO     Stroke Education [] YES [] NO pending     Obtain screening lower extremity venous ultrasound in patients who meet 1 or more of the following criteria as patient is high risk for DVT/PE on admission:   [] History of DVT/PE  []Hypercoagulable states (Factor V Leiden, Cancer, OCP, etc. )  []Prolonged immobility (hemiplegia/hemiparesis/post operative or any other extended immobilization)  [] Transferred from outside facility (Rehab or Long term care)  [] Age </= to 50

## 2024-03-14 ENCOUNTER — APPOINTMENT (OUTPATIENT)
Dept: NEUROLOGY | Facility: HOSPITAL | Age: 79
End: 2024-03-14

## 2024-03-14 ENCOUNTER — TRANSCRIPTION ENCOUNTER (OUTPATIENT)
Age: 79
End: 2024-03-14

## 2024-03-14 VITALS
RESPIRATION RATE: 18 BRPM | DIASTOLIC BLOOD PRESSURE: 76 MMHG | TEMPERATURE: 98 F | OXYGEN SATURATION: 95 % | HEART RATE: 74 BPM | SYSTOLIC BLOOD PRESSURE: 131 MMHG

## 2024-03-14 LAB
ALBUMIN SERPL ELPH-MCNC: 3.7 G/DL — SIGNIFICANT CHANGE UP (ref 3.3–5.2)
ALP SERPL-CCNC: 88 U/L — SIGNIFICANT CHANGE UP (ref 40–120)
ALT FLD-CCNC: 10 U/L — SIGNIFICANT CHANGE UP
ANION GAP SERPL CALC-SCNC: 13 MMOL/L — SIGNIFICANT CHANGE UP (ref 5–17)
APTT BLD: 33 SEC — SIGNIFICANT CHANGE UP (ref 24.5–35.6)
AST SERPL-CCNC: 13 U/L — SIGNIFICANT CHANGE UP
BILIRUB SERPL-MCNC: 0.5 MG/DL — SIGNIFICANT CHANGE UP (ref 0.4–2)
BLD GP AB SCN SERPL QL: SIGNIFICANT CHANGE UP
BUN SERPL-MCNC: 12 MG/DL — SIGNIFICANT CHANGE UP (ref 8–20)
CALCIUM SERPL-MCNC: 8.6 MG/DL — SIGNIFICANT CHANGE UP (ref 8.4–10.5)
CHLORIDE SERPL-SCNC: 98 MMOL/L — SIGNIFICANT CHANGE UP (ref 96–108)
CO2 SERPL-SCNC: 25 MMOL/L — SIGNIFICANT CHANGE UP (ref 22–29)
CREAT SERPL-MCNC: 0.85 MG/DL — SIGNIFICANT CHANGE UP (ref 0.5–1.3)
EGFR: 71 ML/MIN/1.73M2 — SIGNIFICANT CHANGE UP
GLUCOSE SERPL-MCNC: 130 MG/DL — HIGH (ref 70–99)
HCT VFR BLD CALC: 30.4 % — LOW (ref 34.5–45)
HGB BLD-MCNC: 10.2 G/DL — LOW (ref 11.5–15.5)
INR BLD: 0.99 RATIO — SIGNIFICANT CHANGE UP (ref 0.85–1.18)
MAGNESIUM SERPL-MCNC: 2.1 MG/DL — SIGNIFICANT CHANGE UP (ref 1.6–2.6)
MCHC RBC-ENTMCNC: 31.8 PG — SIGNIFICANT CHANGE UP (ref 27–34)
MCHC RBC-ENTMCNC: 33.6 GM/DL — SIGNIFICANT CHANGE UP (ref 32–36)
MCV RBC AUTO: 94.7 FL — SIGNIFICANT CHANGE UP (ref 80–100)
PA ADP PRP-ACNC: 229 PRU — SIGNIFICANT CHANGE UP (ref 180–376)
PHOSPHATE SERPL-MCNC: 3.6 MG/DL — SIGNIFICANT CHANGE UP (ref 2.4–4.7)
PLATELET # BLD AUTO: 213 K/UL — SIGNIFICANT CHANGE UP (ref 150–400)
POTASSIUM SERPL-MCNC: 3.6 MMOL/L — SIGNIFICANT CHANGE UP (ref 3.5–5.3)
POTASSIUM SERPL-SCNC: 3.6 MMOL/L — SIGNIFICANT CHANGE UP (ref 3.5–5.3)
PROT SERPL-MCNC: 6.3 G/DL — LOW (ref 6.6–8.7)
PROTHROM AB SERPL-ACNC: 11 SEC — SIGNIFICANT CHANGE UP (ref 9.5–13)
RBC # BLD: 3.21 M/UL — LOW (ref 3.8–5.2)
RBC # FLD: 11.9 % — SIGNIFICANT CHANGE UP (ref 10.3–14.5)
SODIUM SERPL-SCNC: 136 MMOL/L — SIGNIFICANT CHANGE UP (ref 135–145)
WBC # BLD: 6.24 K/UL — SIGNIFICANT CHANGE UP (ref 3.8–10.5)
WBC # FLD AUTO: 6.24 K/UL — SIGNIFICANT CHANGE UP (ref 3.8–10.5)

## 2024-03-14 PROCEDURE — 99233 SBSQ HOSP IP/OBS HIGH 50: CPT

## 2024-03-14 RX ORDER — ALBUTEROL 90 UG/1
2 AEROSOL, METERED ORAL
Refills: 0 | DISCHARGE

## 2024-03-14 RX ORDER — ALBUTEROL 90 UG/1
2 AEROSOL, METERED ORAL
Qty: 0 | Refills: 0 | DISCHARGE
Start: 2024-03-14

## 2024-03-14 RX ORDER — TIOTROPIUM BROMIDE AND OLODATEROL 3.124; 2.736 UG/1; UG/1
2 SPRAY, METERED RESPIRATORY (INHALATION)
Refills: 0 | DISCHARGE

## 2024-03-14 RX ORDER — TIOTROPIUM BROMIDE AND OLODATEROL 3.124; 2.736 UG/1; UG/1
2.5 SPRAY, METERED RESPIRATORY (INHALATION)
Qty: 0 | Refills: 0 | DISCHARGE
Start: 2024-03-14

## 2024-03-14 RX ADMIN — Medication 81 MILLIGRAM(S): at 18:07

## 2024-03-14 RX ADMIN — PANTOPRAZOLE SODIUM 40 MILLIGRAM(S): 20 TABLET, DELAYED RELEASE ORAL at 06:40

## 2024-03-14 RX ADMIN — CLOPIDOGREL BISULFATE 75 MILLIGRAM(S): 75 TABLET, FILM COATED ORAL at 18:08

## 2024-03-14 NOTE — PROGRESS NOTE ADULT - SUBJECTIVE AND OBJECTIVE BOX
Preliminary note, official recommendations pending attending review/signature   Seen and examined by Stroke team attending/team, assessment/ plan as discussed with stroke team attending/team as noted.     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 Patient was recently discharged from [facility] on 3/5/24 after admission for chief complaint of SOB, chest pain, and confusion; patient had cardiac workup done and basilar tip aneurysm was incidental finding on CT Head, better characterized on follow-up CTA and MRA. Neuro IR evaluated patient during prior admission, with plan for patient to follow-up and see Dr. Oconnell, plan for likely outpatient for diagnostic cerebral angiogram with plan for possible intervention if indicated. Patient did not follow-up outpatient due to insurance issues and now with headache and neck pain, patient came to ED for further evaluation. Patient admitted to Stroke Neurology service for further care.    SUBJECTIVE: No events overnight.  No new neurologic complaints.  ROS reported negative unless otherwise noted.    albuterol    90 MICROgram(s) HFA Inhaler 2 Puff(s) Inhalation every 6 hours PRN  aspirin  chewable 81 milliGRAM(s) Oral daily  clopidogrel Tablet 75 milliGRAM(s) Oral daily  enoxaparin Injectable 40 milliGRAM(s) SubCutaneous every 24 hours  pantoprazole    Tablet 40 milliGRAM(s) Oral before breakfast  sodium chloride 0.9%. 1000 milliLiter(s) IV Continuous <Continuous>  tiotropium 2.5 MICROgram(s)/olodaterol 2.5 MICROgram(s) (STIOLTO) Inhaler 2 Puff(s) Inhalation two times a day      PHYSICAL EXAM:   Vital Signs Last 24 Hrs  T(C): 36.9 (14 Mar 2024 04:41), Max: 37.1 (13 Mar 2024 15:21)  T(F): 98.4 (14 Mar 2024 04:41), Max: 98.8 (13 Mar 2024 15:21)  HR: 66 (14 Mar 2024 04:41) (59 - 74)  BP: 100/57 (14 Mar 2024 04:41) (100/57 - 149/68)  BP(mean): --  RR: 18 (14 Mar 2024 04:41) (18 - 18)  SpO2: 97% (14 Mar 2024 04:41) (96% - 97%)    Parameters below as of 13 Mar 2024 19:24  Patient On (Oxygen Delivery Method): room air    EXAM PENDING     General: No acute distress.   HEENT: Head normocephalic, atraumatic. Conjunctivae clear w/o exudates or drainage   Cardiac: Normal rate and rhythm. S1, S2 auscultated. No murmurs, gallops, or rubs.     Respiratory: Lung sounds clear in all fields. Chest wall symmetric, nontender.   Abdominal: Soft, nondistended, nontender. Bowel sounds normoactive x 4 quadrants.    Skin: Skin is warm   Extremities: No edema     NEUROLOGICAL EXAM:  Mental status: Awake, alert, oriented x person, place, speech fluent, follows commands, no neglect, normal memory   Cranial Nerves: No facial asymmetry, no nystagmus, no dysarthria,  tongue midline  Motor exam: Normal tone, no drift, 5/5 RUE, 5/5 RLE, 5/5 LUE, 5/5 LLE, normal fine finger movements.  Sensation: Intact to light touch   Coordination/ Gait: No dysmetria, gait not tested    LABS:                        10.2   6.24  )-----------( 213      ( 14 Mar 2024 06:39 )             30.4    03-14    136  |  98  |  12.0  ----------------------------<  130<H>  3.6   |  25.0  |  0.85    Ca    8.6      14 Mar 2024 06:39  Phos  3.6     03-14  Mg     2.1     03-14    TPro  6.3<L>  /  Alb  3.7  /  TBili  0.5  /  DBili  x   /  AST  13  /  ALT  10  /  AlkPhos  88  03-14  PT/INR - ( 14 Mar 2024 06:39 )   PT: 11.0 sec;   INR: 0.99 ratio         PTT - ( 14 Mar 2024 06:39 )  PTT:33.0 sec        03-05 Chol 119 LDL -37 - HDL 47<L> Trig 176<H>    A1C: 5.2    IMAGING: Reviewed by me.     US Duplex Carotid Arteries Complete, Bilateral (03.12.24 @ 11:49)   IMPRESSION:  Moderate calcified atheromatous plaque is present most prominently in the   regions of the bifurcations.   The luminal narrowing in the left internal carotid artery is approaching   a 50% stenosis.    CT Head No Cont (03.11.24 @ 09:41)   IMPRESSION:  Limited exam due to motion  No acute intracranial hemorrhage or acute territorial infarct.  If   symptoms persist, follow-up MRI exam recommended.     (03.11.24 @ 14:17)  IMPRESSION:  MR Head No Cont (03.11.24 @ 19:22)   Chronic left external capsule hemosiderin. Chronic bilateral   microhemorrhages.    Basilar tip aneurysm is better demonstrated on prior MRA brain.     MR Angio Head. Neck w/ IV Cont (03.04.24 @ 12:19)  1.  RIGHT CAROTID NECK CIRCULATION:   Intact.  2.  LEFT CAROTID NECK CIRCULATION:    Intact.  3.  VERTEBRAL NECK CIRCULATION:   Intact  4.  ANTERIOR INTRACRANIAL CIRCULATION:     Intact.  5.  POSTERIOR INTRACRANIAL CIRCULATION:   Complex appearance of the   basilar tip suggesting centrally thrombosed thrombosed and peripherally   patent saccular aneurysm. Note that vascular developmental duplication   and fenestration could have a similar appearance. Consider alternate   imaging modality gadolinium enhanced MR angiography or catheter   angiography    CT HEAD:  1.  No evidence of acute intracranial hemorrhage or midline shift.  2.  Chronic ischemic changes as discussed above.    CTA BRAIN:  1.  Redemonstrated aneurysmal change of the basilar tip, perhaps   minimally increased in size in the AP dimension when compared to prior   imaging.  2.  Mild blistering/fusiform aneurysmal change in the cavernous segment   of the left ICA.  3.Questionable small aneurysmal change at a left M2 bifurcation.    CTA NECK:  1.  Atherosclerotic calcification in the bilateral carotid bulbs,   resulting in severe narrowing of the proximal left ICA. Consider Doppler   ultrasound for further characterization of flow dynamics.  2.  Subcentimeter nodule in the left lung apex. Recommend follow-up   imaging as per Fleischner criteria.           Preliminary note, official recommendations pending attending review/signature   Seen and examined by Stroke team attending assessment/ plan as discussed with stroke team attending/team as noted.     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 Patient was recently discharged from [facility] on 3/5/24 after admission for chief complaint of SOB, chest pain, and confusion; patient had cardiac workup done and basilar tip aneurysm was incidental finding on CT Head, better characterized on follow-up CTA and MRA. Neuro IR evaluated patient during prior admission, with plan for patient to follow-up and see Dr. Oconnell, plan for likely outpatient for diagnostic cerebral angiogram with plan for possible intervention if indicated. Patient did not follow-up outpatient due to insurance issues and now with headache and neck pain, patient came to ED for further evaluation. Patient admitted to Stroke Neurology service for further care.    SUBJECTIVE: No events overnight.  No new neurologic complaints.  ROS reported negative unless otherwise noted.    albuterol    90 MICROgram(s) HFA Inhaler 2 Puff(s) Inhalation every 6 hours PRN  aspirin  chewable 81 milliGRAM(s) Oral daily  clopidogrel Tablet 75 milliGRAM(s) Oral daily  enoxaparin Injectable 40 milliGRAM(s) SubCutaneous every 24 hours  pantoprazole    Tablet 40 milliGRAM(s) Oral before breakfast  sodium chloride 0.9%. 1000 milliLiter(s) IV Continuous <Continuous>  tiotropium 2.5 MICROgram(s)/olodaterol 2.5 MICROgram(s) (STIOLTO) Inhaler 2 Puff(s) Inhalation two times a day      PHYSICAL EXAM:   Vital Signs Last 24 Hrs  T(C): 36.9 (14 Mar 2024 04:41), Max: 37.1 (13 Mar 2024 15:21)  T(F): 98.4 (14 Mar 2024 04:41), Max: 98.8 (13 Mar 2024 15:21)  HR: 66 (14 Mar 2024 04:41) (59 - 74)  BP: 100/57 (14 Mar 2024 04:41) (100/57 - 149/68)  BP(mean): --  RR: 18 (14 Mar 2024 04:41) (18 - 18)  SpO2: 97% (14 Mar 2024 04:41) (96% - 97%)    Parameters below as of 13 Mar 2024 19:24  Patient On (Oxygen Delivery Method): room air     General: No acute distress.   HEENT: Head normocephalic, atraumatic. Conjunctivae clear w/o exudates or drainage   Cardiac: Normal rate and rhythm. S1, S2 auscultated. No murmurs, gallops, or rubs.     Respiratory: Lung sounds clear in all fields. Chest wall symmetric, nontender.   Abdominal: Soft, nondistended, nontender. Bowel sounds normoactive x 4 quadrants.    Skin: Skin is warm   Extremities: No edema     NEUROLOGICAL EXAM:  Mental status: Awake, alert, oriented x person, place, speech fluent, follows commands, no neglect, normal memory   Cranial Nerves: No facial asymmetry, no nystagmus, no dysarthria,  tongue midline  Motor exam: Normal tone, no drift, 5/5 RUE, 5/5 RLE, 5/5 LUE, 5/5 LLE, normal fine finger movements.  Sensation: Intact to light touch   Coordination/ Gait: No dysmetria, gait not tested    LABS:                        10.2   6.24  )-----------( 213      ( 14 Mar 2024 06:39 )             30.4    03-14    136  |  98  |  12.0  ----------------------------<  130<H>  3.6   |  25.0  |  0.85    Ca    8.6      14 Mar 2024 06:39  Phos  3.6     03-14  Mg     2.1     03-14    TPro  6.3<L>  /  Alb  3.7  /  TBili  0.5  /  DBili  x   /  AST  13  /  ALT  10  /  AlkPhos  88  03-14  PT/INR - ( 14 Mar 2024 06:39 )   PT: 11.0 sec;   INR: 0.99 ratio         PTT - ( 14 Mar 2024 06:39 )  PTT:33.0 sec        03-05 Chol 119 LDL -37 - HDL 47<L> Trig 176<H>    A1C: 5.2    IMAGING: Reviewed by me.     US Duplex Carotid Arteries Complete, Bilateral (03.12.24 @ 11:49)   IMPRESSION:  Moderate calcified atheromatous plaque is present most prominently in the   regions of the bifurcations.   The luminal narrowing in the left internal carotid artery is approaching   a 50% stenosis.    CT Head No Cont (03.11.24 @ 09:41)   IMPRESSION:  Limited exam due to motion  No acute intracranial hemorrhage or acute territorial infarct.  If   symptoms persist, follow-up MRI exam recommended.     (03.11.24 @ 14:17)  IMPRESSION:  MR Head No Cont (03.11.24 @ 19:22)   Chronic left external capsule hemosiderin. Chronic bilateral   microhemorrhages.    Basilar tip aneurysm is better demonstrated on prior MRA brain.     MR Angio Head. Neck w/ IV Cont (03.04.24 @ 12:19)  1.  RIGHT CAROTID NECK CIRCULATION:   Intact.  2.  LEFT CAROTID NECK CIRCULATION:    Intact.  3.  VERTEBRAL NECK CIRCULATION:   Intact  4.  ANTERIOR INTRACRANIAL CIRCULATION:     Intact.  5.  POSTERIOR INTRACRANIAL CIRCULATION:   Complex appearance of the   basilar tip suggesting centrally thrombosed thrombosed and peripherally   patent saccular aneurysm. Note that vascular developmental duplication   and fenestration could have a similar appearance. Consider alternate   imaging modality gadolinium enhanced MR angiography or catheter   angiography    CT HEAD:  1.  No evidence of acute intracranial hemorrhage or midline shift.  2.  Chronic ischemic changes as discussed above.    CTA BRAIN:  1.  Redemonstrated aneurysmal change of the basilar tip, perhaps   minimally increased in size in the AP dimension when compared to prior   imaging.  2.  Mild blistering/fusiform aneurysmal change in the cavernous segment   of the left ICA.  3.Questionable small aneurysmal change at a left M2 bifurcation.    CTA NECK:  1.  Atherosclerotic calcification in the bilateral carotid bulbs,   resulting in severe narrowing of the proximal left ICA. Consider Doppler   ultrasound for further characterization of flow dynamics.  2.  Subcentimeter nodule in the left lung apex. Recommend follow-up   imaging as per Fleischner criteria.           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 Patient was recently discharged from [facility] on 3/5/24 after admission for chief complaint of SOB, chest pain, and confusion; patient had cardiac workup done and basilar tip aneurysm was incidental finding on CT Head, better characterized on follow-up CTA and MRA. Neuro IR evaluated patient during prior admission, with plan for patient to follow-up and see Dr. Oconnell, plan for likely outpatient for diagnostic cerebral angiogram with plan for possible intervention if indicated. Patient did not follow-up outpatient due to insurance issues and now with headache and neck pain, patient came to ED for further evaluation. Patient admitted to Stroke Neurology service for further care.    SUBJECTIVE: No events overnight.  No new neurologic complaints.  ROS reported negative unless otherwise noted.    albuterol    90 MICROgram(s) HFA Inhaler 2 Puff(s) Inhalation every 6 hours PRN  aspirin  chewable 81 milliGRAM(s) Oral daily  clopidogrel Tablet 75 milliGRAM(s) Oral daily  enoxaparin Injectable 40 milliGRAM(s) SubCutaneous every 24 hours  pantoprazole    Tablet 40 milliGRAM(s) Oral before breakfast  sodium chloride 0.9%. 1000 milliLiter(s) IV Continuous <Continuous>  tiotropium 2.5 MICROgram(s)/olodaterol 2.5 MICROgram(s) (STIOLTO) Inhaler 2 Puff(s) Inhalation two times a day      PHYSICAL EXAM:   Vital Signs Last 24 Hrs  T(C): 36.9 (14 Mar 2024 04:41), Max: 37.1 (13 Mar 2024 15:21)  T(F): 98.4 (14 Mar 2024 04:41), Max: 98.8 (13 Mar 2024 15:21)  HR: 66 (14 Mar 2024 04:41) (59 - 74)  BP: 100/57 (14 Mar 2024 04:41) (100/57 - 149/68)  BP(mean): --  RR: 18 (14 Mar 2024 04:41) (18 - 18)  SpO2: 97% (14 Mar 2024 04:41) (96% - 97%)    Parameters below as of 13 Mar 2024 19:24  Patient On (Oxygen Delivery Method): room air     General: No acute distress.   HEENT: Head normocephalic, atraumatic. Conjunctivae clear w/o exudates or drainage   Cardiac: Normal rate and rhythm. S1, S2 auscultated. No murmurs, gallops, or rubs.     Respiratory: Lung sounds clear in all fields. Chest wall symmetric, nontender.   Abdominal: Soft, nondistended, nontender. Bowel sounds normoactive x 4 quadrants.    Skin: Skin is warm   Extremities: No edema     NEUROLOGICAL EXAM:  Mental status: Awake, alert, oriented x person, place, speech fluent, follows commands, no neglect, normal memory   Cranial Nerves: No facial asymmetry, no nystagmus, no dysarthria,  tongue midline  Motor exam: Normal tone, no drift, 5/5 RUE, 5/5 RLE, 5/5 LUE, 5/5 LLE, normal fine finger movements.  Sensation: Intact to light touch   Coordination/ Gait: No dysmetria, gait not tested    LABS:                        10.2   6.24  )-----------( 213      ( 14 Mar 2024 06:39 )             30.4    03-14    136  |  98  |  12.0  ----------------------------<  130<H>  3.6   |  25.0  |  0.85    Ca    8.6      14 Mar 2024 06:39  Phos  3.6     03-14  Mg     2.1     03-14    TPro  6.3<L>  /  Alb  3.7  /  TBili  0.5  /  DBili  x   /  AST  13  /  ALT  10  /  AlkPhos  88  03-14  PT/INR - ( 14 Mar 2024 06:39 )   PT: 11.0 sec;   INR: 0.99 ratio         PTT - ( 14 Mar 2024 06:39 )  PTT:33.0 sec        03-05 Chol 119 LDL -37 - HDL 47<L> Trig 176<H>    A1C: 5.2    IMAGING: Reviewed by me.     US Duplex Carotid Arteries Complete, Bilateral (03.12.24 @ 11:49)   IMPRESSION:  Moderate calcified atheromatous plaque is present most prominently in the   regions of the bifurcations.   The luminal narrowing in the left internal carotid artery is approaching   a 50% stenosis.    CT Head No Cont (03.11.24 @ 09:41)   IMPRESSION:  Limited exam due to motion  No acute intracranial hemorrhage or acute territorial infarct.  If   symptoms persist, follow-up MRI exam recommended.     (03.11.24 @ 14:17)  IMPRESSION:  MR Head No Cont (03.11.24 @ 19:22)   Chronic left external capsule hemosiderin. Chronic bilateral   microhemorrhages.    Basilar tip aneurysm is better demonstrated on prior MRA brain.     MR Angio Head. Neck w/ IV Cont (03.04.24 @ 12:19)  1.  RIGHT CAROTID NECK CIRCULATION:   Intact.  2.  LEFT CAROTID NECK CIRCULATION:    Intact.  3.  VERTEBRAL NECK CIRCULATION:   Intact  4.  ANTERIOR INTRACRANIAL CIRCULATION:     Intact.  5.  POSTERIOR INTRACRANIAL CIRCULATION:   Complex appearance of the   basilar tip suggesting centrally thrombosed thrombosed and peripherally   patent saccular aneurysm. Note that vascular developmental duplication   and fenestration could have a similar appearance. Consider alternate   imaging modality gadolinium enhanced MR angiography or catheter   angiography    CT HEAD:  1.  No evidence of acute intracranial hemorrhage or midline shift.  2.  Chronic ischemic changes as discussed above.    CTA BRAIN:  1.  Redemonstrated aneurysmal change of the basilar tip, perhaps   minimally increased in size in the AP dimension when compared to prior   imaging.  2.  Mild blistering/fusiform aneurysmal change in the cavernous segment   of the left ICA.  3.Questionable small aneurysmal change at a left M2 bifurcation.    CTA NECK:  1.  Atherosclerotic calcification in the bilateral carotid bulbs,   resulting in severe narrowing of the proximal left ICA. Consider Doppler   ultrasound for further characterization of flow dynamics.  2.  Subcentimeter nodule in the left lung apex. Recommend follow-up   imaging as per Fleischner criteria.

## 2024-03-14 NOTE — DISCHARGE NOTE NURSING/CASE MANAGEMENT/SOCIAL WORK - NSDCPEFALRISK_GEN_ALL_CORE
For information on Fall & Injury Prevention, visit: [email or web address] OR  [email or web address] OR  https://www.cdc.gov/steadi/patient.html

## 2024-03-14 NOTE — DISCHARGE NOTE NURSING/CASE MANAGEMENT/SOCIAL WORK - PATIENT PORTAL LINK FT
You can access the FollowMyHealth Patient Portal offered by [facility] by registering at the following website: [email or web address]. By joining [employer]’s FollowMyHealth portal, you will also be able to view your health information using other applications (apps) compatible with our system.

## 2024-03-14 NOTE — PROGRESS NOTE ADULT - ASSESSMENT
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 CT head without acute infarction or hemorrhage, CT angiogram brain re- demonstrated basilar tip aneurysm which was noted as slightly increased vs. prior exam, in addition there is noted left ICA aneurysmal dilation and a questionable left MCA M2 bifurcation aneurysmal change.  CT angiogram neck showed severe bilateral atherosclerotic disease; severe in left.     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     NEURO:   -Neurologically   -Continue close monitoring for neurologic deterioration    - Stroke neuro checks q 4 hours    - SBP goal 110-150mmHg avoid rapid fluctuations and hypotension; appears to be tolerating neurologically.    -ANTITHROMBOTIC THERAPY: on home DAPT regimen; [id number]/ Clopidogrel 75mg  once daily. P2y12 229.   -titrate statin to LDL goal less than 70   - carotid duplex as noted above   -cerebral angiogram 3/14/24  -Dysphagia screen, if passed, advance as tolerated per protocol , NPO after midnight 3/13-14  -Physical therapy/OT/Speech eval/treatment.     -CARDIOVASCULAR: cardiac monitoring w/ telemetry for now, further evaluation pending findings of noted workup   , continue home cardiac regimen, close cardiology follow up                          -HEMATOLOGY: H/H with anemia, no acute change except subtle downtrend- close monitoring, Platelets 213, patient should have all age and risk appropriate malignancy screenings with PCP or sooner if clinically suspected      DVT ppx: Heparin s.c [] LMWH [x]     PULMONARY: CXR possible right lower lobe opacity, follow up cxr pa/lat without acute disease noted,  protecting airway, saturating well , outpatient follow up for incidentally noted lung nodules , resume home COPD regimen     RENAL: BUN/Cr within range, monitor urine output, maintain adequate hydration       Na Goal:  135-145    ID: afebrile, no leukocytosis, monitor for si/sx of infection     OTHER:  condition and plan of care d/w patient and family, questions and concerns addressed.     DISPOSITION: home w. recommendations per pt/ot.       CORE MEASURES:        Admission NIHSS: 1     Tenecteplase : [] YES [x] NO      LDL/HDL/A1C: 37 /5.2     Depression Screen- if depression hx and/or present      Statin Therapy: as noted      Dysphagia Screen: [x] PASS [] FAIL     Smoking [] YES [x] NO      Afib [] YES [x] NO     Stroke Education [] YES [] NO pending     Obtain screening lower extremity venous ultrasound in patients who meet 1 or more of the following criteria as patient is high risk for DVT/PE on admission:   [] History of DVT/PE  []Hypercoagulable states (Factor V Leiden, Cancer, OCP, etc. 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 CT head without acute infarction or hemorrhage, CT angiogram brain re- demonstrated basilar tip aneurysm which was noted as slightly increased vs. prior exam, in addition there is noted left ICA aneurysmal dilation and a questionable left MCA M2 bifurcation aneurysmal change.  CT angiogram neck showed severe bilateral atherosclerotic disease; severe in left.     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     NEURO:   -Neurologically without acute change   -Continue close monitoring for neurologic deterioration    - Stroke neuro checks q 4 hours    - SBP goal 110-150mmHg avoid rapid fluctuations and hypotension; appears to be tolerating neurologically.    -ANTITHROMBOTIC THERAPY: on home DAPT regimen; ASA 81mg/ Clopidogrel 75mg  once daily. P2y12 229.   -titrate statin to LDL goal less than 70   - carotid duplex as noted above   -cerebral angiogram 3/14/24  -Dysphagia screen, if passed, advance as tolerated per protocol , NPO after midnight 3/13-14  -Physical therapy/OT/Speech eval/treatment.     -CARDIOVASCULAR: cardiac monitoring w/ telemetry for now, further evaluation pending findings of noted workup   , continue home cardiac regimen, close cardiology follow up                          -HEMATOLOGY: H/H with anemia, no acute change except subtle downtrend- close monitoring, Platelets 213, patient should have all age and risk appropriate malignancy screenings with PCP or sooner if clinically suspected      DVT ppx: Heparin s.c [] LMWH [x]     PULMONARY: CXR possible right lower lobe opacity, follow up cxr pa/lat without acute disease noted,  protecting airway, saturating well , outpatient follow up for incidentally noted lung nodules , resume home COPD regimen     RENAL: BUN/Cr within range, monitor urine output, maintain adequate hydration       Na Goal:  135-145    ID: afebrile, no leukocytosis, monitor for si/sx of infection     OTHER:  condition and plan of care d/w patient and family, questions and concerns addressed.     DISPOSITION: home w. recommendations per pt/ot.       CORE MEASURES:        Admission NIHSS: 1     Tenecteplase : [] YES [x] NO      LDL/HDL/A1C: 37 /5.2     Depression Screen- if depression hx and/or present      Statin Therapy: as noted      Dysphagia Screen: [x] PASS [] FAIL     Smoking [] YES [x] NO      Afib [] YES [x] NO     Stroke Education [] YES [] NO pending     Obtain screening lower extremity venous ultrasound in patients who meet 1 or more of the following criteria as patient is high risk for DVT/PE on admission:   [] History of DVT/PE  []Hypercoagulable states (Factor V Leiden, Cancer, OCP, etc. )  []Prolonged immobility (hemiplegia/hemiparesis/post operative or any other extended immobilization)  [] Transferred from outside facility (Rehab or Long term care)  [] Age </= to 50

## 2024-03-14 NOTE — CHART NOTE - NSCHARTNOTEFT_GEN_A_CORE
Neurointerventional Surgery Post Procedure Note    Procedure: Selective Cerebral Angiography     Pre- Procedure Diagnosis: basilar tip aneurysm   Post- Procedure Diagnosis: wide based basilar tip aneurysm, no L pcomm, very small R pcomm     : Dr. Oconnell  Physician Assistant: Stormy Vazquez  Nurse: Dayna Hodge   Anesthesiologist: Dr. Miller          Radiology Tech: Manav Murillo  Fellow: Diallo Yeh     Sheath:  4 [place] Sheath    I/Os: estimated blood loss less than 20cc,  IV fluids 100cc, Urine output 0cc, Contrast: Omnipaque 240  123 cc    Vitals:  /70  HR 75  Spo2 95 %    Preliminary Report:  Under a 4 [place]uki sheath via the right groin under MAC sedation via left subclavian artery, left common carotid artery, right vertebral artery, right common artery, a selective cerebral angiography  was performed and reveals wide based basilar tip aneurysm, no L pcomm, very small R pcomm. ( Official note to follow).    Patient tolerated procedure well.  Patient remains hemodynamically stable, no change in neurological status compared to baseline.  Results were discussed with patient, patient's family and Neurosurgery.  Right groin sheath  was discontinued at 1018. Hemostasis was obtained with approximately 12 minutes of manual compression.     No active bleeding, no hematoma, no ecchymosis.   Quick clot and safeguard balloon dressing applied at 1030  Patient transferred to recovery room in stable condition.

## 2024-03-14 NOTE — DISCHARGE NOTE PROVIDER - NSDCFUADDINST_GEN_ALL_CORE_FT
Please follow up with your Neurologist in 2 weeks.   No heavy lifting or straining for 1 week   No submerging wound for 1 week  May remove dressing after shower 03/15   Do not rub wound and please patch dry

## 2024-03-14 NOTE — DISCHARGE NOTE PROVIDER - HOSPITAL COURSE
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 Patient was recently discharged from [facility] on 3/5/24 after admission for chief complaint of SOB, chest pain, and confusion; patient had cardiac workup done and basilar tip aneurysm was incidental finding on CT Head, better characterized on follow-up CTA and MRA. Neuro IR evaluated patient during prior admission, with plan for patient to follow-up and see Dr. Oconnell outpatient for diagnostic cerebral angiogram with plan for possible intervention if indicated. Patient did not follow-up outpatient due to insurance issues and now with headache and neck pain, patient came to ED for further evaluation. CTH/CTA 3/11/24 redemonstrated aneurysmal change of the basilar tip, perhaps minimally increased in size when compared to prior imaging as well as mild blistering/fusiform aneurysmal change in the cavernous segment of the left ICA, questionable small aneurysmal change at a left M2 bifurcation, and atherosclerotic calcification in the bilateral carotid bulbs, resulting in severe narrowing of the proximal left ICA.    Patient now with symptoms - left sided headache and neck pain, in conjunction with imaging showing possible increase in basilar tip aneurysm size, as well as possible aneurysmal change in the Left ICA. Patient admitted for monitoring of neurological symptoms, diagnostic cerebral angiogram and possible intervention, and further workup and care.     Patient underwent a diagnostic catheter angiogram which revealed *********** To be Cont. **********************      CT Head No Cont (03.11.24 @ 09:41)   IMPRESSION:  Limited exam due to motion  No acute intracranial hemorrhage or acute territorial infarct.  If   symptoms persist, follow-up MRI exam recommended.       (03.11.24 @ 14:17)  IMPRESSION:  MR Head No Cont (03.11.24 @ 19:22)   Chronic left external capsule hemosiderin. Chronic bilateral   microhemorrhages.    Basilar tip aneurysm is better demonstrated on prior MRA brain.     MR Angio Head. Neck w/ IV Cont (03.04.24 @ 12:19)  1.  RIGHT CAROTID NECK CIRCULATION:   Intact.  2.  LEFT CAROTID NECK CIRCULATION:    Intact.  3.  VERTEBRAL NECK CIRCULATION:   Intact  4.  ANTERIOR INTRACRANIAL CIRCULATION:     Intact.  5.  POSTERIOR INTRACRANIAL CIRCULATION:   Complex appearance of the   basilar tip suggesting centrally thrombosed thrombosed and peripherally   patent saccular aneurysm. Note that vascular developmental duplication   and fenestration could have a similar appearance. Consider alternate   imaging modality gadolinium enhanced MR angiography or catheter   angiography    CT HEAD:  1.  No evidence of acute intracranial hemorrhage or midline shift.  2.  Chronic ischemic changes as discussed above.    CTA BRAIN:  1.  Redemonstrated aneurysmal change of the basilar tip, perhaps   minimally increased in size in the AP dimension when compared to prior   imaging.  2.  Mild blistering/fusiform aneurysmal change in the cavernous segment   of the left ICA.  3.Questionable small aneurysmal change at a left M2 bifurcation.    CTA NECK:  1.  Atherosclerotic calcification in the bilateral carotid bulbs,   resulting in severe narrowing of the proximal left ICA. Consider Doppler   ultrasound for further characterization of flow dynamics.  2.  Subcentimeter nodule in the left lung apex. Recommend follow-up   imaging as per Fleischner criteria.     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 Patient was recently discharged from [facility] on 3/5/24 after admission for chief complaint of SOB, chest pain, and confusion; patient had cardiac workup done and basilar tip aneurysm was incidental finding on CT Head, better characterized on follow-up CTA and MRA. Neuro IR evaluated patient during prior admission, with plan for patient to follow-up and see Dr. Oconnell outpatient for diagnostic cerebral angiogram with plan for possible intervention if indicated. Patient did not follow-up outpatient due to insurance issues and now with headache and neck pain, patient came to ED for further evaluation. CTH/CTA 3/11/24 redemonstrated aneurysmal change of the basilar tip, perhaps minimally increased in size when compared to prior imaging as well as mild blistering/fusiform aneurysmal change in the cavernous segment of the left ICA, questionable small aneurysmal change at a left M2 bifurcation, and atherosclerotic calcification in the bilateral carotid bulbs, resulting in severe narrowing of the proximal left ICA.    Patient now with symptoms - left sided headache and neck pain, in conjunction with imaging showing possible increase in basilar tip aneurysm size, as well as possible aneurysmal change in the Left ICA. Patient admitted for monitoring of neurological symptoms, diagnostic cerebral angiogram and possible intervention, and further workup and care.     Patient underwent a diagnostic catheter angiogram with an outpatient followup with Dr. Oconnell in 2 weeks.     CT Head No Cont (03.11.24 @ 09:41)   IMPRESSION:  Limited exam due to motion  No acute intracranial hemorrhage or acute territorial infarct.  If   symptoms persist, follow-up MRI exam recommended.       (03.11.24 @ 14:17)  IMPRESSION:  MR Head No Cont (03.11.24 @ 19:22)   Chronic left external capsule hemosiderin. Chronic bilateral   microhemorrhages.    Basilar tip aneurysm is better demonstrated on prior MRA brain.     MR Angio Head. Neck w/ IV Cont (03.04.24 @ 12:19)  1.  RIGHT CAROTID NECK CIRCULATION:   Intact.  2.  LEFT CAROTID NECK CIRCULATION:    Intact.  3.  VERTEBRAL NECK CIRCULATION:   Intact  4.  ANTERIOR INTRACRANIAL CIRCULATION:     Intact.  5.  POSTERIOR INTRACRANIAL CIRCULATION:   Complex appearance of the   basilar tip suggesting centrally thrombosed thrombosed and peripherally   patent saccular aneurysm. Note that vascular developmental duplication   and fenestration could have a similar appearance. Consider alternate   imaging modality gadolinium enhanced MR angiography or catheter   angiography    CT HEAD:  1.  No evidence of acute intracranial hemorrhage or midline shift.  2.  Chronic ischemic changes as discussed above.    CTA BRAIN:  1.  Redemonstrated aneurysmal change of the basilar tip, perhaps   minimally increased in size in the AP dimension when compared to prior   imaging.  2.  Mild blistering/fusiform aneurysmal change in the cavernous segment   of the left ICA.  3.Questionable small aneurysmal change at a left M2 bifurcation.    CTA NECK:  1.  Atherosclerotic calcification in the bilateral carotid bulbs,   resulting in severe narrowing of the proximal left ICA. Consider Doppler   ultrasound for further characterization of flow dynamics.  2.  Subcentimeter nodule in the left lung apex. Recommend follow-up   imaging as per Fleischner criteria.

## 2024-03-14 NOTE — DISCHARGE NOTE PROVIDER - PROVIDER TOKENS
PROVIDER:[TOKEN:[[id number]:[facility]:[id number]],FOLLOWUP:[1 month],ESTABLISHEDPATIENT:[T]] PROVIDER:[TOKEN:[[id number]:[facility]:[id number]],FOLLOWUP:[2 weeks],ESTABLISHEDPATIENT:[T]],FREE:[LAST:[Primary],FIRST:[provider],PHONE:[(   )    -],FAX:[(   )    -],FOLLOWUP:[1 week]]

## 2024-03-14 NOTE — PROGRESS NOTE ADULT - REASON FOR ADMISSION
headache, dizziness, known basilar tip aneurysm

## 2024-03-14 NOTE — CHART NOTE - NSCHARTNOTESELECT_GEN_ALL_CORE
Neurointerventional Surgery Post Procedure Note/Event Note
Neurointerventional Surgery Pre Procedure Note/Event Note
Neurointerventional Surgery Pre-Procedure Note/Event Note

## 2024-03-14 NOTE — CHART NOTE - NSCHARTNOTEFT_GEN_A_CORE
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 Patient was recently discharged from [facility] on 3/5/24 after admission for chief complaint of SOB, chest pain, and confusion; patient had cardiac workup done and basilar tip aneurysm was incidental finding on CT Head, better characterized on follow-up CTA and MRA. Neuro IR evaluated patient during prior admission, with plan for patient to follow-up and see Dr. Oconnell, plan for likely outpatient for diagnostic cerebral angiogram with plan for possible intervention if indicated. Patient did not follow-up outpatient due to insurance issues and now with headache and neck pain, patient came to ED for further evaluation. Patient admitted to Stroke Neurology service for further care. (11 Mar 2024 15:43)    Patient presents today for cerebral angiogram to further evaluate basilar tip aneurysm.       Allergies: OHS PT (Unknown)  No Known Allergies      PAST MEDICAL & SURGICAL HISTORY:  HTN (hypertension)  Coronary artery disease involving native coronary artery of native heart, angina presence unspecified  NSTEMI (non-ST elevated myocardial infarction)  S/P CABG x 4  Smoker, quit on 7/6 admission  HLD (hyperlipidemia)  History of COPD  S/P CABG x 4      Social History:  Prior smoker, states she started smoking cigarettes ~1 pack per day around age 17 and stopped in July 2018 (~55 pack years)  Denies ETOH use, denies drug use  Patient originally from [place], lives with family here. (11 Mar 2024 15:43)      FAMILY HISTORY:  No pertinent family history in first degree relatives      Current Medications:   albuterol    90 MICROgram(s) HFA Inhaler 2 Puff(s) Inhalation every 6 hours PRN  aspirin  chewable 81 milliGRAM(s) Oral daily  clopidogrel Tablet 75 milliGRAM(s) Oral daily  enoxaparin Injectable 40 milliGRAM(s) SubCutaneous every 24 hours  pantoprazole    Tablet 40 milliGRAM(s) Oral before breakfast  sodium chloride 0.9%. 1000 milliLiter(s) IV Continuous <Continuous>  tiotropium 2.5 MICROgram(s)/olodaterol 2.5 MICROgram(s) (STIOLTO) Inhaler 2 Puff(s) Inhalation two times a day      Physical Exam:  Constitutional: NAD, lying in bed  Neuro  * Mental Status:  GCS 15: Awake, alert, oriented to conversation. No aphasia or difficulty speaking. No dysarthria. Able to name objects and their function.  * Cranial Nerves: Cnii-Cnxii grossly intact. PERRL, EOMI, tongue midline, no gaze deviation  * Motor: RUE 5/5, LUE 5/5, RLE 5/5, LLE 5/5, no drift or dysmetria  * Sensory: Sensation intact to light touch  * Reflexes: not assessed   Cardiovascular: Regular rate and rhythm.  Eyes: See neurologic examination with detailed examination of eyes.  ENT: No JVD, Trachea Midline  Respiratory: non labored breathing   Gastrointestinal: Soft, nontender, nondistended.  Genitourinary: [ ] Garcia, [ x ] No Garcia.   Musculoskeletal: No muscle wasting noted, (See neurologic assessment for full muscle strength assessment)  Skin:  no wounds, no redness, no abrasions noted  Hematologic / Lymph / Immunologic: No bleeding from IV sites or wounds      NIH SS:  DATE: 3/14/24   TIME:  1A: Level of consciousness (0-3): 0  1B: Questions (0-2): 0    1C: Commands (0-2): 0  2: Gaze (0-2): 0  3: Visual fields (0-3): 0  4: Facial palsy (0-3): 0  MOTOR:  5A: Left arm motor drift (0-4): 0  5B: Right arm motor drift (0-4): 0  6A: Left leg motor drift (0-4): 0  6B: Right leg motor drift (0-4): 0  7: Limb ataxia (0-2): 0  SENSORY:  8: Sensation (0-2): 0  SPEECH:  9: Language (0-3): 0  10: Dysarthria (0-2): 0  EXTINCTION:  11: Extinction/inattention (0-2): 0    TOTAL SCORE:       Labs:                         11.0   7.43  )-----------( 226      ( 13 Mar 2024 05:36 )             33.1       03-13    141  |  99  |  12.5  ----------------------------<  116<H>  4.0   |  29.0  |  0.76    Ca    9.0      13 Mar 2024 05:36    TPro  6.6  /  Alb  3.9  /  TBili  0.5  /  DBili  x   /  AST  14  /  ALT  10  /  AlkPhos  92  03-13    PT/INR - ( 13 Mar 2024 05:36 )   PT: 11.0 sec;   INR: 0.99 ratio    PTT - ( 13 Mar 2024 05:36 )  PTT:35.8 sec      Assessment/Plan:   This is a 78y  year old Female  presents with basilar tip aneurysm. Patient presents to neuro-IR for selective cerebral angiography.     Procedure, goals, risks, benefits and alternatives  were discussed with patient using ipad .  All questions were answered.  Risks include but are not limited to stroke, vessel injury, hemorrhage, and or groin hematoma.  Patient demonstrates understanding  of all risks involved with this procedure and wishes to continue.   Appropriate  consent was obtained from patient and consent is in the patient's chart. 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 Patient was recently discharged from [facility] on 3/5/24 after admission for chief complaint of SOB, chest pain, and confusion; patient had cardiac workup done and basilar tip aneurysm was incidental finding on CT Head, better characterized on follow-up CTA and MRA. Neuro IR evaluated patient during prior admission, with plan for patient to follow-up and see Dr. Oconnell, plan for likely outpatient for diagnostic cerebral angiogram with plan for possible intervention if indicated. Patient did not follow-up outpatient due to insurance issues and now with headache and neck pain, patient came to ED for further evaluation. Patient admitted to Stroke Neurology service for further care. (11 Mar 2024 15:43)    Patient presents today for cerebral angiogram to further evaluate basilar tip aneurysm.       Allergies: OHS PT (Unknown)  No Known Allergies      PAST MEDICAL & SURGICAL HISTORY:  HTN (hypertension)  Coronary artery disease involving native coronary artery of native heart, angina presence unspecified  NSTEMI (non-ST elevated myocardial infarction)  S/P CABG x 4  Smoker, quit on 7/6 admission  HLD (hyperlipidemia)  History of COPD  S/P CABG x 4      Social History:  Prior smoker, states she started smoking cigarettes ~1 pack per day around age 17 and stopped in July 2018 (~55 pack years)  Denies ETOH use, denies drug use  Patient originally from [place], lives with family here. (11 Mar 2024 15:43)      FAMILY HISTORY:  No pertinent family history in first degree relatives      Current Medications:   albuterol    90 MICROgram(s) HFA Inhaler 2 Puff(s) Inhalation every 6 hours PRN  aspirin  chewable 81 milliGRAM(s) Oral daily  clopidogrel Tablet 75 milliGRAM(s) Oral daily  enoxaparin Injectable 40 milliGRAM(s) SubCutaneous every 24 hours  pantoprazole    Tablet 40 milliGRAM(s) Oral before breakfast  sodium chloride 0.9%. 1000 milliLiter(s) IV Continuous <Continuous>  tiotropium 2.5 MICROgram(s)/olodaterol 2.5 MICROgram(s) (STIOLTO) Inhaler 2 Puff(s) Inhalation two times a day      Physical Exam:  Constitutional: NAD, lying in bed  Neuro  * Mental Status:  GCS 15: Awake, alert, oriented to conversation. No aphasia or difficulty speaking. No dysarthria. Able to name objects and their function.  * Cranial Nerves: Cnii-Cnxii grossly intact. PERRL, EOMI, tongue midline, no gaze deviation  * Motor: RUE 5/5, LUE 5/5, RLE 5/5, LLE 5/5, no drift or dysmetria  * Sensory: Sensation intact to light touch  * Reflexes: not assessed   Cardiovascular: Regular rate and rhythm.  Eyes: See neurologic examination with detailed examination of eyes.  ENT: No JVD, Trachea Midline  Respiratory: non labored breathing   Gastrointestinal: Soft, nontender, nondistended.  Genitourinary: [ ] Garcia, [ x ] No Garcia.   Musculoskeletal: No muscle wasting noted, (See neurologic assessment for full muscle strength assessment)  Skin:  no wounds, no redness, no abrasions noted  Hematologic / Lymph / Immunologic: No bleeding from IV sites or wounds      NIH SS:  DATE: 3/14/24   TIME: 0720  1A: Level of consciousness (0-3): 0  1B: Questions (0-2): 0    1C: Commands (0-2): 0  2: Gaze (0-2): 0  3: Visual fields (0-3): 0  4: Facial palsy (0-3): 0  MOTOR:  5A: Left arm motor drift (0-4): 0  5B: Right arm motor drift (0-4): 0  6A: Left leg motor drift (0-4): 0  6B: Right leg motor drift (0-4): 0  7: Limb ataxia (0-2): 0  SENSORY:  8: Sensation (0-2): 0  SPEECH:  9: Language (0-3): 0  10: Dysarthria (0-2): 0  EXTINCTION:  11: Extinction/inattention (0-2): 0    TOTAL SCORE: 0      Labs:                             10.2   6.24  )-----------( 213      ( 14 Mar 2024 06:39 )             30.4     03-14    136  |  98  |  12.0  ----------------------------<  130<H>  3.6   |  25.0  |  0.85    Ca    8.6      14 Mar 2024 06:39  Phos  3.6     03-14  Mg     2.1     03-14    TPro  6.3<L>  /  Alb  3.7  /  TBili  0.5  /  DBili  x   /  AST  13  /  ALT  10  /  AlkPhos  88  03-14    3/14/24:       Assessment/Plan:   This is a 78y  year old Female  presents with basilar tip aneurysm. Patient presents to neuro-IR for selective cerebral angiography.     Procedure, goals, risks, benefits and alternatives  were discussed with patient using ipad .  All questions were answered.  Risks include but are not limited to stroke, vessel injury, hemorrhage, and or groin hematoma.  Patient demonstrates understanding  of all risks involved with this procedure and wishes to continue.   Appropriate  consent was obtained from patient and consent is in the patient's chart.

## 2024-03-14 NOTE — PROGRESS NOTE ADULT - NS ATTEND AMEND GEN_ALL_CORE FT
Agree with PA's assessment and plan.
Agree with PA's assessment and plan
Agree with PA's assessment and plan.

## 2024-03-14 NOTE — DISCHARGE NOTE PROVIDER - CARE PROVIDER_API CALL
Arley Oconnell  Neurology  [address] [place] [id number]  Phone: [phone]  Fax: [phone]  Established Patient  Follow Up Time: 1 month   Arley Oconnell  Neurology  [address] [place] [id number]  Phone: [phone]  Fax: [phone]  Established Patient  Follow Up Time: 2 weeks    Primary, provider  Phone: (   )    -  Fax: (   )    -  Follow Up Time: 1 week

## 2024-03-14 NOTE — DISCHARGE NOTE PROVIDER - NSDCMRMEDTOKEN_GEN_ALL_CORE_FT
Albuterol (Eqv-ProAir HFA) 90 mcg/inh inhalation aerosol: 2 puff(s) inhaled 4 times a day as needed for  bronchospasm  amLODIPine 10 mg oral tablet: 1 tab(s) orally once a day  aspirin 81 mg oral tablet: 1 day(s) orally once a day  Crestor 40 mg oral tablet: 1 tab(s) orally once a day  hydroCHLOROthiazide 25 mg oral tablet: 1 tab(s) orally once a day  omeprazole 20 mg oral delayed release tablet: 1 tab(s) orally once a day  Plavix 75 mg oral tablet: 1 tab(s) orally once a day  Stiolto Respimat 10 ACT 2.5 mcg-2.5 mcg/inh inhalation aerosol: 2 puff(s) inhaled 2 times a day  valsartan 320 mg oral tablet: 1 tab(s) orally once a day   albuterol 90 mcg/inh inhalation aerosol: 2 puff(s) inhaled every 6 hours As needed for bronchospasm  amLODIPine 10 mg oral tablet: 1 tab(s) orally once a day  aspirin 81 mg oral tablet: 1 day(s) orally once a day  Crestor 40 mg oral tablet: 1 tab(s) orally once a day  hydroCHLOROthiazide 25 mg oral tablet: 1 tab(s) orally once a day  omeprazole 20 mg oral delayed release tablet: 1 tab(s) orally once a day  Plavix 75 mg oral tablet: 1 tab(s) orally once a day  tiotropium-olodaterol 2.5 mcg-2.5 mcg/inh inhalation aerosol: 2.5 microgram(s) inhaled 2 times a day as needed for  bronchospasm  valsartan 320 mg oral tablet: 1 tab(s) orally once a day

## 2024-03-19 PROBLEM — Z87.09 PERSONAL HISTORY OF OTHER DISEASES OF THE RESPIRATORY SYSTEM: Chronic | Status: ACTIVE | Noted: 2024-03-11

## 2024-03-19 PROBLEM — E78.5 HYPERLIPIDEMIA, UNSPECIFIED: Chronic | Status: ACTIVE | Noted: 2024-03-11

## 2024-04-03 ENCOUNTER — APPOINTMENT (OUTPATIENT)
Dept: NEUROLOGY | Facility: CLINIC | Age: 79
End: 2024-04-03

## 2024-08-06 NOTE — ED ADULT NURSE NOTE - TEMPLATE LIST FOR HEAD TO TOE ASSESSMENT
Called patient regarding referral to our [facility]. Patient did not answer so I left a vmail with our contact information.    Cardiac

## 2024-08-22 PROCEDURE — C1769: CPT

## 2024-08-22 PROCEDURE — 86900 BLOOD TYPING SEROLOGIC ABO: CPT

## 2024-08-22 PROCEDURE — 93005 ELECTROCARDIOGRAM TRACING: CPT

## 2024-08-22 PROCEDURE — 97163 PT EVAL HIGH COMPLEX 45 MIN: CPT

## 2024-08-22 PROCEDURE — 85027 COMPLETE CBC AUTOMATED: CPT

## 2024-08-22 PROCEDURE — 36225 PLACE CATH SUBCLAVIAN ART: CPT

## 2024-08-22 PROCEDURE — 93880 EXTRACRANIAL BILAT STUDY: CPT

## 2024-08-22 PROCEDURE — 83735 ASSAY OF MAGNESIUM: CPT

## 2024-08-22 PROCEDURE — 70450 CT HEAD/BRAIN W/O DYE: CPT | Mod: MC

## 2024-08-22 PROCEDURE — 85610 PROTHROMBIN TIME: CPT

## 2024-08-22 PROCEDURE — 36223 PLACE CATH CAROTID/INOM ART: CPT

## 2024-08-22 PROCEDURE — 84100 ASSAY OF PHOSPHORUS: CPT

## 2024-08-22 PROCEDURE — 85025 COMPLETE CBC W/AUTO DIFF WBC: CPT

## 2024-08-22 PROCEDURE — 70496 CT ANGIOGRAPHY HEAD: CPT | Mod: MC

## 2024-08-22 PROCEDURE — 85576 BLOOD PLATELET AGGREGATION: CPT

## 2024-08-22 PROCEDURE — C1894: CPT

## 2024-08-22 PROCEDURE — T1013: CPT

## 2024-08-22 PROCEDURE — 86901 BLOOD TYPING SEROLOGIC RH(D): CPT

## 2024-08-22 PROCEDURE — 86850 RBC ANTIBODY SCREEN: CPT

## 2024-08-22 PROCEDURE — 94640 AIRWAY INHALATION TREATMENT: CPT

## 2024-08-22 PROCEDURE — 36415 COLL VENOUS BLD VENIPUNCTURE: CPT

## 2024-08-22 PROCEDURE — C1887: CPT

## 2024-08-22 PROCEDURE — 71045 X-RAY EXAM CHEST 1 VIEW: CPT

## 2024-08-22 PROCEDURE — 85730 THROMBOPLASTIN TIME PARTIAL: CPT

## 2024-08-22 PROCEDURE — 70498 CT ANGIOGRAPHY NECK: CPT | Mod: MC

## 2024-08-22 PROCEDURE — 97167 OT EVAL HIGH COMPLEX 60 MIN: CPT

## 2024-08-22 PROCEDURE — 71046 X-RAY EXAM CHEST 2 VIEWS: CPT

## 2024-08-22 PROCEDURE — 80053 COMPREHEN METABOLIC PANEL: CPT

## 2024-08-22 PROCEDURE — 96374 THER/PROPH/DIAG INJ IV PUSH: CPT

## 2024-08-22 PROCEDURE — 99285 EMERGENCY DEPT VISIT HI MDM: CPT

## 2024-08-22 PROCEDURE — 70551 MRI BRAIN STEM W/O DYE: CPT | Mod: MC

## 2024-10-11 NOTE — ED PROVIDER NOTE - CARDIAC, MLM
hospital    Discharge home    Medical Decision Making: Ty Young DO  10/11/2024  10:06 AM    Bradycardic rate, regular rhythm.  Heart sounds S1, S2.  No murmurs, rubs or gallops.

## 2025-02-25 NOTE — PROGRESS NOTE ADULT - ASSESSMENT
Tiny Couch is a 73 year old female with significant PMH of HTN, CAD, and active smoker (since age 17, ~ 1  per 3 days), initially presented to [facility] on 7/6/18 with unstable angina, ruled in NSTEMI, LHC showed TVD, at which time CT surgery was consulted. Preop, beta blocker was initially withheld due to bradycardia and she had chest pain found to be anxiety ridden with negative work up. 7/11 s/p CABG x 4 (LIMA-LAD, SVG-OM, sequential SVG- RCA/PDA, with left saphenous EVH.  7/11- Extubated with PaO2 60s-70s, given history of active smoking hypoxic respiratory issues expected, treated with duonebs and solucortef given wheezing, hypoxemic (SpO2 sustaining at 89-90%) so HF NC was started, Cardene transitioned to NTG gtt given possibility of shunting contributing to hypoxemia, autodiuresing  7/12- Solucortef completed, seen by Pulmonary Symbicort added DISPLAY PLAN FREE TEXT